# Patient Record
Sex: FEMALE | Race: WHITE | NOT HISPANIC OR LATINO | Employment: FULL TIME | URBAN - NONMETROPOLITAN AREA
[De-identification: names, ages, dates, MRNs, and addresses within clinical notes are randomized per-mention and may not be internally consistent; named-entity substitution may affect disease eponyms.]

---

## 2017-07-24 ENCOUNTER — OFFICE VISIT (OUTPATIENT)
Dept: SURGERY | Facility: CLINIC | Age: 42
End: 2017-07-24

## 2017-07-24 VITALS
BODY MASS INDEX: 31.66 KG/M2 | HEART RATE: 80 BPM | TEMPERATURE: 98.1 F | OXYGEN SATURATION: 98 % | WEIGHT: 197 LBS | HEIGHT: 66 IN | SYSTOLIC BLOOD PRESSURE: 120 MMHG | DIASTOLIC BLOOD PRESSURE: 80 MMHG

## 2017-07-24 DIAGNOSIS — R10.11 RIGHT UPPER QUADRANT ABDOMINAL PAIN: ICD-10-CM

## 2017-07-24 DIAGNOSIS — D50.0 IRON DEFICIENCY ANEMIA DUE TO CHRONIC BLOOD LOSS: ICD-10-CM

## 2017-07-24 DIAGNOSIS — K21.00 GASTROESOPHAGEAL REFLUX DISEASE WITH ESOPHAGITIS: Primary | ICD-10-CM

## 2017-07-24 PROCEDURE — 99204 OFFICE O/P NEW MOD 45 MIN: CPT | Performed by: SURGERY

## 2017-07-24 RX ORDER — DIAZEPAM 5 MG/1
5 TABLET ORAL DAILY PRN
COMMUNITY
End: 2018-05-09

## 2017-07-24 RX ORDER — ARIPIPRAZOLE 10 MG/1
10 TABLET ORAL DAILY
COMMUNITY
End: 2018-05-09

## 2017-07-24 NOTE — PROGRESS NOTES
Patient: Mary MENEZES    YOB: 1975    Date: 07/24/2017    Primary Care Provider: Sukumar Medley MD    Reason for Consultation: Colonoscopy    Chief complaint:   Chief Complaint   Patient presents with   • Anemia       Subjective .     History of present illness:  I saw the patient in the office today as a consultation for evaluation and treatment of colonoscopy and EGD. Patient c/o dizziness, weakness, and fatigue per 1 week. She was admitted to [facility] on 7/21/17 per anemia. Previous colonoscopy done 5 years ago per chronic constipation. Positive family hx of colon cancer. Patient's grandfather had colon cancer, patient had previous problems with bleeding hemorrhoid disease are currently sees no visible blood.  Hemoglobin was down to 5.3, she was given 2 units of blood last week.  Patient feels tired, has nausea abdominal bloating and recent weight gain with fatty food intolerance.  No previous gallbladder workup.  No previous upper endoscopy.  Her last colonoscopy indicated a few polyps.  Patient does not have regular menstrual periods, had a hysterectomy in the past.               Review of Systems   Constitutional: Positive for fatigue. Negative for chills, fever and unexpected weight change.   HENT: Negative for hearing loss, trouble swallowing and voice change.    Eyes: Negative for visual disturbance.   Respiratory: Positive for shortness of breath. Negative for apnea, cough, chest tightness and wheezing.    Cardiovascular: Negative for chest pain, palpitations and leg swelling.   Gastrointestinal: Positive for abdominal pain. Negative for abdominal distention, anal bleeding, blood in stool, constipation, diarrhea, nausea, rectal pain and vomiting.   Endocrine: Negative for cold intolerance and heat intolerance.   Genitourinary: Negative for difficulty urinating, dysuria and flank pain.   Musculoskeletal: Negative for back pain and gait problem.   Skin: Negative for color change, rash and  wound.   Neurological: Positive for dizziness, weakness and light-headedness. Negative for syncope, speech difficulty, numbness and headaches.   Hematological: Negative for adenopathy. Does not bruise/bleed easily.   Psychiatric/Behavioral: Negative for confusion. The patient is not nervous/anxious.            History:  Past Medical History:   Diagnosis Date   • Depression with anxiety        Past Surgical History:   Procedure Laterality Date   • TUBAL ABDOMINAL LIGATION         Family History   Problem Relation Age of Onset   • Diabetes Mother    • Diabetes Father    • Diabetes Maternal Grandmother    • Heart disease Maternal Grandmother    • Heart disease Paternal Grandmother    • Colon cancer Maternal Grandfather        Social History   Substance Use Topics   • Smoking status: Current Every Day Smoker   • Smokeless tobacco: None   • Alcohol use Yes       Allergies:  Allergies no known allergies    Medications:    Current Outpatient Prescriptions:   •  ARIPiprazole (ABILIFY) 10 MG tablet, Take 10 mg by mouth Daily., Disp: , Rfl:   •  diazePAM (VALIUM) 5 MG tablet, Take 5 mg by mouth As Needed for Anxiety., Disp: , Rfl:     Objective     Vital Signs:   Temp:  [98.1 °F (36.7 °C)] 98.1 °F (36.7 °C)  Heart Rate:  [80] 80  BP: (120)/(80) 120/80    Physical Exam:   General Appearance:    Alert, cooperative, in no acute distress   Head:    Normocephalic, without obvious abnormality, atraumatic   Eyes:            Lids and lashes normal, conjunctivae and sclerae normal, no   icterus, no pallor, corneas clear, PERRL   Ears:    Ears appear intact with no abnormalities noted   Throat:   No oral lesions, no thrush, oral mucosa moist   Neck:   No adenopathy, supple, trachea midline, no thyromegaly,  no JVD   Lungs:     Clear to auscultation,respirations regular, even and                  unlabored    Heart:    Regular rhythm and normal rate, normal S1 and S2, no            murmur   Abdomen:     no masses, no organomegaly, soft  non-tender, non-distended, no guarding, there is  evidence of Right upper quadrant tenderness   Extremities:   Moves all extremities well, no edema, no cyanosis, no             redness   Pulses:   Pulses palpable and equal bilaterally   Skin:   No bleeding, bruising or rash   Lymph nodes:   No palpable adenopathy   Neurologic:   Cranial nerves 2 - 12 grossly intact, sensation intact      Results Review:   I reviewed the patient's new clinical results.    Assessment/Plan :    1. Gastroesophageal reflux disease with esophagitis    2. Right upper quadrant abdominal pain    3. Iron deficiency anemia due to chronic blood loss        I recommend a colonoscopy And EGD for further evaluation. The procedure was explained as well as the risks which include but are not limited to bleeding, infection, perforation, abdominal pain etc. The patient understands these risks and the procedure and wishes to proceed.  We will also plan on gallbladder ultrasound today and schedule evaluation by hematology for chronic anemia.  Ultrasound today indicates a thickened gallbladder wall 1 cm and sludge in the gallbladder indicating chronic cholecystitis.  If endoscopy negative, patient will need laparoscopic cholecystectomy especially since she has a very strong family history of gallbladder cancer as well    Electronically signed by Albaro Lopez MD  07/24/17 2:20 PM

## 2017-07-26 ENCOUNTER — PREP FOR SURGERY (OUTPATIENT)
Dept: OTHER | Facility: HOSPITAL | Age: 42
End: 2017-07-26

## 2017-07-26 DIAGNOSIS — D50.0 IRON DEFICIENCY ANEMIA DUE TO CHRONIC BLOOD LOSS: Primary | ICD-10-CM

## 2017-07-28 ENCOUNTER — ANESTHESIA EVENT (OUTPATIENT)
Dept: GASTROENTEROLOGY | Facility: HOSPITAL | Age: 42
End: 2017-07-28

## 2017-07-28 ENCOUNTER — HOSPITAL ENCOUNTER (OUTPATIENT)
Facility: HOSPITAL | Age: 42
Setting detail: HOSPITAL OUTPATIENT SURGERY
Discharge: HOME | End: 2017-07-28
Attending: SURGERY | Admitting: SURGERY

## 2017-07-28 ENCOUNTER — ANESTHESIA (OUTPATIENT)
Dept: GASTROENTEROLOGY | Facility: HOSPITAL | Age: 42
End: 2017-07-28

## 2017-07-28 VITALS
DIASTOLIC BLOOD PRESSURE: 81 MMHG | HEART RATE: 65 BPM | OXYGEN SATURATION: 99 % | WEIGHT: 197 LBS | HEIGHT: 66 IN | SYSTOLIC BLOOD PRESSURE: 138 MMHG | RESPIRATION RATE: 16 BRPM | TEMPERATURE: 97.6 F | BODY MASS INDEX: 31.66 KG/M2

## 2017-07-28 DIAGNOSIS — D50.0 IRON DEFICIENCY ANEMIA DUE TO CHRONIC BLOOD LOSS: ICD-10-CM

## 2017-07-28 LAB — B-HCG UR QL: NEGATIVE

## 2017-07-28 PROCEDURE — 25010000002 PROPOFOL 10 MG/ML EMULSION: Performed by: NURSE ANESTHETIST, CERTIFIED REGISTERED

## 2017-07-28 PROCEDURE — 25010000002 FENTANYL CITRATE (PF) 100 MCG/2ML SOLUTION: Performed by: NURSE ANESTHETIST, CERTIFIED REGISTERED

## 2017-07-28 PROCEDURE — 25010000002 MIDAZOLAM PER 1 MG: Performed by: NURSE ANESTHETIST, CERTIFIED REGISTERED

## 2017-07-28 PROCEDURE — 81025 URINE PREGNANCY TEST: CPT | Performed by: SURGERY

## 2017-07-28 RX ORDER — MIDAZOLAM HYDROCHLORIDE 1 MG/ML
INJECTION INTRAMUSCULAR; INTRAVENOUS AS NEEDED
Status: DISCONTINUED | OUTPATIENT
Start: 2017-07-28 | End: 2017-07-28 | Stop reason: SURG

## 2017-07-28 RX ORDER — PROPOFOL 10 MG/ML
VIAL (ML) INTRAVENOUS AS NEEDED
Status: DISCONTINUED | OUTPATIENT
Start: 2017-07-28 | End: 2017-07-28 | Stop reason: SURG

## 2017-07-28 RX ORDER — MIDAZOLAM HYDROCHLORIDE 1 MG/ML
2 INJECTION INTRAMUSCULAR; INTRAVENOUS ONCE
Status: DISCONTINUED | OUTPATIENT
Start: 2017-07-28 | End: 2017-07-28

## 2017-07-28 RX ORDER — LIDOCAINE HYDROCHLORIDE 20 MG/ML
INJECTION, SOLUTION INFILTRATION; PERINEURAL AS NEEDED
Status: DISCONTINUED | OUTPATIENT
Start: 2017-07-28 | End: 2017-07-28 | Stop reason: SURG

## 2017-07-28 RX ORDER — SODIUM CHLORIDE 0.9 % (FLUSH) 0.9 %
3 SYRINGE (ML) INJECTION AS NEEDED
Status: DISCONTINUED | OUTPATIENT
Start: 2017-07-28 | End: 2017-07-28 | Stop reason: HOSPADM

## 2017-07-28 RX ORDER — ONDANSETRON 2 MG/ML
4 INJECTION INTRAMUSCULAR; INTRAVENOUS ONCE AS NEEDED
Status: DISCONTINUED | OUTPATIENT
Start: 2017-07-28 | End: 2017-07-28 | Stop reason: HOSPADM

## 2017-07-28 RX ORDER — FENTANYL CITRATE 50 UG/ML
INJECTION, SOLUTION INTRAMUSCULAR; INTRAVENOUS AS NEEDED
Status: DISCONTINUED | OUTPATIENT
Start: 2017-07-28 | End: 2017-07-28 | Stop reason: SURG

## 2017-07-28 RX ORDER — SODIUM CHLORIDE, SODIUM LACTATE, POTASSIUM CHLORIDE, CALCIUM CHLORIDE 600; 310; 30; 20 MG/100ML; MG/100ML; MG/100ML; MG/100ML
1000 INJECTION, SOLUTION INTRAVENOUS CONTINUOUS PRN
Status: DISCONTINUED | OUTPATIENT
Start: 2017-07-28 | End: 2017-07-28 | Stop reason: HOSPADM

## 2017-07-28 RX ADMIN — PROPOFOL 50 MG: 10 INJECTION, EMULSION INTRAVENOUS at 11:44

## 2017-07-28 RX ADMIN — PROPOFOL 50 MG: 10 INJECTION, EMULSION INTRAVENOUS at 11:35

## 2017-07-28 RX ADMIN — PROPOFOL 50 MG: 10 INJECTION, EMULSION INTRAVENOUS at 11:34

## 2017-07-28 RX ADMIN — MIDAZOLAM HYDROCHLORIDE 1 MG: 1 INJECTION, SOLUTION INTRAMUSCULAR; INTRAVENOUS at 11:31

## 2017-07-28 RX ADMIN — PROPOFOL 30 MG: 10 INJECTION, EMULSION INTRAVENOUS at 11:50

## 2017-07-28 RX ADMIN — SODIUM CHLORIDE, POTASSIUM CHLORIDE, SODIUM LACTATE AND CALCIUM CHLORIDE 1000 ML: 600; 310; 30; 20 INJECTION, SOLUTION INTRAVENOUS at 11:00

## 2017-07-28 RX ADMIN — PROPOFOL 40 MG: 10 INJECTION, EMULSION INTRAVENOUS at 11:46

## 2017-07-28 RX ADMIN — FENTANYL CITRATE 50 MCG: 50 INJECTION, SOLUTION INTRAMUSCULAR; INTRAVENOUS at 11:31

## 2017-07-28 RX ADMIN — LIDOCAINE HYDROCHLORIDE 80 MG: 20 INJECTION, SOLUTION INFILTRATION; PERINEURAL at 11:34

## 2017-07-28 RX ADMIN — PROPOFOL 50 MG: 10 INJECTION, EMULSION INTRAVENOUS at 11:40

## 2017-07-28 RX ADMIN — PROPOFOL 50 MG: 10 INJECTION, EMULSION INTRAVENOUS at 11:37

## 2017-07-28 NOTE — ANESTHESIA POSTPROCEDURE EVALUATION
Patient: Mary Celis    Procedure Summary     Date Anesthesia Start Anesthesia Stop Room / Location    07/28/17 1131  [facility] ENDOSCOPY 3 / [facility] ENDOSCOPY       Procedure Diagnosis Surgeon Provider    ESOPHAGOGASTRODUODENOSCOPY WITH BIOPSIES (N/A Esophagus); COLONOSCOPY (N/A ) Iron deficiency anemia due to chronic blood loss  (Iron deficiency anemia due to chronic blood loss [D50.0]) MD Fawad Tafoya CRNA          Anesthesia Type: MAC  Last vitals  BP        Temp        Pulse       Resp        SpO2          Post Anesthesia Care and Evaluation    Patient location during evaluation: PHASE II  Patient participation: complete - patient participated  Level of consciousness: awake and alert  Pain score: 0  Pain management: satisfactory to patient  Airway patency: patent  Anesthetic complications: No anesthetic complications  PONV Status: none  Cardiovascular status: acceptable and stable  Respiratory status: acceptable and nasal cannula  Hydration status: acceptable

## 2017-07-28 NOTE — ANESTHESIA PREPROCEDURE EVALUATION
Anesthesia Evaluation     Patient summary reviewed and Nursing notes reviewed   history of anesthetic complications: difficult airway  NPO Solid Status: > 8 hours  NPO Liquid Status: > 8 hours     Airway   Mallampati: I  TM distance: >3 FB  Neck ROM: full  no difficulty expected  Dental - normal exam     Pulmonary - normal exam   (+) a smoker (1/2ppd x 25 years. Abstained today) Current,   Cardiovascular - negative cardio ROS and normal exam    Rhythm: regular  Rate: normal        Neuro/Psych  (+) psychiatric history Anxiety and Depression,    GI/Hepatic/Renal/Endo    (+) obesity (BMI 31),      Musculoskeletal (-) negative ROS    Abdominal  - normal exam    Abdomen: soft.  Bowel sounds: normal.   Substance History - negative use     OB/GYN          Other - negative ROS                                       Anesthesia Plan    ASA 2     MAC   (Risks and benefits discussed including risk of aspiration, recall and dental damage. All patient questions answered. Will continue with POC.)  intravenous induction   Anesthetic plan and risks discussed with patient.

## 2017-08-02 LAB
LAB AP CASE REPORT: NORMAL
Lab: NORMAL
PATH REPORT.FINAL DX SPEC: NORMAL

## 2017-08-04 ENCOUNTER — OFFICE VISIT (OUTPATIENT)
Dept: SURGERY | Facility: CLINIC | Age: 42
End: 2017-08-04

## 2017-08-04 VITALS
HEIGHT: 66 IN | TEMPERATURE: 97.6 F | OXYGEN SATURATION: 97 % | WEIGHT: 197 LBS | BODY MASS INDEX: 31.66 KG/M2 | HEART RATE: 97 BPM | SYSTOLIC BLOOD PRESSURE: 146 MMHG | DIASTOLIC BLOOD PRESSURE: 80 MMHG

## 2017-08-04 DIAGNOSIS — D50.0 IRON DEFICIENCY ANEMIA DUE TO CHRONIC BLOOD LOSS: Primary | ICD-10-CM

## 2017-08-04 DIAGNOSIS — R10.11 RIGHT UPPER QUADRANT ABDOMINAL PAIN: ICD-10-CM

## 2017-08-04 PROCEDURE — 99213 OFFICE O/P EST LOW 20 MIN: CPT | Performed by: SURGERY

## 2017-08-04 RX ORDER — SODIUM CHLORIDE 9 MG/ML
100 INJECTION, SOLUTION INTRAVENOUS CONTINUOUS
Status: CANCELLED | OUTPATIENT
Start: 2017-08-04

## 2017-08-04 RX ORDER — PANTOPRAZOLE SODIUM 40 MG/10ML
40 INJECTION, POWDER, LYOPHILIZED, FOR SOLUTION INTRAVENOUS ONCE
Status: CANCELLED | OUTPATIENT
Start: 2017-08-04 | End: 2017-08-04

## 2017-08-04 NOTE — PROGRESS NOTES
Patient: Mary Celis    YOB: 1975    Date: 08/04/2017    Primary Care Provider: Provider Not In System    Reason for Consultation: Follow-up EGD and colonoscopy    Chief Complaint:   Chief Complaint   Patient presents with   • Follow-up     EGD and Colonoscopy       History of present illness:  I saw the patient in the office today as a followup from their recent EGD with biopsy, the pathology report did show mild chronic gastritis with mild reactive gastropathy.  They state that they have done well. Patient c/o constant aching in legs and feet.Patient does not have any visible rectal bleeding but continues to have anemia.  She is scheduled to see hematology in 2 weeks.  Patient has abnormal ultrasound with thickened gallbladder wall with sludge.  She has a strong family history of gallbladder cancer.    Review of Systems   Constitutional: Negative for chills, fatigue, fever and unexpected weight change.   HENT: Negative for hearing loss, trouble swallowing and voice change.    Eyes: Negative for visual disturbance.   Respiratory: Negative for apnea, cough, chest tightness, shortness of breath and wheezing.    Cardiovascular: Negative for chest pain, palpitations and leg swelling.   Gastrointestinal: Negative for abdominal distention, abdominal pain, anal bleeding, blood in stool, constipation, diarrhea, nausea, rectal pain and vomiting.        Acid reflux     Endocrine: Negative for cold intolerance and heat intolerance.   Genitourinary: Negative for difficulty urinating, dysuria and flank pain.   Musculoskeletal: Negative for back pain and gait problem.   Skin: Negative for color change, rash and wound.   Neurological: Negative for dizziness, syncope, speech difficulty, weakness, light-headedness, numbness and headaches.   Hematological: Negative for adenopathy. Does not bruise/bleed easily.   Psychiatric/Behavioral: Negative for confusion. The patient is not nervous/anxious.        Vital Signs:    Temp:  [97.6 °F (36.4 °C)] 97.6 °F (36.4 °C)  Heart Rate:  [97] 97  BP: (146)/(80) 146/80    Allergies:  No Known Allergies    Medications:    Current Outpatient Prescriptions:   •  ARIPiprazole (ABILIFY) 10 MG tablet, Take 10 mg by mouth Daily., Disp: , Rfl:   •  B Complex Vitamins (B-COMPLEX/B-12 PO), Take  by mouth., Disp: , Rfl:   •  Cholecalciferol ([employer] VIT D 5000 HIGH-POTENCY PO), Take  by mouth., Disp: , Rfl:   •  diazePAM (VALIUM) 5 MG tablet, Take 5 mg by mouth As Needed for Anxiety., Disp: , Rfl:     Physical Exam:   General Appearance:    Alert, cooperative, in no acute distress   Abdomen:     no masses, no organomegaly, soft non-tender, non-distended, no guarding, wounds are well healed, no evidence of recurrent hernia   Chest:      Clear toausculation            Cor:  Regular rate and rhythm      Assessment / Plan:    1. Iron deficiency anemia due to chronic blood loss    2. Right upper quadrant abdominal pain        I did discuss the situation with the patient today in the office and they have done well from their recent EGD with biopsy. I have told the patient She will need a laparoscopic cholecystectomy.  Risks of bleeding, infection and possible bile leak discussed and patient agreeable..    Electronically signed by Albaro Lopez MD  08/04/17        Scribed for Albaro Lopez MD by Doris Jameson. 8/4/2017  3:00 PM

## 2017-08-23 ENCOUNTER — HOSPITAL ENCOUNTER (OUTPATIENT)
Dept: GENERAL RADIOLOGY | Facility: HOSPITAL | Age: 42
Discharge: HOME | End: 2017-08-23
Admitting: SURGERY

## 2017-08-23 ENCOUNTER — TELEPHONE (OUTPATIENT)
Dept: ONCOLOGY | Facility: CLINIC | Age: 42
End: 2017-08-23

## 2017-08-23 ENCOUNTER — CONSULT (OUTPATIENT)
Dept: ONCOLOGY | Facility: CLINIC | Age: 42
End: 2017-08-23

## 2017-08-23 ENCOUNTER — APPOINTMENT (OUTPATIENT)
Dept: PREADMISSION TESTING | Facility: HOSPITAL | Age: 42
End: 2017-08-23

## 2017-08-23 VITALS
BODY MASS INDEX: 31.5 KG/M2 | WEIGHT: 196 LBS | TEMPERATURE: 97.5 F | HEART RATE: 85 BPM | DIASTOLIC BLOOD PRESSURE: 73 MMHG | SYSTOLIC BLOOD PRESSURE: 118 MMHG | HEIGHT: 66 IN | RESPIRATION RATE: 16 BRPM

## 2017-08-23 VITALS — WEIGHT: 196 LBS | BODY MASS INDEX: 31.5 KG/M2 | HEIGHT: 66 IN

## 2017-08-23 DIAGNOSIS — D50.0 IRON DEFICIENCY ANEMIA DUE TO CHRONIC BLOOD LOSS: ICD-10-CM

## 2017-08-23 DIAGNOSIS — D50.0 IRON DEFICIENCY ANEMIA DUE TO CHRONIC BLOOD LOSS: Primary | ICD-10-CM

## 2017-08-23 DIAGNOSIS — K90.9 IRON MALABSORPTION: ICD-10-CM

## 2017-08-23 LAB
ALBUMIN SERPL-MCNC: 4.7 G/DL (ref 3.5–5)
ALBUMIN/GLOB SERPL: 1.5 G/DL (ref 1–2)
ALP SERPL-CCNC: 67 U/L (ref 38–126)
ALT SERPL W P-5'-P-CCNC: 33 U/L (ref 13–69)
ANION GAP SERPL CALCULATED.3IONS-SCNC: 17.4 MMOL/L
ANISOCYTOSIS BLD QL: ABNORMAL
AST SERPL-CCNC: 21 U/L (ref 15–46)
B-HCG UR QL: NEGATIVE
BASOPHILS # BLD MANUAL: 0.1 10*3/MM3 (ref 0–0.2)
BASOPHILS NFR BLD AUTO: 1 % (ref 0–2.5)
BILIRUB SERPL-MCNC: 0.5 MG/DL (ref 0.2–1.3)
BILIRUB UR QL STRIP: NEGATIVE
BUN BLD-MCNC: 9 MG/DL (ref 7–20)
BUN/CREAT SERPL: 15 (ref 7.1–23.5)
CALCIUM SPEC-SCNC: 9.6 MG/DL (ref 8.4–10.2)
CHLORIDE SERPL-SCNC: 102 MMOL/L (ref 98–107)
CLARITY UR: ABNORMAL
CO2 SERPL-SCNC: 27 MMOL/L (ref 26–30)
COLOR UR: YELLOW
CREAT BLD-MCNC: 0.6 MG/DL (ref 0.6–1.3)
DEPRECATED RDW RBC AUTO: 81 FL (ref 37–54)
ELLIPTOCYTES BLD QL SMEAR: ABNORMAL
EOSINOPHIL # BLD MANUAL: 0.2 10*3/MM3 (ref 0–0.7)
EOSINOPHIL NFR BLD MANUAL: 2 % (ref 0–7)
ERYTHROCYTE [DISTWIDTH] IN BLOOD BY AUTOMATED COUNT: 27.7 % (ref 11.5–14.5)
FERRITIN SERPL-MCNC: 15.4 NG/ML (ref 6.24–137)
FOLATE SERPL-MCNC: >20 NG/ML
GFR SERPL CREATININE-BSD FRML MDRD: 110 ML/MIN/1.73
GLOBULIN UR ELPH-MCNC: 3.2 GM/DL
GLUCOSE BLD-MCNC: 94 MG/DL (ref 74–98)
GLUCOSE UR STRIP-MCNC: NEGATIVE MG/DL
HCT VFR BLD AUTO: 35.3 % (ref 37–47)
HGB BLD-MCNC: 10.1 G/DL (ref 12–16)
HGB UR QL STRIP.AUTO: NEGATIVE
HYPOCHROMIA BLD QL: ABNORMAL
IRON 24H UR-MRATE: 41 MCG/DL (ref 37–181)
IRON SATN MFR SERPL: 9 % (ref 11–46)
KETONES UR QL STRIP: NEGATIVE
LEUKOCYTE ESTERASE UR QL STRIP.AUTO: NEGATIVE
LYMPHOCYTES # BLD MANUAL: 2.08 10*3/MM3 (ref 0.6–3.4)
LYMPHOCYTES NFR BLD MANUAL: 21 % (ref 10–50)
LYMPHOCYTES NFR BLD MANUAL: 5 % (ref 0–12)
MCH RBC QN AUTO: 23.1 PG (ref 27–31)
MCHC RBC AUTO-ENTMCNC: 28.6 G/DL (ref 30–37)
MCV RBC AUTO: 80.6 FL (ref 81–99)
METAMYELOCYTES NFR BLD MANUAL: 1 % (ref 0–0)
MONOCYTES # BLD AUTO: 0.5 10*3/MM3 (ref 0–0.9)
NEUTROPHILS # BLD AUTO: 6.93 10*3/MM3 (ref 2–6.9)
NEUTROPHILS NFR BLD MANUAL: 69 % (ref 37–80)
NEUTS BAND NFR BLD MANUAL: 1 % (ref 0–6)
NITRITE UR QL STRIP: NEGATIVE
PH UR STRIP.AUTO: <=5 [PH] (ref 5–8)
PLATELET # BLD AUTO: 476 10*3/MM3 (ref 130–400)
PMV BLD AUTO: 9.8 FL (ref 6–12)
POIKILOCYTOSIS BLD QL SMEAR: ABNORMAL
POLYCHROMASIA BLD QL SMEAR: ABNORMAL
POTASSIUM BLD-SCNC: 4.4 MMOL/L (ref 3.5–5.1)
PROT SERPL-MCNC: 7.9 G/DL (ref 6.3–8.2)
PROT UR QL STRIP: NEGATIVE
RBC # BLD AUTO: 4.38 10*6/MM3 (ref 4.2–5.4)
SCAN SLIDE: NORMAL
SMALL PLATELETS BLD QL SMEAR: ABNORMAL
SODIUM BLD-SCNC: 142 MMOL/L (ref 137–145)
SP GR UR STRIP: >=1.03 (ref 1–1.03)
TIBC SERPL-MCNC: 479 MCG/DL (ref 261–497)
UROBILINOGEN UR QL STRIP: ABNORMAL
VIT B12 BLD-MCNC: 671 PG/ML (ref 239–931)
WBC MORPH BLD: NORMAL
WBC NRBC COR # BLD: 9.9 10*3/MM3 (ref 4.8–10.8)

## 2017-08-23 PROCEDURE — 81025 URINE PREGNANCY TEST: CPT | Performed by: SURGERY

## 2017-08-23 PROCEDURE — 82746 ASSAY OF FOLIC ACID SERUM: CPT | Performed by: NURSE PRACTITIONER

## 2017-08-23 PROCEDURE — 83540 ASSAY OF IRON: CPT | Performed by: NURSE PRACTITIONER

## 2017-08-23 PROCEDURE — 85007 BL SMEAR W/DIFF WBC COUNT: CPT | Performed by: NURSE PRACTITIONER

## 2017-08-23 PROCEDURE — 80053 COMPREHEN METABOLIC PANEL: CPT | Performed by: NURSE PRACTITIONER

## 2017-08-23 PROCEDURE — 36415 COLL VENOUS BLD VENIPUNCTURE: CPT | Performed by: INTERNAL MEDICINE

## 2017-08-23 PROCEDURE — 83550 IRON BINDING TEST: CPT | Performed by: NURSE PRACTITIONER

## 2017-08-23 PROCEDURE — 99204 OFFICE O/P NEW MOD 45 MIN: CPT | Performed by: INTERNAL MEDICINE

## 2017-08-23 PROCEDURE — 82607 VITAMIN B-12: CPT | Performed by: NURSE PRACTITIONER

## 2017-08-23 PROCEDURE — 71010 HC CHEST PA OR AP: CPT

## 2017-08-23 PROCEDURE — 81003 URINALYSIS AUTO W/O SCOPE: CPT | Performed by: SURGERY

## 2017-08-23 PROCEDURE — 82728 ASSAY OF FERRITIN: CPT | Performed by: NURSE PRACTITIONER

## 2017-08-23 PROCEDURE — 85025 COMPLETE CBC W/AUTO DIFF WBC: CPT | Performed by: NURSE PRACTITIONER

## 2017-08-23 RX ORDER — DIPHENHYDRAMINE HYDROCHLORIDE 50 MG/ML
25 INJECTION INTRAMUSCULAR; INTRAVENOUS ONCE
Status: CANCELLED | OUTPATIENT
Start: 2017-08-25

## 2017-08-23 RX ORDER — SODIUM CHLORIDE 9 MG/ML
250 INJECTION, SOLUTION INTRAVENOUS ONCE
Status: CANCELLED | OUTPATIENT
Start: 2017-09-01

## 2017-08-23 RX ORDER — SODIUM CHLORIDE 9 MG/ML
250 INJECTION, SOLUTION INTRAVENOUS ONCE
Status: CANCELLED | OUTPATIENT
Start: 2017-08-25

## 2017-08-23 RX ORDER — FAMOTIDINE 10 MG/ML
20 INJECTION, SOLUTION INTRAVENOUS ONCE
Status: CANCELLED | OUTPATIENT
Start: 2017-08-25

## 2017-08-23 NOTE — PROGRESS NOTES
DATE OF CONSULTATION: 8/23/2017    REFERRING PHYSICIAN: Dr. Lopez  Dear Dr. Albaro Lopez  Thank you for asking for my medical advice on this patient. I saw her in the  [place] office on 8/23/2017    REASON FOR CONSULTATION: Anemia     HISTORY OF PRESENT ILLNESS: The patient is a very pleasant 42 y.o.  female  who was in her usual state of health until approximately a year ago . She was admitted to [facility] for a blood transfusion. The patient presented again to the ED on 07/21/2017. She received 2 units of blood for a HGB of 5.6 and HCT 21.3.  She presented with 2 days of dizziness with movement. She also had complaints of SOA with activity, fatigue, and tiredness.  She had guaiac postive stool.     SUBJECTIVE: When I saw the patient today she had complaints of dizziness, fatigue, and SOA.  She is spending most of the day in the bed because she is unable to do her normal activities.  She noted bright red blood and a hemorrhoid a few days ago.  She is currently on oral iron that is causing more constipation, nausea, and stomach pain. She reports a recent weight gain of about 20lbs due to her energy level.     Review of Systems   Constitutional: Negative for activity change, appetite change, chills, fatigue, fever and unexpected weight change.   HENT: Negative for hearing loss, mouth sores, nosebleeds, sore throat and trouble swallowing.    Eyes: Negative for visual disturbance.   Respiratory: Negative for cough, chest tightness, shortness of breath and wheezing.    Cardiovascular: Negative for chest pain, palpitations and leg swelling.   Gastrointestinal: Negative for abdominal distention, abdominal pain, blood in stool, constipation, diarrhea, nausea, rectal pain and vomiting.   Endocrine: Negative for cold intolerance and heat intolerance.   Genitourinary: Negative for difficulty urinating, dysuria, frequency and urgency.   Musculoskeletal: Negative for arthralgias, back pain, gait problem, joint swelling and  myalgias.   Skin: Negative for rash.   Neurological: Negative for dizziness, tremors, syncope, weakness, light-headedness, numbness and headaches.   Hematological: Negative for adenopathy. Does not bruise/bleed easily.   Psychiatric/Behavioral: Negative for confusion, sleep disturbance and suicidal ideas. The patient is not nervous/anxious.        Past Medical History:   Diagnosis Date   • Depression with anxiety    • Spinal headache        Social History     Social History   • Marital status:      Spouse name: N/A   • Number of children: N/A   • Years of education: N/A     Occupational History   • Not on file.     Social History Main Topics   • Smoking status: Current Every Day Smoker     Packs/day: 0.50     Years: 25.00   • Smokeless tobacco: Never Used      Comment: WOULD LIKE TO WHEN I GET HEALTHY   • Alcohol use Yes      Comment: VERY SELDOM   • Drug use: No   • Sexual activity: Defer     Other Topics Concern   • Not on file     Social History Narrative       Family History   Problem Relation Age of Onset   • Diabetes Mother    • Diabetes Father    • Diabetes Maternal Grandmother    • Heart disease Maternal Grandmother    • Heart disease Paternal Grandmother    • Colon cancer Maternal Grandfather      Smoker  Lives with  and children      Past Surgical History:   Procedure Laterality Date   • COLONOSCOPY N/A 7/28/2017    Procedure: COLONOSCOPY;  Surgeon: Albaro Lopez MD;  Location: [facility] ENDOSCOPY;  Service:    • D&C HYSTEROSCOPY      VITAL SIGNS UNSTABLE WITH THIS PROCEDURE   • ENDOSCOPY N/A 7/28/2017    Procedure: ESOPHAGOGASTRODUODENOSCOPY WITH BIOPSIES;  Surgeon: Albaro Lopez MD;  Location: [facility] ENDOSCOPY;  Service:    • TUBAL ABDOMINAL LIGATION         No Known Allergies       Current Outpatient Prescriptions:   •  ARIPiprazole (ABILIFY) 10 MG tablet, Take 10 mg by mouth Daily., Disp: , Rfl:   •  B Complex Vitamins (B-COMPLEX/B-12 PO), Take  by mouth., Disp: , Rfl:   •   Cholecalciferol ([employer] VIT D 5000 HIGH-POTENCY PO), Take  by mouth., Disp: , Rfl:   •  diazePAM (VALIUM) 5 MG tablet, Take 5 mg by mouth As Needed for Anxiety., Disp: , Rfl:     PHYSICAL EXAMINATION:   LMP 07/24/2017 (Approximate)   ECOG Performance Status: 1 - Symptomatic but completely ambulatory  General Appearance:  alert, cooperative, no apparent distress and appears stated age   Neurologic/Psychiatric: A&O x 3, gait steady, appropriate affect, strength 5/5 in all muscle groups   HEENT:  Normocephalic, without obvious abnormality, mucous membranes moist   Neck: Supple, symmetrical, trachea midline, no adenopathy;  No thyromegaly, masses, or tenderness   Lungs:   Clear to auscultation bilaterally; respirations regular, even, and unlabored bilaterally   Heart:  Regular rate and rhythm, no murmurs appreciated   Abdomen:   Soft, non-tender, non-distended and no organomegaly   Lymph nodes: No cervical, supraclavicular, inguinal or axillary adenopathy noted   Extremities: Normal, atraumatic; no clubbing, cyanosis, or edema    Skin: No rashes, ulcers, or suspicious lesions noted       No visits with results within 2 Week(s) from this visit.  Latest known visit with results is:    Admission on 07/28/2017, Discharged on 07/28/2017   Component Date Value Ref Range Status   • HCG, Urine QL 07/28/2017 Negative  Negative Final   • Case Report 07/28/2017    Final                    Value:Surgical Pathology Report                         Case: [id number]                                  Authorizing Provider:  Albaro Lopez MD         Collected:           07/28/2017 11:37 AM          Ordering Location:     [facility]    Received:            07/28/2017 12:45 PM                                 [facility]                                                                    Pathologist:           Paolo ANTON MD                                                           Specimen:    Gastric, Antrum, BIOPSY                                                                    • Final Diagnosis 07/28/2017    Final                    Value:This result contains rich text formatting which cannot be displayed here.        Us Gallbladder    Result Date: 7/24/2017  Narrative: RIGHT UPPER QUADRANT ULTRASOUND The patient did have a right upper quadrant ultrasound performed today in the normal manner.  There was good visualization obtained of the liver, there was noted to be no evidence of abnormalities.  The gallbladder was well visualized, there was evidence of gallbladder wall thickening, the wall measurement was 10 mm in size.  There was no evidence of cholelithiasis noted.  The common bile duct was noted to be 2 mm in size.  There was no evidence of pericholecystic fluid.     Impression: Significant gallbladder wall thickening and contracted gallbladder with sludge in the lumen.  Consistent with chronic cholecystitis        DIAGNOSTIC DATA:   1. Dr. Lopez's note from 08/04/2017 and [facility] admission has been reviewed by me and documented in the chart.   2. Laboratory data: WBC: 7.5, HGB: 8.3, HCT: 29.5, PLT: 334  Iron: 20  3. Pathology: EGD with a biopsy done July 28, 2017 revealed gastritis  4.  Radiology: RUQ Ultrasound July 24, 2017 showed gallstones.    ASSESSMENT: The patient is a very pleasant 42 y.o.  female  with iron deficiency anemia    PROBLEM LIST:   1. Iron deficiency anemia.   2.  Iron malabsorption   3.  EGD and colonoscopy done by Dr. Lopez on July 28, 2017 Chronic constipation  4.  Chronic Cholecystitis  5.  Bi-polar disorder    PLAN:   1. I had a long discussion today with the patient and her aunt about her new diagnosis of Iron deficiency anemia.   I reviewed the patient's documents including refereing provider's notes, imaging, and lab results.  I explained to the patient that her anemia is driven by iron deficiency.  The most likely underlying cause is chronic GI bleed.  Patient had thorough GI evaluation with  EGD and colonoscopy that revealed gastritis as well as internal hemorrhoids.  2. We will check labs today including a complete iron work up, ferritin, CBC, retic count, and Iron panel.    3. If her HGB level is below 7 today we will transfuse.  4. We will stop oral Iron due to intolerance and start feraheme day 1 and 8 to start next week. We discussed potential side effects including nausea, constipation, peripheral edema, diarrhea, and dizziness and potential infusion reaction including anaphylactic shock.   5. She will follow up with Dr. Lopez for scheduled choleycystectomy on 08/28/2017.  She will also follow up for management of constipation.   6. She will follow up in 3 months with CBC, ferritin, and iron panel.       SELVIN Ny   8/23/2017    Scribed for Leonor Springer MD by Marlen MONTALVO. 8/23/2017  10:24 AM  ILeonor MD, personally performed the services described in this documentation as scribed by the above named individual in my presence, and it is both accurate and complete.  8/23/2017  11:10 AM

## 2017-08-24 ENCOUNTER — HOSPITAL ENCOUNTER (OUTPATIENT)
Dept: INFUSION THERAPY | Facility: HOSPITAL | Age: 42
Setting detail: INFUSION SERIES
Discharge: HOME | End: 2017-08-24

## 2017-08-24 VITALS
DIASTOLIC BLOOD PRESSURE: 73 MMHG | SYSTOLIC BLOOD PRESSURE: 106 MMHG | TEMPERATURE: 97.5 F | RESPIRATION RATE: 20 BRPM | HEART RATE: 70 BPM | OXYGEN SATURATION: 100 %

## 2017-08-24 DIAGNOSIS — K90.9 IRON MALABSORPTION: ICD-10-CM

## 2017-08-24 DIAGNOSIS — D50.0 IRON DEFICIENCY ANEMIA DUE TO CHRONIC BLOOD LOSS: ICD-10-CM

## 2017-08-24 PROCEDURE — 25010000002 DIPHENHYDRAMINE PER 50 MG: Performed by: INTERNAL MEDICINE

## 2017-08-24 PROCEDURE — 25010000002 FERUMOXYTOL 510 MG/17ML SOLUTION 510 MG VIAL: Performed by: NURSE PRACTITIONER

## 2017-08-24 PROCEDURE — 96374 THER/PROPH/DIAG INJ IV PUSH: CPT

## 2017-08-24 PROCEDURE — 96375 TX/PRO/DX INJ NEW DRUG ADDON: CPT

## 2017-08-24 RX ORDER — FAMOTIDINE 10 MG/ML
20 INJECTION, SOLUTION INTRAVENOUS ONCE
Status: COMPLETED | OUTPATIENT
Start: 2017-08-24 | End: 2017-08-24

## 2017-08-24 RX ORDER — DIPHENHYDRAMINE HYDROCHLORIDE 50 MG/ML
25 INJECTION INTRAMUSCULAR; INTRAVENOUS ONCE
Status: COMPLETED | OUTPATIENT
Start: 2017-08-24 | End: 2017-08-24

## 2017-08-24 RX ORDER — SODIUM CHLORIDE 9 MG/ML
250 INJECTION, SOLUTION INTRAVENOUS ONCE
Status: COMPLETED | OUTPATIENT
Start: 2017-08-24 | End: 2017-08-24

## 2017-08-24 RX ADMIN — SODIUM CHLORIDE 250 ML: 900 INJECTION, SOLUTION INTRAVENOUS at 14:44

## 2017-08-24 RX ADMIN — DIPHENHYDRAMINE HYDROCHLORIDE 25 MG: 50 INJECTION, SOLUTION INTRAMUSCULAR; INTRAVENOUS at 14:23

## 2017-08-24 RX ADMIN — FERUMOXYTOL 510 MG: 510 INJECTION INTRAVENOUS at 14:44

## 2017-08-24 RX ADMIN — FAMOTIDINE 20 MG: 10 INJECTION, SOLUTION INTRAVENOUS at 14:22

## 2017-08-28 ENCOUNTER — ANESTHESIA (OUTPATIENT)
Dept: PERIOP | Facility: HOSPITAL | Age: 42
End: 2017-08-28

## 2017-08-28 ENCOUNTER — HOSPITAL ENCOUNTER (OUTPATIENT)
Facility: HOSPITAL | Age: 42
Setting detail: HOSPITAL OUTPATIENT SURGERY
Discharge: HOME | End: 2017-08-28
Attending: SURGERY | Admitting: SURGERY

## 2017-08-28 ENCOUNTER — APPOINTMENT (OUTPATIENT)
Dept: GENERAL RADIOLOGY | Facility: HOSPITAL | Age: 42
End: 2017-08-28

## 2017-08-28 ENCOUNTER — ANESTHESIA EVENT (OUTPATIENT)
Dept: PERIOP | Facility: HOSPITAL | Age: 42
End: 2017-08-28

## 2017-08-28 VITALS
TEMPERATURE: 97 F | OXYGEN SATURATION: 96 % | HEART RATE: 57 BPM | DIASTOLIC BLOOD PRESSURE: 57 MMHG | SYSTOLIC BLOOD PRESSURE: 103 MMHG | RESPIRATION RATE: 18 BRPM

## 2017-08-28 DIAGNOSIS — D50.0 IRON DEFICIENCY ANEMIA DUE TO CHRONIC BLOOD LOSS: ICD-10-CM

## 2017-08-28 DIAGNOSIS — R10.11 RIGHT UPPER QUADRANT ABDOMINAL PAIN: ICD-10-CM

## 2017-08-28 PROCEDURE — 0 IOVERSOL 64 % SOLUTION: Performed by: SURGERY

## 2017-08-28 PROCEDURE — 25010000002 HYDROMORPHONE PER 4 MG

## 2017-08-28 PROCEDURE — 25010000003 AMPICILLIN-SULBACTAM PER 1.5 G: Performed by: SURGERY

## 2017-08-28 PROCEDURE — 25010000002 FENTANYL CITRATE (PF) 100 MCG/2ML SOLUTION: Performed by: NURSE ANESTHETIST, CERTIFIED REGISTERED

## 2017-08-28 PROCEDURE — 25010000002 ONDANSETRON PER 1 MG: Performed by: NURSE ANESTHETIST, CERTIFIED REGISTERED

## 2017-08-28 PROCEDURE — 94799 UNLISTED PULMONARY SVC/PX: CPT

## 2017-08-28 PROCEDURE — 25010000002 MIDAZOLAM PER 1 MG

## 2017-08-28 PROCEDURE — 74300 X-RAY BILE DUCTS/PANCREAS: CPT

## 2017-08-28 PROCEDURE — 25010000002 DEXAMETHASONE PER 1 MG: Performed by: NURSE ANESTHETIST, CERTIFIED REGISTERED

## 2017-08-28 PROCEDURE — 25010000002 HYDROMORPHONE PER 4 MG: Performed by: NURSE ANESTHETIST, CERTIFIED REGISTERED

## 2017-08-28 PROCEDURE — 25010000002 PROPOFOL 200 MG/20ML EMULSION: Performed by: NURSE ANESTHETIST, CERTIFIED REGISTERED

## 2017-08-28 PROCEDURE — 47563 LAPARO CHOLECYSTECTOMY/GRAPH: CPT | Performed by: SURGERY

## 2017-08-28 RX ORDER — FENTANYL CITRATE 50 UG/ML
INJECTION, SOLUTION INTRAMUSCULAR; INTRAVENOUS AS NEEDED
Status: DISCONTINUED | OUTPATIENT
Start: 2017-08-28 | End: 2017-08-28 | Stop reason: SURG

## 2017-08-28 RX ORDER — GLYCOPYRROLATE 0.2 MG/ML
INJECTION INTRAMUSCULAR; INTRAVENOUS AS NEEDED
Status: DISCONTINUED | OUTPATIENT
Start: 2017-08-28 | End: 2017-08-28 | Stop reason: SURG

## 2017-08-28 RX ORDER — SODIUM CHLORIDE 9 MG/ML
100 INJECTION, SOLUTION INTRAVENOUS CONTINUOUS
Status: DISCONTINUED | OUTPATIENT
Start: 2017-08-28 | End: 2017-08-28 | Stop reason: HOSPADM

## 2017-08-28 RX ORDER — ONDANSETRON 2 MG/ML
INJECTION INTRAMUSCULAR; INTRAVENOUS AS NEEDED
Status: DISCONTINUED | OUTPATIENT
Start: 2017-08-28 | End: 2017-08-28 | Stop reason: SURG

## 2017-08-28 RX ORDER — MEPERIDINE HYDROCHLORIDE 50 MG/ML
50 INJECTION INTRAMUSCULAR; INTRAVENOUS; SUBCUTANEOUS ONCE
Status: DISCONTINUED | OUTPATIENT
Start: 2017-08-28 | End: 2017-08-28 | Stop reason: HOSPADM

## 2017-08-28 RX ORDER — HYDROCODONE BITARTRATE AND ACETAMINOPHEN 5; 325 MG/1; MG/1
1-2 TABLET ORAL EVERY 4 HOURS PRN
Qty: 24 TABLET | Refills: 0 | Status: SHIPPED | OUTPATIENT
Start: 2017-08-28 | End: 2017-09-11

## 2017-08-28 RX ORDER — MIDAZOLAM HYDROCHLORIDE 1 MG/ML
2 INJECTION INTRAMUSCULAR; INTRAVENOUS ONCE
Status: COMPLETED | OUTPATIENT
Start: 2017-08-28 | End: 2017-08-28

## 2017-08-28 RX ORDER — BUPIVACAINE HYDROCHLORIDE 5 MG/ML
INJECTION, SOLUTION EPIDURAL; INTRACAUDAL AS NEEDED
Status: DISCONTINUED | OUTPATIENT
Start: 2017-08-28 | End: 2017-08-28 | Stop reason: HOSPADM

## 2017-08-28 RX ORDER — ROCURONIUM BROMIDE 10 MG/ML
INJECTION, SOLUTION INTRAVENOUS AS NEEDED
Status: DISCONTINUED | OUTPATIENT
Start: 2017-08-28 | End: 2017-08-28 | Stop reason: SURG

## 2017-08-28 RX ORDER — PANTOPRAZOLE SODIUM 40 MG/10ML
INJECTION, POWDER, LYOPHILIZED, FOR SOLUTION INTRAVENOUS
Status: COMPLETED
Start: 2017-08-28 | End: 2017-08-28

## 2017-08-28 RX ORDER — MAGNESIUM HYDROXIDE 1200 MG/15ML
LIQUID ORAL AS NEEDED
Status: DISCONTINUED | OUTPATIENT
Start: 2017-08-28 | End: 2017-08-28 | Stop reason: HOSPADM

## 2017-08-28 RX ORDER — NEOSTIGMINE METHYLSULFATE 5 MG/5 ML
SYRINGE (ML) INTRAVENOUS AS NEEDED
Status: DISCONTINUED | OUTPATIENT
Start: 2017-08-28 | End: 2017-08-28 | Stop reason: SURG

## 2017-08-28 RX ORDER — MORPHINE SULFATE 2 MG/ML
2 INJECTION, SOLUTION INTRAMUSCULAR; INTRAVENOUS
Status: DISCONTINUED | OUTPATIENT
Start: 2017-08-28 | End: 2017-08-28 | Stop reason: HOSPADM

## 2017-08-28 RX ORDER — ONDANSETRON 2 MG/ML
4 INJECTION INTRAMUSCULAR; INTRAVENOUS ONCE
Status: DISCONTINUED | OUTPATIENT
Start: 2017-08-28 | End: 2017-08-28 | Stop reason: HOSPADM

## 2017-08-28 RX ORDER — PROMETHAZINE HYDROCHLORIDE 25 MG/ML
12.5 INJECTION, SOLUTION INTRAMUSCULAR; INTRAVENOUS ONCE AS NEEDED
Status: DISCONTINUED | OUTPATIENT
Start: 2017-08-28 | End: 2017-08-28 | Stop reason: HOSPADM

## 2017-08-28 RX ORDER — PROPOFOL 10 MG/ML
INJECTION, EMULSION INTRAVENOUS AS NEEDED
Status: DISCONTINUED | OUTPATIENT
Start: 2017-08-28 | End: 2017-08-28 | Stop reason: SURG

## 2017-08-28 RX ORDER — DEXAMETHASONE SODIUM PHOSPHATE 4 MG/ML
INJECTION, SOLUTION INTRA-ARTICULAR; INTRALESIONAL; INTRAMUSCULAR; INTRAVENOUS; SOFT TISSUE AS NEEDED
Status: DISCONTINUED | OUTPATIENT
Start: 2017-08-28 | End: 2017-08-28 | Stop reason: SURG

## 2017-08-28 RX ORDER — ONDANSETRON 2 MG/ML
4 INJECTION INTRAMUSCULAR; INTRAVENOUS ONCE AS NEEDED
Status: DISCONTINUED | OUTPATIENT
Start: 2017-08-28 | End: 2017-08-28 | Stop reason: HOSPADM

## 2017-08-28 RX ORDER — MIDAZOLAM HYDROCHLORIDE 1 MG/ML
INJECTION INTRAMUSCULAR; INTRAVENOUS
Status: COMPLETED
Start: 2017-08-28 | End: 2017-08-28

## 2017-08-28 RX ORDER — PANTOPRAZOLE SODIUM 40 MG/10ML
40 INJECTION, POWDER, LYOPHILIZED, FOR SOLUTION INTRAVENOUS ONCE
Status: COMPLETED | OUTPATIENT
Start: 2017-08-28 | End: 2017-08-28

## 2017-08-28 RX ADMIN — SODIUM CHLORIDE: 9 INJECTION, SOLUTION INTRAVENOUS at 10:30

## 2017-08-28 RX ADMIN — FENTANYL CITRATE 100 MCG: 50 INJECTION, SOLUTION INTRAMUSCULAR; INTRAVENOUS at 09:57

## 2017-08-28 RX ADMIN — SODIUM CHLORIDE 100 ML/HR: 9 INJECTION, SOLUTION INTRAVENOUS at 08:07

## 2017-08-28 RX ADMIN — MIDAZOLAM HYDROCHLORIDE 2 MG: 1 INJECTION, SOLUTION INTRAMUSCULAR; INTRAVENOUS at 08:43

## 2017-08-28 RX ADMIN — HYDROMORPHONE HYDROCHLORIDE 0.5 MG: 1 INJECTION, SOLUTION INTRAMUSCULAR; INTRAVENOUS; SUBCUTANEOUS at 10:50

## 2017-08-28 RX ADMIN — ONDANSETRON 4 MG: 2 INJECTION INTRAMUSCULAR; INTRAVENOUS at 10:00

## 2017-08-28 RX ADMIN — PROPOFOL 200 MG: 10 INJECTION, EMULSION INTRAVENOUS at 10:00

## 2017-08-28 RX ADMIN — DEXAMETHASONE SODIUM PHOSPHATE 4 MG: 4 INJECTION, SOLUTION INTRAMUSCULAR; INTRAVENOUS at 10:00

## 2017-08-28 RX ADMIN — MIDAZOLAM HYDROCHLORIDE 2 MG: 1 INJECTION INTRAMUSCULAR; INTRAVENOUS at 08:43

## 2017-08-28 RX ADMIN — PANTOPRAZOLE SODIUM 40 MG: 40 INJECTION, POWDER, LYOPHILIZED, FOR SOLUTION INTRAVENOUS at 08:09

## 2017-08-28 RX ADMIN — SODIUM CHLORIDE 3 G: 9 INJECTION, SOLUTION INTRAVENOUS at 09:56

## 2017-08-28 RX ADMIN — ROCURONIUM BROMIDE 30 MG: 10 INJECTION INTRAVENOUS at 10:00

## 2017-08-28 RX ADMIN — LIDOCAINE HYDROCHLORIDE 60 MG: 20 INJECTION, SOLUTION INTRAVENOUS at 10:00

## 2017-08-28 RX ADMIN — HYDROMORPHONE HYDROCHLORIDE 0.5 MG: 1 INJECTION, SOLUTION INTRAMUSCULAR; INTRAVENOUS; SUBCUTANEOUS at 11:03

## 2017-08-28 RX ADMIN — GLYCOPYRROLATE 0.6 MG: 0.2 INJECTION, SOLUTION INTRAMUSCULAR; INTRAVENOUS at 10:29

## 2017-08-28 RX ADMIN — PANTOPRAZOLE SODIUM 40 MG: 40 INJECTION, POWDER, FOR SOLUTION INTRAVENOUS at 08:09

## 2017-08-28 RX ADMIN — Medication 5 MG: at 10:29

## 2017-08-28 NOTE — ANESTHESIA POSTPROCEDURE EVALUATION
Patient: Mary Celis    Procedure Summary     Date Anesthesia Start Anesthesia Stop Room / Location    08/28/17 0956 1044 [facility] OR 4 / [facility] OR       Procedure Diagnosis Surgeon Provider    CHOLECYSTECTOMY LAPAROSCOPIC INTRAOPERATIVE CHOLANGIOGRAM (N/A Abdomen) Iron deficiency anemia due to chronic blood loss; Right upper quadrant abdominal pain  (Iron deficiency anemia due to chronic blood loss [D50.0]; Right upper quadrant abdominal pain [R10.11]) MD Fredy Tafoya CRNA          Anesthesia Type: general  Last vitals  BP     103/53   Temp 97       Pulse    57   Resp     12   SpO2     96     Post Anesthesia Care and Evaluation    Patient location during evaluation: PACU  Patient participation: complete - patient participated  Level of consciousness: awake  Pain score: 3  Pain management: adequate  Airway patency: patent  Anesthetic complications: No anesthetic complications  PONV Status: controlled  Cardiovascular status: acceptable and stable  Respiratory status: acceptable and face mask  Hydration status: acceptable

## 2017-08-28 NOTE — ANESTHESIA PROCEDURE NOTES
Airway  Urgency: elective    Airway not difficult    General Information and Staff    Patient location during procedure: OR  CRNA: BRITANY SIERRA    Indications and Patient Condition  Indications for airway management: airway protection    Preoxygenated: yes  Mask difficulty assessment: 1 - vent by mask    Final Airway Details  Final airway type: endotracheal airway      Successful airway: ETT  Cuffed: yes   Successful intubation technique: direct laryngoscopy  Endotracheal tube insertion site: oral  Blade: Herman  Blade size: #3  ETT size: 7.0 mm  Cormack-Lehane Classification: grade I - full view of glottis  Placement verified by: chest auscultation and capnometry   Measured from: lips  ETT to lips (cm): 21  Number of attempts at approach: 1

## 2017-08-28 NOTE — ANESTHESIA PREPROCEDURE EVALUATION
Anesthesia Evaluation     Patient summary reviewed and Nursing notes reviewed   history of anesthetic complications ( hx of slow to awaken 10 yrs ago no problems since): prolonged sedation  NPO Solid Status: > 8 hours  NPO Liquid Status: > 8 hours     Airway   Mallampati: I  TM distance: >3 FB  Neck ROM: full  no difficulty expected  Dental - normal exam     Pulmonary - normal exam   (+) a smoker (1-2x 25 years. Abstained today ) Current,   Cardiovascular - negative cardio ROS and normal exam    Rhythm: regular  Rate: normal        Neuro/Psych  (+) psychiatric history Anxiety and Depression,    GI/Hepatic/Renal/Endo    (+) obesity (BMI 31),      Musculoskeletal     (+) arthralgias, back pain,   Abdominal  - normal exam    Abdomen: soft.  Bowel sounds: normal.   Substance History - negative use     OB/GYN          Other   (+) arthritis   history of cancer    ROS/Med Hx Other: Lab reviewed h/h 10/35 k 4.5  ekg deferred per/ [facility] protocol  cxr nad                                    Anesthesia Plan    ASA 2     MAC   (Risks and benefits discussed including risk of aspiration, recall and dental damage. All patient questions answered. Will continue with POC.)  intravenous induction   Anesthetic plan and risks discussed with patient.

## 2017-09-01 ENCOUNTER — TELEPHONE (OUTPATIENT)
Dept: SURGERY | Facility: CLINIC | Age: 42
End: 2017-09-01

## 2017-09-01 LAB
LAB AP CASE REPORT: NORMAL
Lab: NORMAL
PATH REPORT.FINAL DX SPEC: NORMAL

## 2017-09-01 NOTE — TELEPHONE ENCOUNTER
Patient no showed for her post opt appointment.Unable to reach by phone no show letter mailed to patient.

## 2017-09-08 ENCOUNTER — INFUSION (OUTPATIENT)
Dept: ONCOLOGY | Facility: HOSPITAL | Age: 42
End: 2017-09-08

## 2017-09-08 VITALS
DIASTOLIC BLOOD PRESSURE: 64 MMHG | TEMPERATURE: 98.1 F | SYSTOLIC BLOOD PRESSURE: 111 MMHG | RESPIRATION RATE: 16 BRPM | HEART RATE: 71 BPM

## 2017-09-08 DIAGNOSIS — K90.9 IRON MALABSORPTION: Primary | ICD-10-CM

## 2017-09-08 DIAGNOSIS — D50.0 IRON DEFICIENCY ANEMIA DUE TO CHRONIC BLOOD LOSS: ICD-10-CM

## 2017-09-08 PROCEDURE — 96374 THER/PROPH/DIAG INJ IV PUSH: CPT

## 2017-09-08 PROCEDURE — 25010000002 FERUMOXYTOL 510 MG/17ML SOLUTION 510 MG VIAL: Performed by: NURSE PRACTITIONER

## 2017-09-08 PROCEDURE — 96375 TX/PRO/DX INJ NEW DRUG ADDON: CPT

## 2017-09-08 PROCEDURE — 25010000002 DIPHENHYDRAMINE PER 50 MG: Performed by: INTERNAL MEDICINE

## 2017-09-08 RX ORDER — DIPHENHYDRAMINE HYDROCHLORIDE 50 MG/ML
25 INJECTION INTRAMUSCULAR; INTRAVENOUS ONCE
Status: COMPLETED | OUTPATIENT
Start: 2017-09-08 | End: 2017-09-08

## 2017-09-08 RX ORDER — FAMOTIDINE 10 MG/ML
20 INJECTION, SOLUTION INTRAVENOUS ONCE
Status: COMPLETED | OUTPATIENT
Start: 2017-09-08 | End: 2017-09-08

## 2017-09-08 RX ORDER — SODIUM CHLORIDE 9 MG/ML
250 INJECTION, SOLUTION INTRAVENOUS ONCE
Status: DISCONTINUED | OUTPATIENT
Start: 2017-09-08 | End: 2017-09-08 | Stop reason: HOSPADM

## 2017-09-08 RX ADMIN — FERUMOXYTOL 510 MG: 510 INJECTION INTRAVENOUS at 10:30

## 2017-09-08 RX ADMIN — FAMOTIDINE 20 MG: 10 INJECTION, SOLUTION INTRAVENOUS at 10:12

## 2017-09-08 RX ADMIN — DIPHENHYDRAMINE HYDROCHLORIDE 25 MG: 50 INJECTION, SOLUTION INTRAMUSCULAR; INTRAVENOUS at 10:11

## 2017-09-11 ENCOUNTER — OFFICE VISIT (OUTPATIENT)
Dept: SURGERY | Facility: CLINIC | Age: 42
End: 2017-09-11

## 2017-09-11 VITALS
TEMPERATURE: 98.4 F | DIASTOLIC BLOOD PRESSURE: 64 MMHG | OXYGEN SATURATION: 98 % | WEIGHT: 196 LBS | RESPIRATION RATE: 16 BRPM | BODY MASS INDEX: 31.5 KG/M2 | SYSTOLIC BLOOD PRESSURE: 98 MMHG | HEIGHT: 66 IN | HEART RATE: 84 BPM

## 2017-09-11 DIAGNOSIS — Z48.89 POSTOPERATIVE VISIT: Primary | ICD-10-CM

## 2017-09-11 PROCEDURE — 99024 POSTOP FOLLOW-UP VISIT: CPT | Performed by: SURGERY

## 2017-09-11 NOTE — PROGRESS NOTES
Patient: Mary Celis    YOB: 1975    Date: 09/11/2017    Primary Care Provider: Provider Not In System    Reason for Consultation: Follow-up lap casandra    Chief Complaint:   Chief Complaint   Patient presents with   • Post-op     laparoscopic cholecystectomy.       History of present illness:  I saw the patient in the office today as a followup from their recent laparoscopic cholecystectomy which was done on 08/28/2017, pathology report showed chronic cholecystitis with cholesterolosis.  They state that they have done well and are having no complaints.    Vital Signs:   Temp:  [98.4 °F (36.9 °C)] 98.4 °F (36.9 °C)  Heart Rate:  [84] 84  Resp:  [16] 16  BP: (98)/(64) 98/64    Physical Exam:   General Appearance:    Alert, cooperative, in no acute distress   Abdomen:     no masses, no organomegaly, soft non-tender, non-distended, no guarding, wounds are well healed     Assessment / Plan :    1. Postoperative visit        I did discuss the situation with the patient today in the office and they have done well from their recent laparoscopic cholecystectomy.  I have released the patient back to normal activity, they understand that they need to be careful about heavy lifting.  I need to see the patient back in the office only if they are having further problems, they know to call me if they are.    Electronically signed by Albaro Lopez MD  09/11/17        Scribed for Albaro Lopez MD by Etta Sow. 9/11/2017  2:27 PM

## 2017-11-22 DIAGNOSIS — D50.0 IRON DEFICIENCY ANEMIA DUE TO CHRONIC BLOOD LOSS: Primary | ICD-10-CM

## 2017-11-22 DIAGNOSIS — K90.9 IRON MALABSORPTION: ICD-10-CM

## 2018-05-09 ENCOUNTER — OFFICE VISIT (OUTPATIENT)
Dept: OBSTETRICS AND GYNECOLOGY | Facility: CLINIC | Age: 43
End: 2018-05-09

## 2018-05-09 VITALS
BODY MASS INDEX: 31.66 KG/M2 | SYSTOLIC BLOOD PRESSURE: 144 MMHG | HEIGHT: 66 IN | WEIGHT: 197 LBS | DIASTOLIC BLOOD PRESSURE: 90 MMHG

## 2018-05-09 DIAGNOSIS — K59.00 CONSTIPATION, UNSPECIFIED CONSTIPATION TYPE: ICD-10-CM

## 2018-05-09 DIAGNOSIS — R10.2 PELVIC PAIN: Primary | ICD-10-CM

## 2018-05-09 PROCEDURE — 99204 OFFICE O/P NEW MOD 45 MIN: CPT | Performed by: OBSTETRICS & GYNECOLOGY

## 2018-05-09 RX ORDER — DESVENLAFAXINE 100 MG/1
TABLET, EXTENDED RELEASE ORAL
Refills: 1 | COMMUNITY
Start: 2018-05-08 | End: 2022-08-11 | Stop reason: HOSPADM

## 2018-05-09 RX ORDER — CLONAZEPAM 1 MG/1
TABLET ORAL
Refills: 1 | Status: ON HOLD | COMMUNITY
Start: 2018-05-08 | End: 2022-07-25

## 2018-05-09 NOTE — PROGRESS NOTES
Subjective   Chief Complaint   Patient presents with   • Endometriosis      Pt stated she had Ablation around 8 years ago, was told she had Endometriosis and states its getting worse.     Mary Celis is a 42 y.o. year old .  Patient's last menstrual period was 2018.  She presents to be seen because of Chronic pelvic pain particularly in the left lower quadrant, chronic anemia requiring blood transfusions.  Patient states she had a endometrial ablation as well as a diagnostic laparoscopy 8 years ago.  Her ablation is still working and she has minimal bleeding.  In terms of her laparoscopy patient relates she was told she had significant endometriosis at the time of the laparoscopy.  Patient has had an EGD as well as colonoscopy in the workup for her significant anemia that has required IV iron infusions as well as blood transfusions.  The workup thus far has been negative.  She does have hemorrhoids for which she is going for consultation for banding/removal.  Patient states that she has chronic constipation and will go many days without having a bowel movement.  Her pain is long-standing, persistent, achy with punctuations of sharp.     OTHER COMPLAINTS:  Nothing else    The following portions of the patient's history were reviewed and updated as appropriate:  She  has a past medical history of Abnormal Pap smear of cervix; Anemia; Arthritis; Arthritis; Body piercing; Cancer; Depression with anxiety; Gout; History of blood transfusion; History of transfusion; IBS (irritable bowel syndrome); Panic attack; Tattoo; and Wears glasses.  She  does not have any pertinent problems on file.  She  has a past surgical history that includes Tubal ligation; d&c hysteroscopy; Esophagogastroduodenoscopy (N/A, 2017); Colonoscopy (N/A, 2017); [place] tooth extraction; cholecystectomy with intraoperative cholangiogram (N/A, 2017); and Cholecystectomy.  Her family history includes Colon cancer in her  maternal grandfather; Diabetes in her father, maternal grandmother, and mother; Heart disease in her maternal grandmother and paternal grandmother.  She  reports that she has been smoking Cigarettes.  She has a 25.00 pack-year smoking history. She has never used smokeless tobacco. She reports that she drinks alcohol. She reports that she uses drugs, including Marijuana.  Current Outpatient Prescriptions   Medication Sig Dispense Refill   • clonazePAM (KlonoPIN) 1 MG tablet   1   • desvenlafaxine (PRISTIQ) 100 MG 24 hr tablet   1     No current facility-administered medications for this visit.      Current Outpatient Prescriptions on File Prior to Visit   Medication Sig   • [DISCONTINUED] ARIPiprazole (ABILIFY) 10 MG tablet Take 10 mg by mouth Daily.   • [DISCONTINUED] B Complex Vitamins (B-COMPLEX/B-12 PO) Take 1 tablet by mouth Daily.   • [DISCONTINUED] Cholecalciferol ([employer] VIT D 5000 HIGH-POTENCY PO) Take 1 tablet by mouth Daily.   • [DISCONTINUED] diazePAM (VALIUM) 5 MG tablet Take 5 mg by mouth Daily As Needed for Anxiety.     No current facility-administered medications on file prior to visit.      She has No Known Allergies.    Smoking status: Current Every Day Smoker                                                   Packs/day: 1.00      Years: 25.00        Types: Cigarettes  Smokeless tobacco: Never Used                        Review of Systems  Consitutional POS: fatigue    NEG: anorexia, night sweats, weight gain or weight loss   Gastointestinal POS: constipation (chronic)    NEG: bloating, change in bowel habits, melena or reflux symptoms   Genitourinary POS: nothing reported    NEG: dysuria or hematuria   Integument POS: nothing reported    NEG: moles that are changing in size, shape, color or rashes   Breast POS: nothing reported    NEG: persistent breast lump, skin dimpling or nipple discharge         Eyes: negative  Ears, nose, mouth, throat, and face: negative  Respiratory: negative  Cardiovascular:  "negative  GYN:  positive for  as above  Hematologic/lymphatic: positive for anemia  Musculoskeletal:negative  Neurological: positive for weakness  Behavioral/Psych: positive for anxiety and depression  Endocrine: negative  Allergic/Immunologic: negative          Objective   /90   Ht 167.6 cm (66\")   Wt 89.4 kg (197 lb)   LMP 05/08/2018   BMI 31.80 kg/m²     General:  well developed; well nourished  no acute distress  appears older than stated age   Skin:  No suspicious lesions seen   Thyroid: normal to inspection and palpation   Lungs:  breathing is unlabored  clear to auscultation bilaterally   Heart:  Not performed.   Breasts:  Not performed.   Abdomen: soft, non-tender; no masses  no umbilical or inginual hernias are present  no hepato-splenomegaly   Pelvis: Not performed.     Psychiatric: Alert and oriented ×3, mood and affect appropriate  HEENT: Atraumatic, normocephalic, normal scleral icterus  Extremities: 2+ pulses bilaterally, no edema      Lab Review   CBC from last summer Hct 35    Imaging   No data reviewed        Assessment   1. Personal history of endometriosis per patient via laparoscopy  2. Chronic pelvic pain  3. Chronic anemia of unknown etiology  4. Chronic constipation significant     Plan   1. Operative report requested from [facility] from 8 years ago.  Patient's return to clinic in 2 weeks for transvaginal sonogram.  Long discussion with patient and her mother regarding anemia as well.  As she is having minimal vaginal bleeding this is not a gynecologic source.  I have may be a gastrointestinal absorption issue for her.  This latter fact coupled with her chronic constipation warts of probable referral GI some time in the future.  2.     No orders of the defined types were placed in this encounter.         This note was electronically signed.      May 9, 2018      "

## 2018-06-01 ENCOUNTER — OFFICE VISIT (OUTPATIENT)
Dept: OBSTETRICS AND GYNECOLOGY | Facility: CLINIC | Age: 43
End: 2018-06-01

## 2018-06-01 VITALS
BODY MASS INDEX: 31.34 KG/M2 | WEIGHT: 195 LBS | SYSTOLIC BLOOD PRESSURE: 120 MMHG | HEIGHT: 66 IN | DIASTOLIC BLOOD PRESSURE: 80 MMHG

## 2018-06-01 DIAGNOSIS — N80.9 ENDOMETRIOSIS: Primary | ICD-10-CM

## 2018-06-01 PROCEDURE — 99213 OFFICE O/P EST LOW 20 MIN: CPT | Performed by: OBSTETRICS & GYNECOLOGY

## 2018-06-01 NOTE — PROGRESS NOTES
"Subjective   Chief Complaint   Patient presents with   • Follow-up     TVS- Pelvic Pain/Endometriosis     Mary Celis is a 42 y.o. year old .  Patient's last menstrual period was 2018.  She presents to be seen because of Follow-up evaluation pelvic pain with history of endometriosis.  Patient is here for ultrasound today.  Unfortunately would not receive her operative report  from [facility]..     OTHER COMPLAINTS:  Nothing else    The following portions of the patient's history were reviewed and updated as appropriate:current medications and allergies    Smoking status: Current Every Day Smoker                                                   Packs/day: 1.00      Years: 25.00        Types: Cigarettes  Smokeless tobacco: Never Used                        Review of Systems  Consitutional POS: fatigue    NEG: anorexia, fevers or night sweats   Gastointestinal POS: constipation (chronic)    NEG: bloating, change in bowel habits, melena or reflux symptoms   Genitourinary POS: nothing reported    NEG: dysuria or hematuria   Integument POS: nothing reported    NEG: moles that are changing in size, shape, color or rashes   Breast POS: nothing reported    NEG: persistent breast lump, skin dimpling or nipple discharge         Pertinent items are noted in HPI.          Objective   /80   Ht 167.6 cm (66\")   Wt 88.5 kg (195 lb)   LMP 2018   BMI 31.47 kg/m²     General:  well developed; well nourished  no acute distress   Skin:  No suspicious lesions seen   Thyroid: not examined   Lungs:  breathing is unlabored   Heart:  Not performed.   Breasts:  Not performed.   Abdomen: soft, non-tender; no masses  no umbilical or inginual hernias are present  no hepato-splenomegaly   Pelvis: Not performed.     Psychiatric: Alert and oriented ×3, mood and affect appropriate  HEENT: Atraumatic, normocephalic, normal scleral icterus  Extremities: 2+ pulses bilaterally, no edema      Lab Review   No data " reviewed    Imaging   Pelvic ultrasound images independantly reviewed - Uterus is 7.5 x 7.5 x 6 cm.  Volume 176 cm³.  The endometrium is consistent with prior ablation at 5 mm.  There is a right hemorrhagic versus endometrioma 3.5 cm cyst.  The left ovary is normal.  There are multiple intramural fibroids largest measures measuring 2.3 cm.        Assessment   1. Chronic pain with a history of endometriosis.  Still not receive the operative report.  Ultrasound shows an enlarged fibroid uterus with probable endometrioma.     Plan   1. Patient is going to the operative report herself and bring it back for me to review.  Further intervention is dependent upon the level of endometriosis noted.  As I was discussing patient's chronic pelvic pain she inquired what  pelvic pain?  Aiding she was here for evaluation of her anemia and while she does have some lower abdominal discomfort she does not have chronic pelvic pain.  Long discussion with patient regarding the ultrasound findings and that these may not be in indicative of anything of significance but would only have to be followed.  Patient also stated she was being worked up for some abnormal findings in her blood though she was unsure the name of the physician.  Overall the patient's history recall suboptimal and I had a discussion with her stating that hysterectomy is not going to simply cure her of all her complaints.  Is especially true in light of her stating she does not have chronic pelvic pain which she did state at the time of the last appointment.    No orders of the defined types were placed in this encounter.         This note was electronically signed.      June 1, 2018

## 2022-07-25 ENCOUNTER — APPOINTMENT (OUTPATIENT)
Dept: OTHER | Facility: HOSPITAL | Age: 47
End: 2022-07-25

## 2022-07-25 ENCOUNTER — HOSPITAL ENCOUNTER (INPATIENT)
Facility: HOSPITAL | Age: 47
LOS: 17 days | Discharge: HOME | End: 2022-08-11
Attending: INTERNAL MEDICINE | Admitting: INTERNAL MEDICINE

## 2022-07-25 DIAGNOSIS — I60.9 SAH (SUBARACHNOID HEMORRHAGE): Primary | ICD-10-CM

## 2022-07-25 DIAGNOSIS — D50.0 IRON DEFICIENCY ANEMIA DUE TO CHRONIC BLOOD LOSS: ICD-10-CM

## 2022-07-25 DIAGNOSIS — R10.11 RIGHT UPPER QUADRANT ABDOMINAL PAIN: ICD-10-CM

## 2022-07-25 DIAGNOSIS — K90.9 IRON MALABSORPTION: ICD-10-CM

## 2022-07-25 DIAGNOSIS — E87.1 HYPONATREMIA: ICD-10-CM

## 2022-07-25 DIAGNOSIS — R41.841 COGNITIVE COMMUNICATION DEFICIT: ICD-10-CM

## 2022-07-25 DIAGNOSIS — R51.9 ACUTE NONINTRACTABLE HEADACHE, UNSPECIFIED HEADACHE TYPE: ICD-10-CM

## 2022-07-25 DIAGNOSIS — I10 ESSENTIAL HYPERTENSION: ICD-10-CM

## 2022-07-25 LAB
ALBUMIN SERPL-MCNC: 4.4 G/DL (ref 3.5–5.2)
ALBUMIN/GLOB SERPL: 1.2 G/DL
ALP SERPL-CCNC: 130 U/L (ref 39–117)
ALT SERPL W P-5'-P-CCNC: 17 U/L (ref 1–33)
ANION GAP SERPL CALCULATED.3IONS-SCNC: 12 MMOL/L (ref 5–15)
APTT PPP: 30.3 SECONDS (ref 22–39)
AST SERPL-CCNC: 19 U/L (ref 1–32)
B PARAPERT DNA SPEC QL NAA+PROBE: NOT DETECTED
B PERT DNA SPEC QL NAA+PROBE: NOT DETECTED
BILIRUB SERPL-MCNC: 0.5 MG/DL (ref 0–1.2)
BUN SERPL-MCNC: 13 MG/DL (ref 6–20)
BUN/CREAT SERPL: 20 (ref 7–25)
C PNEUM DNA NPH QL NAA+NON-PROBE: NOT DETECTED
CALCIUM SPEC-SCNC: 9.9 MG/DL (ref 8.6–10.5)
CHLORIDE SERPL-SCNC: 99 MMOL/L (ref 98–107)
CHOLEST SERPL-MCNC: 252 MG/DL (ref 0–200)
CO2 SERPL-SCNC: 26 MMOL/L (ref 22–29)
CREAT SERPL-MCNC: 0.65 MG/DL (ref 0.57–1)
DEPRECATED RDW RBC AUTO: 46.2 FL (ref 37–54)
EGFRCR SERPLBLD CKD-EPI 2021: 110.1 ML/MIN/1.73
ERYTHROCYTE [DISTWIDTH] IN BLOOD BY AUTOMATED COUNT: 15.8 % (ref 12.3–15.4)
FLUAV SUBTYP SPEC NAA+PROBE: NOT DETECTED
FLUBV RNA ISLT QL NAA+PROBE: NOT DETECTED
GLOBULIN UR ELPH-MCNC: 3.8 GM/DL
GLUCOSE BLDC GLUCOMTR-MCNC: 148 MG/DL (ref 70–130)
GLUCOSE BLDC GLUCOMTR-MCNC: 196 MG/DL (ref 70–130)
GLUCOSE SERPL-MCNC: 188 MG/DL (ref 65–99)
HADV DNA SPEC NAA+PROBE: NOT DETECTED
HBA1C MFR BLD: 5.3 % (ref 4.8–5.6)
HCOV 229E RNA SPEC QL NAA+PROBE: NOT DETECTED
HCOV HKU1 RNA SPEC QL NAA+PROBE: NOT DETECTED
HCOV NL63 RNA SPEC QL NAA+PROBE: NOT DETECTED
HCOV OC43 RNA SPEC QL NAA+PROBE: NOT DETECTED
HCT VFR BLD AUTO: 44.2 % (ref 34–46.6)
HDLC SERPL-MCNC: 71 MG/DL (ref 40–60)
HGB BLD-MCNC: 13.8 G/DL (ref 12–15.9)
HMPV RNA NPH QL NAA+NON-PROBE: NOT DETECTED
HPIV1 RNA ISLT QL NAA+PROBE: NOT DETECTED
HPIV2 RNA SPEC QL NAA+PROBE: NOT DETECTED
HPIV3 RNA NPH QL NAA+PROBE: NOT DETECTED
HPIV4 P GENE NPH QL NAA+PROBE: NOT DETECTED
INR PPP: 0.96 (ref 0.84–1.13)
LDLC SERPL CALC-MCNC: 162 MG/DL (ref 0–100)
LDLC/HDLC SERPL: 2.25 {RATIO}
M PNEUMO IGG SER IA-ACNC: NOT DETECTED
MCH RBC QN AUTO: 25.2 PG (ref 26.6–33)
MCHC RBC AUTO-ENTMCNC: 31.2 G/DL (ref 31.5–35.7)
MCV RBC AUTO: 80.8 FL (ref 79–97)
PLATELET # BLD AUTO: 473 10*3/MM3 (ref 140–450)
PMV BLD AUTO: 10.5 FL (ref 6–12)
POTASSIUM SERPL-SCNC: 4.5 MMOL/L (ref 3.5–5.2)
PROT SERPL-MCNC: 8.2 G/DL (ref 6–8.5)
PROTHROMBIN TIME: 12.7 SECONDS (ref 11.4–14.4)
RBC # BLD AUTO: 5.47 10*6/MM3 (ref 3.77–5.28)
RHINOVIRUS RNA SPEC NAA+PROBE: NOT DETECTED
RSV RNA NPH QL NAA+NON-PROBE: NOT DETECTED
SARS-COV-2 RNA NPH QL NAA+NON-PROBE: NOT DETECTED
SODIUM SERPL-SCNC: 137 MMOL/L (ref 136–145)
TRIGL SERPL-MCNC: 107 MG/DL (ref 0–150)
TSH SERPL DL<=0.05 MIU/L-ACNC: 0.57 UIU/ML (ref 0.27–4.2)
VLDLC SERPL-MCNC: 19 MG/DL (ref 5–40)
WBC NRBC COR # BLD: 19.54 10*3/MM3 (ref 3.4–10.8)

## 2022-07-25 PROCEDURE — 99291 CRITICAL CARE FIRST HOUR: CPT | Performed by: INTERNAL MEDICINE

## 2022-07-25 PROCEDURE — 85610 PROTHROMBIN TIME: CPT | Performed by: INTERNAL MEDICINE

## 2022-07-25 PROCEDURE — 85730 THROMBOPLASTIN TIME PARTIAL: CPT | Performed by: INTERNAL MEDICINE

## 2022-07-25 PROCEDURE — 0202U NFCT DS 22 TRGT SARS-COV-2: CPT | Performed by: NEUROLOGICAL SURGERY

## 2022-07-25 PROCEDURE — 80061 LIPID PANEL: CPT | Performed by: INTERNAL MEDICINE

## 2022-07-25 PROCEDURE — 25010000002 FENTANYL CITRATE (PF) 50 MCG/ML SOLUTION: Performed by: INTERNAL MEDICINE

## 2022-07-25 PROCEDURE — 80050 GENERAL HEALTH PANEL: CPT | Performed by: INTERNAL MEDICINE

## 2022-07-25 PROCEDURE — 82962 GLUCOSE BLOOD TEST: CPT

## 2022-07-25 PROCEDURE — 99221 1ST HOSP IP/OBS SF/LOW 40: CPT | Performed by: PHYSICIAN ASSISTANT

## 2022-07-25 PROCEDURE — 83036 HEMOGLOBIN GLYCOSYLATED A1C: CPT | Performed by: INTERNAL MEDICINE

## 2022-07-25 RX ORDER — NIMODIPINE 30 MG/1
60 CAPSULE, LIQUID FILLED ORAL
Status: DISCONTINUED | OUTPATIENT
Start: 2022-07-25 | End: 2022-07-25 | Stop reason: SDUPTHER

## 2022-07-25 RX ORDER — NIMODIPINE 30 MG/1
60 CAPSULE, LIQUID FILLED ORAL
Status: DISCONTINUED | OUTPATIENT
Start: 2022-07-25 | End: 2022-07-25

## 2022-07-25 RX ORDER — NICOTINE 21 MG/24HR
1 PATCH, TRANSDERMAL 24 HOURS TRANSDERMAL
Status: DISCONTINUED | OUTPATIENT
Start: 2022-07-25 | End: 2022-07-31

## 2022-07-25 RX ORDER — ACETAMINOPHEN 325 MG/1
650 TABLET ORAL EVERY 4 HOURS PRN
Status: DISCONTINUED | OUTPATIENT
Start: 2022-07-25 | End: 2022-07-25 | Stop reason: SDUPTHER

## 2022-07-25 RX ORDER — FENTANYL CITRATE 50 UG/ML
25 INJECTION, SOLUTION INTRAMUSCULAR; INTRAVENOUS
Status: DISCONTINUED | OUTPATIENT
Start: 2022-07-25 | End: 2022-07-28

## 2022-07-25 RX ORDER — ACETAMINOPHEN 650 MG/1
650 SUPPOSITORY RECTAL EVERY 4 HOURS PRN
Status: DISCONTINUED | OUTPATIENT
Start: 2022-07-25 | End: 2022-07-25 | Stop reason: SDUPTHER

## 2022-07-25 RX ORDER — SODIUM CHLORIDE 0.9 % (FLUSH) 0.9 %
10 SYRINGE (ML) INJECTION EVERY 12 HOURS SCHEDULED
Status: DISCONTINUED | OUTPATIENT
Start: 2022-07-25 | End: 2022-07-31

## 2022-07-25 RX ORDER — LABETALOL HYDROCHLORIDE 5 MG/ML
10 INJECTION, SOLUTION INTRAVENOUS
Status: DISCONTINUED | OUTPATIENT
Start: 2022-07-25 | End: 2022-08-11 | Stop reason: HOSPADM

## 2022-07-25 RX ORDER — ONDANSETRON 2 MG/ML
4 INJECTION INTRAMUSCULAR; INTRAVENOUS EVERY 6 HOURS PRN
Status: DISCONTINUED | OUTPATIENT
Start: 2022-07-25 | End: 2022-07-31

## 2022-07-25 RX ORDER — SODIUM CHLORIDE 9 MG/ML
50 INJECTION, SOLUTION INTRAVENOUS CONTINUOUS
Status: DISCONTINUED | OUTPATIENT
Start: 2022-07-25 | End: 2022-07-30

## 2022-07-25 RX ORDER — ENALAPRILAT 2.5 MG/2ML
1.25 INJECTION INTRAVENOUS EVERY 6 HOURS PRN
Status: DISCONTINUED | OUTPATIENT
Start: 2022-07-25 | End: 2022-08-11 | Stop reason: HOSPADM

## 2022-07-25 RX ORDER — ACETAMINOPHEN 650 MG/1
650 SUPPOSITORY RECTAL EVERY 4 HOURS PRN
Status: DISCONTINUED | OUTPATIENT
Start: 2022-07-25 | End: 2022-07-31

## 2022-07-25 RX ORDER — SODIUM CHLORIDE 0.9 % (FLUSH) 0.9 %
10 SYRINGE (ML) INJECTION AS NEEDED
Status: DISCONTINUED | OUTPATIENT
Start: 2022-07-25 | End: 2022-07-31

## 2022-07-25 RX ORDER — NIMODIPINE 30 MG/1
60 CAPSULE, LIQUID FILLED ORAL
Status: DISCONTINUED | OUTPATIENT
Start: 2022-07-25 | End: 2022-07-29 | Stop reason: SDUPTHER

## 2022-07-25 RX ORDER — ACETAMINOPHEN 325 MG/1
650 TABLET ORAL EVERY 4 HOURS PRN
Status: DISCONTINUED | OUTPATIENT
Start: 2022-07-25 | End: 2022-07-31

## 2022-07-25 RX ORDER — SODIUM CHLORIDE 9 MG/ML
75 INJECTION, SOLUTION INTRAVENOUS CONTINUOUS
Status: DISCONTINUED | OUTPATIENT
Start: 2022-07-25 | End: 2022-07-25

## 2022-07-25 RX ORDER — NIMODIPINE 30 MG/1
30 CAPSULE, LIQUID FILLED ORAL
Status: DISCONTINUED | OUTPATIENT
Start: 2022-07-25 | End: 2022-07-25

## 2022-07-25 RX ADMIN — Medication 1 PATCH: at 21:00

## 2022-07-25 RX ADMIN — NIMODIPINE 60 MG: 30 CAPSULE, LIQUID FILLED ORAL at 17:46

## 2022-07-25 RX ADMIN — SODIUM CHLORIDE 100 ML/HR: 9 INJECTION, SOLUTION INTRAVENOUS at 17:46

## 2022-07-25 RX ADMIN — NIMODIPINE 60 MG: 30 CAPSULE, LIQUID FILLED ORAL at 21:00

## 2022-07-25 RX ADMIN — ACETAMINOPHEN 325MG 650 MG: 325 TABLET ORAL at 17:28

## 2022-07-25 RX ADMIN — Medication 10 ML: at 21:03

## 2022-07-25 RX ADMIN — NICARDIPINE HYDROCHLORIDE 20 MG: 0.1 INJECTION, SOLUTION INTRAVENOUS at 16:16

## 2022-07-25 RX ADMIN — FENTANYL CITRATE 25 MCG: 50 INJECTION, SOLUTION INTRAMUSCULAR; INTRAVENOUS at 22:08

## 2022-07-25 RX ADMIN — FENTANYL CITRATE 25 MCG: 50 INJECTION, SOLUTION INTRAMUSCULAR; INTRAVENOUS at 19:46

## 2022-07-25 RX ADMIN — NICARDIPINE HYDROCHLORIDE 5 MG/HR: 25 INJECTION, SOLUTION INTRAVENOUS at 18:44

## 2022-07-26 LAB
AMPHET+METHAMPHET UR QL: POSITIVE
AMPHETAMINES UR QL: POSITIVE
BARBITURATES UR QL SCN: NEGATIVE
BENZODIAZ UR QL SCN: NEGATIVE
BUPRENORPHINE SERPL-MCNC: NEGATIVE NG/ML
CANNABINOIDS SERPL QL: NEGATIVE
COCAINE UR QL: NEGATIVE
GLUCOSE BLDC GLUCOMTR-MCNC: 120 MG/DL (ref 70–130)
GLUCOSE BLDC GLUCOMTR-MCNC: 131 MG/DL (ref 70–130)
GLUCOSE BLDC GLUCOMTR-MCNC: 187 MG/DL (ref 70–130)
METHADONE UR QL SCN: NEGATIVE
OPIATES UR QL: POSITIVE
OXYCODONE UR QL SCN: POSITIVE
PCP UR QL SCN: NEGATIVE
PROPOXYPH UR QL: NEGATIVE
TRICYCLICS UR QL SCN: NEGATIVE

## 2022-07-26 PROCEDURE — C1769 GUIDE WIRE: HCPCS | Performed by: RADIOLOGY

## 2022-07-26 PROCEDURE — 36224 PLACE CATH CAROTD ART: CPT | Performed by: RADIOLOGY

## 2022-07-26 PROCEDURE — 25010000002 MAGNESIUM SULFATE 2 GM/50ML SOLUTION: Performed by: NURSE PRACTITIONER

## 2022-07-26 PROCEDURE — B318YZZ FLUOROSCOPY OF BILATERAL INTERNAL CAROTID ARTERIES USING OTHER CONTRAST: ICD-10-PCS | Performed by: RADIOLOGY

## 2022-07-26 PROCEDURE — 0 IODIXANOL PER 1 ML: Performed by: RADIOLOGY

## 2022-07-26 PROCEDURE — 99152 MOD SED SAME PHYS/QHP 5/>YRS: CPT | Performed by: RADIOLOGY

## 2022-07-26 PROCEDURE — 25010000002 FENTANYL CITRATE (PF) 50 MCG/ML SOLUTION: Performed by: INTERNAL MEDICINE

## 2022-07-26 PROCEDURE — 36226 PLACE CATH VERTEBRAL ART: CPT | Performed by: RADIOLOGY

## 2022-07-26 PROCEDURE — 99291 CRITICAL CARE FIRST HOUR: CPT | Performed by: INTERNAL MEDICINE

## 2022-07-26 PROCEDURE — 92523 SPEECH SOUND LANG COMPREHEN: CPT

## 2022-07-26 PROCEDURE — C1894 INTRO/SHEATH, NON-LASER: HCPCS | Performed by: RADIOLOGY

## 2022-07-26 PROCEDURE — 36227 PLACE CATH XTRNL CAROTID: CPT | Performed by: RADIOLOGY

## 2022-07-26 PROCEDURE — B31GYZZ FLUOROSCOPY OF BILATERAL VERTEBRAL ARTERIES USING OTHER CONTRAST: ICD-10-PCS | Performed by: RADIOLOGY

## 2022-07-26 PROCEDURE — 25010000002 MIDAZOLAM PER 1 MG: Performed by: RADIOLOGY

## 2022-07-26 PROCEDURE — B31BYZZ FLUOROSCOPY OF LEFT EXTERNAL CAROTID ARTERY USING OTHER CONTRAST: ICD-10-PCS | Performed by: RADIOLOGY

## 2022-07-26 PROCEDURE — 80306 DRUG TEST PRSMV INSTRMNT: CPT | Performed by: NEUROLOGICAL SURGERY

## 2022-07-26 PROCEDURE — 25010000002 FENTANYL CITRATE (PF) 50 MCG/ML SOLUTION: Performed by: RADIOLOGY

## 2022-07-26 PROCEDURE — 82962 GLUCOSE BLOOD TEST: CPT

## 2022-07-26 PROCEDURE — C1760 CLOSURE DEV, VASC: HCPCS | Performed by: RADIOLOGY

## 2022-07-26 RX ORDER — HYDROCODONE BITARTRATE AND ACETAMINOPHEN 5; 325 MG/1; MG/1
1 TABLET ORAL EVERY 6 HOURS PRN
Status: DISCONTINUED | OUTPATIENT
Start: 2022-07-26 | End: 2022-07-27

## 2022-07-26 RX ORDER — MIDAZOLAM HYDROCHLORIDE 1 MG/ML
INJECTION INTRAMUSCULAR; INTRAVENOUS AS NEEDED
Status: DISCONTINUED | OUTPATIENT
Start: 2022-07-26 | End: 2022-07-26 | Stop reason: HOSPADM

## 2022-07-26 RX ORDER — FENTANYL CITRATE 50 UG/ML
INJECTION, SOLUTION INTRAMUSCULAR; INTRAVENOUS AS NEEDED
Status: DISCONTINUED | OUTPATIENT
Start: 2022-07-26 | End: 2022-07-26 | Stop reason: HOSPADM

## 2022-07-26 RX ORDER — SODIUM CHLORIDE 9 MG/ML
75 INJECTION, SOLUTION INTRAVENOUS CONTINUOUS
Status: ACTIVE | OUTPATIENT
Start: 2022-07-26 | End: 2022-07-26

## 2022-07-26 RX ORDER — LIDOCAINE HYDROCHLORIDE 10 MG/ML
INJECTION, SOLUTION EPIDURAL; INFILTRATION; INTRACAUDAL; PERINEURAL AS NEEDED
Status: DISCONTINUED | OUTPATIENT
Start: 2022-07-26 | End: 2022-07-26 | Stop reason: HOSPADM

## 2022-07-26 RX ORDER — SODIUM CHLORIDE 0.9 % (FLUSH) 0.9 %
10 SYRINGE (ML) INJECTION EVERY 12 HOURS SCHEDULED
Status: DISCONTINUED | OUTPATIENT
Start: 2022-07-26 | End: 2022-07-31

## 2022-07-26 RX ORDER — MAGNESIUM SULFATE HEPTAHYDRATE 40 MG/ML
2 INJECTION, SOLUTION INTRAVENOUS ONCE
Status: COMPLETED | OUTPATIENT
Start: 2022-07-26 | End: 2022-07-26

## 2022-07-26 RX ORDER — LISINOPRIL 5 MG/1
5 TABLET ORAL
Status: DISCONTINUED | OUTPATIENT
Start: 2022-07-26 | End: 2022-08-05

## 2022-07-26 RX ORDER — SODIUM CHLORIDE 0.9 % (FLUSH) 0.9 %
10 SYRINGE (ML) INJECTION AS NEEDED
Status: DISCONTINUED | OUTPATIENT
Start: 2022-07-26 | End: 2022-07-31

## 2022-07-26 RX ORDER — IODIXANOL 320 MG/ML
INJECTION, SOLUTION INTRAVASCULAR AS NEEDED
Status: DISCONTINUED | OUTPATIENT
Start: 2022-07-26 | End: 2022-07-26 | Stop reason: HOSPADM

## 2022-07-26 RX ORDER — AMLODIPINE BESYLATE 5 MG/1
5 TABLET ORAL
Status: DISCONTINUED | OUTPATIENT
Start: 2022-07-26 | End: 2022-07-26

## 2022-07-26 RX ADMIN — NIMODIPINE 60 MG: 30 CAPSULE, LIQUID FILLED ORAL at 14:58

## 2022-07-26 RX ADMIN — NIMODIPINE 60 MG: 30 CAPSULE, LIQUID FILLED ORAL at 02:02

## 2022-07-26 RX ADMIN — Medication 1 PATCH: at 11:26

## 2022-07-26 RX ADMIN — Medication 10 ML: at 22:02

## 2022-07-26 RX ADMIN — SODIUM CHLORIDE 75 ML/HR: 9 INJECTION, SOLUTION INTRAVENOUS at 11:27

## 2022-07-26 RX ADMIN — HYDROCODONE BITARTRATE AND ACETAMINOPHEN 1 TABLET: 5; 325 TABLET ORAL at 13:39

## 2022-07-26 RX ADMIN — LISINOPRIL 5 MG: 5 TABLET ORAL at 16:21

## 2022-07-26 RX ADMIN — Medication 10 ML: at 11:27

## 2022-07-26 RX ADMIN — NIMODIPINE 60 MG: 30 CAPSULE, LIQUID FILLED ORAL at 07:20

## 2022-07-26 RX ADMIN — FENTANYL CITRATE 25 MCG: 50 INJECTION, SOLUTION INTRAMUSCULAR; INTRAVENOUS at 00:15

## 2022-07-26 RX ADMIN — HYDROCODONE BITARTRATE AND ACETAMINOPHEN 1 TABLET: 5; 325 TABLET ORAL at 20:51

## 2022-07-26 RX ADMIN — NICARDIPINE HYDROCHLORIDE 2.5 MG/HR: 25 INJECTION, SOLUTION INTRAVENOUS at 14:03

## 2022-07-26 RX ADMIN — NICARDIPINE HYDROCHLORIDE 5 MG/HR: 25 INJECTION, SOLUTION INTRAVENOUS at 02:31

## 2022-07-26 RX ADMIN — FENTANYL CITRATE 25 MCG: 50 INJECTION, SOLUTION INTRAMUSCULAR; INTRAVENOUS at 20:01

## 2022-07-26 RX ADMIN — NIMODIPINE 60 MG: 30 CAPSULE, LIQUID FILLED ORAL at 17:52

## 2022-07-26 RX ADMIN — FENTANYL CITRATE 25 MCG: 50 INJECTION, SOLUTION INTRAMUSCULAR; INTRAVENOUS at 22:01

## 2022-07-26 RX ADMIN — SODIUM CHLORIDE 100 ML/HR: 9 INJECTION, SOLUTION INTRAVENOUS at 03:53

## 2022-07-26 RX ADMIN — NIMODIPINE 60 MG: 30 CAPSULE, LIQUID FILLED ORAL at 22:01

## 2022-07-26 RX ADMIN — SODIUM CHLORIDE 75 ML/HR: 9 INJECTION, SOLUTION INTRAVENOUS at 16:21

## 2022-07-26 RX ADMIN — MAGNESIUM SULFATE HEPTAHYDRATE 2 G: 2 INJECTION, SOLUTION INTRAVENOUS at 03:07

## 2022-07-26 RX ADMIN — FENTANYL CITRATE 25 MCG: 50 INJECTION, SOLUTION INTRAMUSCULAR; INTRAVENOUS at 17:52

## 2022-07-26 RX ADMIN — FENTANYL CITRATE 25 MCG: 50 INJECTION, SOLUTION INTRAMUSCULAR; INTRAVENOUS at 11:24

## 2022-07-26 RX ADMIN — FENTANYL CITRATE 25 MCG: 50 INJECTION, SOLUTION INTRAMUSCULAR; INTRAVENOUS at 02:41

## 2022-07-26 RX ADMIN — Medication 10 ML: at 11:25

## 2022-07-26 RX ADMIN — NIMODIPINE 60 MG: 30 CAPSULE, LIQUID FILLED ORAL at 11:25

## 2022-07-26 RX ADMIN — HYDROCODONE BITARTRATE AND ACETAMINOPHEN 1 TABLET: 5; 325 TABLET ORAL at 06:11

## 2022-07-27 ENCOUNTER — APPOINTMENT (OUTPATIENT)
Dept: CARDIOLOGY | Facility: HOSPITAL | Age: 47
End: 2022-07-27

## 2022-07-27 LAB
BH CV VAS TCD LEFT ACA: 83 CM/SEC
BH CV VAS TCD LEFT DISTAL M1: 72 CM/SEC
BH CV VAS TCD LEFT MID M1: 81 CM/SEC
BH CV VAS TCD LEFT P1: 44 CM/SEC
BH CV VAS TCD LEFT P2: 41 CM/SEC
BH CV VAS TCD LEFT PROXIMAL M1: 74 CM/SEC
BH CV VAS TCD LEFT TERMINAL ICA: 64 CM/SEC
BH CV VAS TCD RIGHT ACA: 69 CM/SEC
BH CV VAS TCD RIGHT DISTAL M1: 54 CM/SEC
BH CV VAS TCD RIGHT MID M1: 64 CM/SEC
BH CV VAS TCD RIGHT P1: 53 CM/SEC
BH CV VAS TCD RIGHT P2: 22 CM/SEC
BH CV VAS TCD RIGHT PROXIMAL M1: 78 CM/SEC
BH CV VAS TCD RIGHT TERMINAL ICA: 55 CM/SEC
GLUCOSE BLDC GLUCOMTR-MCNC: 100 MG/DL (ref 70–130)
GLUCOSE BLDC GLUCOMTR-MCNC: 115 MG/DL (ref 70–130)
GLUCOSE BLDC GLUCOMTR-MCNC: 128 MG/DL (ref 70–130)
GLUCOSE BLDC GLUCOMTR-MCNC: 138 MG/DL (ref 70–130)
GLUCOSE BLDC GLUCOMTR-MCNC: 184 MG/DL (ref 70–130)
MAXIMAL PREDICTED HEART RATE: 174 BPM
STRESS TARGET HR: 148 BPM

## 2022-07-27 PROCEDURE — 99231 SBSQ HOSP IP/OBS SF/LOW 25: CPT | Performed by: PHYSICIAN ASSISTANT

## 2022-07-27 PROCEDURE — 97161 PT EVAL LOW COMPLEX 20 MIN: CPT

## 2022-07-27 PROCEDURE — 99233 SBSQ HOSP IP/OBS HIGH 50: CPT | Performed by: INTERNAL MEDICINE

## 2022-07-27 PROCEDURE — 93888 INTRACRANIAL LIMITED STUDY: CPT

## 2022-07-27 PROCEDURE — 97165 OT EVAL LOW COMPLEX 30 MIN: CPT

## 2022-07-27 PROCEDURE — 25010000002 DEXAMETHASONE PER 1 MG: Performed by: NEUROLOGICAL SURGERY

## 2022-07-27 PROCEDURE — 82962 GLUCOSE BLOOD TEST: CPT

## 2022-07-27 PROCEDURE — 25010000002 FENTANYL CITRATE (PF) 50 MCG/ML SOLUTION: Performed by: RADIOLOGY

## 2022-07-27 RX ORDER — PANTOPRAZOLE SODIUM 40 MG/1
40 TABLET, DELAYED RELEASE ORAL
Status: DISCONTINUED | OUTPATIENT
Start: 2022-07-28 | End: 2022-07-31

## 2022-07-27 RX ORDER — DEXAMETHASONE SODIUM PHOSPHATE 4 MG/ML
2 INJECTION, SOLUTION INTRA-ARTICULAR; INTRALESIONAL; INTRAMUSCULAR; INTRAVENOUS; SOFT TISSUE EVERY 8 HOURS
Status: DISCONTINUED | OUTPATIENT
Start: 2022-07-27 | End: 2022-07-28

## 2022-07-27 RX ORDER — HYDROCODONE BITARTRATE AND ACETAMINOPHEN 10; 325 MG/1; MG/1
1 TABLET ORAL EVERY 6 HOURS PRN
Status: DISCONTINUED | OUTPATIENT
Start: 2022-07-27 | End: 2022-07-28

## 2022-07-27 RX ADMIN — SODIUM CHLORIDE 100 ML/HR: 9 INJECTION, SOLUTION INTRAVENOUS at 21:19

## 2022-07-27 RX ADMIN — DEXAMETHASONE SODIUM PHOSPHATE 2 MG: 4 INJECTION INTRA-ARTICULAR; INTRALESIONAL; INTRAMUSCULAR; INTRAVENOUS; SOFT TISSUE at 21:15

## 2022-07-27 RX ADMIN — HYDROCODONE BITARTRATE AND ACETAMINOPHEN 1 TABLET: 10; 325 TABLET ORAL at 10:56

## 2022-07-27 RX ADMIN — Medication 10 ML: at 21:31

## 2022-07-27 RX ADMIN — LISINOPRIL 5 MG: 5 TABLET ORAL at 08:22

## 2022-07-27 RX ADMIN — NIMODIPINE 60 MG: 30 CAPSULE, LIQUID FILLED ORAL at 09:30

## 2022-07-27 RX ADMIN — NIMODIPINE 60 MG: 30 CAPSULE, LIQUID FILLED ORAL at 06:28

## 2022-07-27 RX ADMIN — FENTANYL CITRATE 25 MCG: 50 INJECTION, SOLUTION INTRAMUSCULAR; INTRAVENOUS at 21:14

## 2022-07-27 RX ADMIN — NIMODIPINE 60 MG: 30 CAPSULE, LIQUID FILLED ORAL at 02:09

## 2022-07-27 RX ADMIN — HYDROCODONE BITARTRATE AND ACETAMINOPHEN 1 TABLET: 10; 325 TABLET ORAL at 17:44

## 2022-07-27 RX ADMIN — NIMODIPINE 60 MG: 30 CAPSULE, LIQUID FILLED ORAL at 13:20

## 2022-07-27 RX ADMIN — NIMODIPINE 60 MG: 30 CAPSULE, LIQUID FILLED ORAL at 21:31

## 2022-07-27 RX ADMIN — SODIUM CHLORIDE 100 ML/HR: 9 INJECTION, SOLUTION INTRAVENOUS at 17:51

## 2022-07-27 RX ADMIN — FENTANYL CITRATE 25 MCG: 50 INJECTION, SOLUTION INTRAMUSCULAR; INTRAVENOUS at 00:33

## 2022-07-27 RX ADMIN — Medication 1 PATCH: at 08:24

## 2022-07-27 RX ADMIN — FENTANYL CITRATE 25 MCG: 50 INJECTION, SOLUTION INTRAMUSCULAR; INTRAVENOUS at 06:28

## 2022-07-27 RX ADMIN — FENTANYL CITRATE 25 MCG: 50 INJECTION, SOLUTION INTRAMUSCULAR; INTRAVENOUS at 14:55

## 2022-07-27 RX ADMIN — FENTANYL CITRATE 25 MCG: 50 INJECTION, SOLUTION INTRAMUSCULAR; INTRAVENOUS at 10:56

## 2022-07-27 RX ADMIN — Medication 10 ML: at 21:32

## 2022-07-27 RX ADMIN — FENTANYL CITRATE 25 MCG: 50 INJECTION, SOLUTION INTRAMUSCULAR; INTRAVENOUS at 08:54

## 2022-07-27 RX ADMIN — HYDROCODONE BITARTRATE AND ACETAMINOPHEN 1 TABLET: 5; 325 TABLET ORAL at 05:20

## 2022-07-27 RX ADMIN — Medication 10 ML: at 08:23

## 2022-07-27 RX ADMIN — DEXAMETHASONE SODIUM PHOSPHATE 2 MG: 4 INJECTION INTRA-ARTICULAR; INTRALESIONAL; INTRAMUSCULAR; INTRAVENOUS; SOFT TISSUE at 13:21

## 2022-07-27 RX ADMIN — HYDROCODONE BITARTRATE AND ACETAMINOPHEN 1 TABLET: 10; 325 TABLET ORAL at 23:44

## 2022-07-27 RX ADMIN — SODIUM CHLORIDE 100 ML/HR: 9 INJECTION, SOLUTION INTRAVENOUS at 05:22

## 2022-07-27 RX ADMIN — NIMODIPINE 60 MG: 30 CAPSULE, LIQUID FILLED ORAL at 17:44

## 2022-07-28 ENCOUNTER — APPOINTMENT (OUTPATIENT)
Dept: CT IMAGING | Facility: HOSPITAL | Age: 47
End: 2022-07-28

## 2022-07-28 ENCOUNTER — APPOINTMENT (OUTPATIENT)
Dept: CARDIOLOGY | Facility: HOSPITAL | Age: 47
End: 2022-07-28

## 2022-07-28 LAB
ANION GAP SERPL CALCULATED.3IONS-SCNC: 10 MMOL/L (ref 5–15)
BH CV VAS TCD LEFT DISTAL M1: 100 CM/SEC
BH CV VAS TCD LEFT MID M1: 102 CM/SEC
BH CV VAS TCD LEFT PROXIMAL M1: 98 CM/SEC
BH CV VAS TCD LEFT TERMINAL ICA: 71 CM/SEC
BH CV VAS TCD RIGHT DISTAL M1: 48 CM/SEC
BH CV VAS TCD RIGHT MID M1: 52 CM/SEC
BH CV VAS TCD RIGHT PROXIMAL M1: 108 CM/SEC
BH CV VAS TCD RIGHT TERMINAL ICA: 64 CM/SEC
BUN SERPL-MCNC: 13 MG/DL (ref 6–20)
BUN/CREAT SERPL: 21 (ref 7–25)
CALCIUM SPEC-SCNC: 9.2 MG/DL (ref 8.6–10.5)
CHLORIDE SERPL-SCNC: 100 MMOL/L (ref 98–107)
CO2 SERPL-SCNC: 25 MMOL/L (ref 22–29)
CREAT SERPL-MCNC: 0.62 MG/DL (ref 0.57–1)
EGFRCR SERPLBLD CKD-EPI 2021: 111.4 ML/MIN/1.73
GLUCOSE BLDC GLUCOMTR-MCNC: 117 MG/DL (ref 70–130)
GLUCOSE BLDC GLUCOMTR-MCNC: 140 MG/DL (ref 70–130)
GLUCOSE BLDC GLUCOMTR-MCNC: 142 MG/DL (ref 70–130)
GLUCOSE BLDC GLUCOMTR-MCNC: 316 MG/DL (ref 70–130)
GLUCOSE SERPL-MCNC: 152 MG/DL (ref 65–99)
MAXIMAL PREDICTED HEART RATE: 174 BPM
POTASSIUM SERPL-SCNC: 4.1 MMOL/L (ref 3.5–5.2)
SODIUM SERPL-SCNC: 135 MMOL/L (ref 136–145)
STRESS TARGET HR: 148 BPM

## 2022-07-28 PROCEDURE — 93888 INTRACRANIAL LIMITED STUDY: CPT

## 2022-07-28 PROCEDURE — 25010000002 FENTANYL CITRATE (PF) 50 MCG/ML SOLUTION: Performed by: RADIOLOGY

## 2022-07-28 PROCEDURE — 25010000002 DEXAMETHASONE PER 1 MG: Performed by: NEUROLOGICAL SURGERY

## 2022-07-28 PROCEDURE — 80048 BASIC METABOLIC PNL TOTAL CA: CPT | Performed by: INTERNAL MEDICINE

## 2022-07-28 PROCEDURE — 70450 CT HEAD/BRAIN W/O DYE: CPT

## 2022-07-28 PROCEDURE — 82962 GLUCOSE BLOOD TEST: CPT

## 2022-07-28 PROCEDURE — 25010000002 HYDROMORPHONE PER 4 MG: Performed by: INTERNAL MEDICINE

## 2022-07-28 PROCEDURE — 25010000002 HYDROMORPHONE 1 MG/ML SOLUTION: Performed by: RADIOLOGY

## 2022-07-28 PROCEDURE — 99232 SBSQ HOSP IP/OBS MODERATE 35: CPT | Performed by: INTERNAL MEDICINE

## 2022-07-28 PROCEDURE — 25010000002 HYDROMORPHONE 1 MG/ML SOLUTION: Performed by: PHYSICIAN ASSISTANT

## 2022-07-28 PROCEDURE — 99232 SBSQ HOSP IP/OBS MODERATE 35: CPT | Performed by: PHYSICIAN ASSISTANT

## 2022-07-28 RX ORDER — HYDROMORPHONE HYDROCHLORIDE 1 MG/ML
0.5 INJECTION, SOLUTION INTRAMUSCULAR; INTRAVENOUS; SUBCUTANEOUS EVERY 4 HOURS PRN
Status: DISCONTINUED | OUTPATIENT
Start: 2022-07-28 | End: 2022-07-28

## 2022-07-28 RX ORDER — DEXAMETHASONE SODIUM PHOSPHATE 4 MG/ML
4 INJECTION, SOLUTION INTRA-ARTICULAR; INTRALESIONAL; INTRAMUSCULAR; INTRAVENOUS; SOFT TISSUE EVERY 6 HOURS SCHEDULED
Status: DISCONTINUED | OUTPATIENT
Start: 2022-07-28 | End: 2022-07-31

## 2022-07-28 RX ORDER — FENTANYL CITRATE 50 UG/ML
50 INJECTION, SOLUTION INTRAMUSCULAR; INTRAVENOUS
Status: DISCONTINUED | OUTPATIENT
Start: 2022-07-28 | End: 2022-07-28

## 2022-07-28 RX ORDER — OXYCODONE HYDROCHLORIDE AND ACETAMINOPHEN 5; 325 MG/1; MG/1
1 TABLET ORAL EVERY 4 HOURS PRN
Status: DISCONTINUED | OUTPATIENT
Start: 2022-07-28 | End: 2022-07-30

## 2022-07-28 RX ADMIN — Medication 10 ML: at 08:50

## 2022-07-28 RX ADMIN — DEXAMETHASONE SODIUM PHOSPHATE 4 MG: 4 INJECTION INTRA-ARTICULAR; INTRALESIONAL; INTRAMUSCULAR; INTRAVENOUS; SOFT TISSUE at 23:17

## 2022-07-28 RX ADMIN — OXYCODONE HYDROCHLORIDE AND ACETAMINOPHEN 1 TABLET: 5; 325 TABLET ORAL at 11:20

## 2022-07-28 RX ADMIN — NIMODIPINE 60 MG: 30 CAPSULE, LIQUID FILLED ORAL at 05:01

## 2022-07-28 RX ADMIN — DEXAMETHASONE SODIUM PHOSPHATE 4 MG: 4 INJECTION INTRA-ARTICULAR; INTRALESIONAL; INTRAMUSCULAR; INTRAVENOUS; SOFT TISSUE at 18:58

## 2022-07-28 RX ADMIN — LISINOPRIL 5 MG: 5 TABLET ORAL at 08:49

## 2022-07-28 RX ADMIN — OXYCODONE HYDROCHLORIDE AND ACETAMINOPHEN 1 TABLET: 5; 325 TABLET ORAL at 20:46

## 2022-07-28 RX ADMIN — DEXAMETHASONE SODIUM PHOSPHATE 2 MG: 4 INJECTION INTRA-ARTICULAR; INTRALESIONAL; INTRAMUSCULAR; INTRAVENOUS; SOFT TISSUE at 05:01

## 2022-07-28 RX ADMIN — DEXAMETHASONE SODIUM PHOSPHATE 4 MG: 4 INJECTION INTRA-ARTICULAR; INTRALESIONAL; INTRAMUSCULAR; INTRAVENOUS; SOFT TISSUE at 11:21

## 2022-07-28 RX ADMIN — NIMODIPINE 60 MG: 30 CAPSULE, LIQUID FILLED ORAL at 09:00

## 2022-07-28 RX ADMIN — OXYCODONE HYDROCHLORIDE AND ACETAMINOPHEN 1 TABLET: 5; 325 TABLET ORAL at 15:33

## 2022-07-28 RX ADMIN — NIMODIPINE 60 MG: 30 CAPSULE, LIQUID FILLED ORAL at 01:47

## 2022-07-28 RX ADMIN — NIMODIPINE 60 MG: 30 CAPSULE, LIQUID FILLED ORAL at 18:59

## 2022-07-28 RX ADMIN — HYDROMORPHONE HYDROCHLORIDE 1 MG: 1 INJECTION, SOLUTION INTRAMUSCULAR; INTRAVENOUS; SUBCUTANEOUS at 18:59

## 2022-07-28 RX ADMIN — HYDROMORPHONE HYDROCHLORIDE 1 MG: 1 INJECTION, SOLUTION INTRAMUSCULAR; INTRAVENOUS; SUBCUTANEOUS at 23:16

## 2022-07-28 RX ADMIN — PANTOPRAZOLE SODIUM 40 MG: 40 TABLET, DELAYED RELEASE ORAL at 05:01

## 2022-07-28 RX ADMIN — SODIUM CHLORIDE 100 ML/HR: 9 INJECTION, SOLUTION INTRAVENOUS at 06:30

## 2022-07-28 RX ADMIN — Medication 1 PATCH: at 08:57

## 2022-07-28 RX ADMIN — FENTANYL CITRATE 25 MCG: 50 INJECTION, SOLUTION INTRAMUSCULAR; INTRAVENOUS at 03:58

## 2022-07-28 RX ADMIN — HYDROMORPHONE HYDROCHLORIDE 0.5 MG: 1 INJECTION, SOLUTION INTRAMUSCULAR; INTRAVENOUS; SUBCUTANEOUS at 10:31

## 2022-07-28 RX ADMIN — HYDROCODONE BITARTRATE AND ACETAMINOPHEN 1 TABLET: 10; 325 TABLET ORAL at 05:00

## 2022-07-28 RX ADMIN — HYDROMORPHONE HYDROCHLORIDE 1 MG: 1 INJECTION, SOLUTION INTRAMUSCULAR; INTRAVENOUS; SUBCUTANEOUS at 06:30

## 2022-07-28 RX ADMIN — NIMODIPINE 60 MG: 30 CAPSULE, LIQUID FILLED ORAL at 21:04

## 2022-07-28 RX ADMIN — Medication 10 ML: at 08:49

## 2022-07-28 RX ADMIN — HYDROMORPHONE HYDROCHLORIDE 0.5 MG: 1 INJECTION, SOLUTION INTRAMUSCULAR; INTRAVENOUS; SUBCUTANEOUS at 14:30

## 2022-07-28 RX ADMIN — NIMODIPINE 60 MG: 30 CAPSULE, LIQUID FILLED ORAL at 14:07

## 2022-07-28 RX ADMIN — FENTANYL CITRATE 25 MCG: 50 INJECTION, SOLUTION INTRAMUSCULAR; INTRAVENOUS at 01:47

## 2022-07-29 ENCOUNTER — APPOINTMENT (OUTPATIENT)
Dept: CARDIOLOGY | Facility: HOSPITAL | Age: 47
End: 2022-07-29

## 2022-07-29 LAB
ANION GAP SERPL CALCULATED.3IONS-SCNC: 10 MMOL/L (ref 5–15)
ANION GAP SERPL CALCULATED.3IONS-SCNC: 12 MMOL/L (ref 5–15)
BH CV VAS TCD LEFT DISTAL M1: 117 CM/SEC
BH CV VAS TCD LEFT MID M1: 147 CM/SEC
BH CV VAS TCD LEFT PROXIMAL M1: 103 CM/SEC
BH CV VAS TCD LEFT TERMINAL ICA: 60 CM/SEC
BH CV VAS TCD RIGHT DISTAL M1: 43 CM/SEC
BH CV VAS TCD RIGHT MID M1: 54 CM/SEC
BH CV VAS TCD RIGHT PROXIMAL M1: 126 CM/SEC
BH CV VAS TCD RIGHT TERMINAL ICA: 55 CM/SEC
BUN SERPL-MCNC: 14 MG/DL (ref 6–20)
BUN SERPL-MCNC: 16 MG/DL (ref 6–20)
BUN/CREAT SERPL: 24.2 (ref 7–25)
BUN/CREAT SERPL: 25.5 (ref 7–25)
CALCIUM SPEC-SCNC: 8.9 MG/DL (ref 8.6–10.5)
CALCIUM SPEC-SCNC: 9.3 MG/DL (ref 8.6–10.5)
CHLORIDE SERPL-SCNC: 100 MMOL/L (ref 98–107)
CHLORIDE SERPL-SCNC: 99 MMOL/L (ref 98–107)
CO2 SERPL-SCNC: 23 MMOL/L (ref 22–29)
CO2 SERPL-SCNC: 24 MMOL/L (ref 22–29)
CREAT SERPL-MCNC: 0.55 MG/DL (ref 0.57–1)
CREAT SERPL-MCNC: 0.66 MG/DL (ref 0.57–1)
EGFRCR SERPLBLD CKD-EPI 2021: 109.7 ML/MIN/1.73
EGFRCR SERPLBLD CKD-EPI 2021: 114.6 ML/MIN/1.73
GLUCOSE BLDC GLUCOMTR-MCNC: 112 MG/DL (ref 70–130)
GLUCOSE BLDC GLUCOMTR-MCNC: 146 MG/DL (ref 70–130)
GLUCOSE BLDC GLUCOMTR-MCNC: 235 MG/DL (ref 70–130)
GLUCOSE BLDC GLUCOMTR-MCNC: 259 MG/DL (ref 70–130)
GLUCOSE SERPL-MCNC: 117 MG/DL (ref 65–99)
GLUCOSE SERPL-MCNC: 154 MG/DL (ref 65–99)
MAXIMAL PREDICTED HEART RATE: 174 BPM
POTASSIUM SERPL-SCNC: 4.4 MMOL/L (ref 3.5–5.2)
POTASSIUM SERPL-SCNC: 4.9 MMOL/L (ref 3.5–5.2)
SODIUM SERPL-SCNC: 132 MMOL/L (ref 136–145)
SODIUM SERPL-SCNC: 133 MMOL/L (ref 136–145)
SODIUM SERPL-SCNC: 135 MMOL/L (ref 136–145)
STRESS TARGET HR: 148 BPM

## 2022-07-29 PROCEDURE — 99231 SBSQ HOSP IP/OBS SF/LOW 25: CPT | Performed by: NEUROLOGICAL SURGERY

## 2022-07-29 PROCEDURE — 25010000002 DEXAMETHASONE PER 1 MG: Performed by: NEUROLOGICAL SURGERY

## 2022-07-29 PROCEDURE — 84295 ASSAY OF SERUM SODIUM: CPT | Performed by: NEUROLOGICAL SURGERY

## 2022-07-29 PROCEDURE — 82962 GLUCOSE BLOOD TEST: CPT

## 2022-07-29 PROCEDURE — 93888 INTRACRANIAL LIMITED STUDY: CPT

## 2022-07-29 PROCEDURE — 99232 SBSQ HOSP IP/OBS MODERATE 35: CPT | Performed by: INTERNAL MEDICINE

## 2022-07-29 PROCEDURE — 97116 GAIT TRAINING THERAPY: CPT

## 2022-07-29 PROCEDURE — 25010000002 HYDROMORPHONE 1 MG/ML SOLUTION: Performed by: PHYSICIAN ASSISTANT

## 2022-07-29 PROCEDURE — 80048 BASIC METABOLIC PNL TOTAL CA: CPT | Performed by: INTERNAL MEDICINE

## 2022-07-29 PROCEDURE — 63710000001 INSULIN REGULAR HUMAN PER 5 UNITS: Performed by: NURSE PRACTITIONER

## 2022-07-29 RX ORDER — BISACODYL 10 MG
10 SUPPOSITORY, RECTAL RECTAL DAILY PRN
Status: DISCONTINUED | OUTPATIENT
Start: 2022-07-29 | End: 2022-07-31

## 2022-07-29 RX ORDER — SODIUM CHLORIDE 1000 MG
2 TABLET, SOLUBLE MISCELLANEOUS
Status: DISCONTINUED | OUTPATIENT
Start: 2022-07-29 | End: 2022-07-31 | Stop reason: SDUPTHER

## 2022-07-29 RX ORDER — NICOTINE POLACRILEX 4 MG
15 LOZENGE BUCCAL
Status: DISCONTINUED | OUTPATIENT
Start: 2022-07-29 | End: 2022-07-31

## 2022-07-29 RX ORDER — AMOXICILLIN 250 MG
2 CAPSULE ORAL 2 TIMES DAILY
Status: DISCONTINUED | OUTPATIENT
Start: 2022-07-29 | End: 2022-07-31

## 2022-07-29 RX ORDER — HYDRALAZINE HYDROCHLORIDE 20 MG/ML
10 INJECTION INTRAMUSCULAR; INTRAVENOUS EVERY 4 HOURS PRN
Status: DISCONTINUED | OUTPATIENT
Start: 2022-07-29 | End: 2022-08-11 | Stop reason: HOSPADM

## 2022-07-29 RX ORDER — NIMODIPINE 30 MG/1
60 CAPSULE, LIQUID FILLED ORAL
Status: DISCONTINUED | OUTPATIENT
Start: 2022-07-29 | End: 2022-08-11 | Stop reason: HOSPADM

## 2022-07-29 RX ORDER — BISACODYL 5 MG/1
5 TABLET, DELAYED RELEASE ORAL DAILY PRN
Status: DISCONTINUED | OUTPATIENT
Start: 2022-07-29 | End: 2022-07-31

## 2022-07-29 RX ORDER — INSULIN LISPRO 100 [IU]/ML
0-7 INJECTION, SOLUTION INTRAVENOUS; SUBCUTANEOUS
Status: DISCONTINUED | OUTPATIENT
Start: 2022-07-30 | End: 2022-07-31

## 2022-07-29 RX ORDER — POLYETHYLENE GLYCOL 3350 17 G/17G
17 POWDER, FOR SOLUTION ORAL DAILY PRN
Status: DISCONTINUED | OUTPATIENT
Start: 2022-07-29 | End: 2022-07-31

## 2022-07-29 RX ORDER — DEXTROSE MONOHYDRATE 25 G/50ML
25 INJECTION, SOLUTION INTRAVENOUS
Status: DISCONTINUED | OUTPATIENT
Start: 2022-07-29 | End: 2022-07-31

## 2022-07-29 RX ADMIN — HYDROMORPHONE HYDROCHLORIDE 1 MG: 1 INJECTION, SOLUTION INTRAMUSCULAR; INTRAVENOUS; SUBCUTANEOUS at 12:05

## 2022-07-29 RX ADMIN — NIMODIPINE 60 MG: 30 CAPSULE, LIQUID FILLED ORAL at 01:39

## 2022-07-29 RX ADMIN — NIMODIPINE 60 MG: 30 CAPSULE, LIQUID FILLED ORAL at 18:26

## 2022-07-29 RX ADMIN — INSULIN HUMAN 4 UNITS: 100 INJECTION, SOLUTION PARENTERAL at 12:38

## 2022-07-29 RX ADMIN — SENNOSIDES AND DOCUSATE SODIUM 2 TABLET: 50; 8.6 TABLET ORAL at 20:19

## 2022-07-29 RX ADMIN — OXYCODONE HYDROCHLORIDE AND ACETAMINOPHEN 1 TABLET: 5; 325 TABLET ORAL at 05:55

## 2022-07-29 RX ADMIN — OXYCODONE HYDROCHLORIDE AND ACETAMINOPHEN 1 TABLET: 5; 325 TABLET ORAL at 15:01

## 2022-07-29 RX ADMIN — HYDROMORPHONE HYDROCHLORIDE 1 MG: 1 INJECTION, SOLUTION INTRAMUSCULAR; INTRAVENOUS; SUBCUTANEOUS at 20:24

## 2022-07-29 RX ADMIN — SODIUM CHLORIDE 2 G: 1 TABLET ORAL at 18:26

## 2022-07-29 RX ADMIN — Medication 1 PATCH: at 08:02

## 2022-07-29 RX ADMIN — NIMODIPINE 60 MG: 30 CAPSULE, LIQUID FILLED ORAL at 09:51

## 2022-07-29 RX ADMIN — HYDROMORPHONE HYDROCHLORIDE 1 MG: 1 INJECTION, SOLUTION INTRAMUSCULAR; INTRAVENOUS; SUBCUTANEOUS at 16:03

## 2022-07-29 RX ADMIN — Medication 10 ML: at 20:18

## 2022-07-29 RX ADMIN — Medication 10 ML: at 08:01

## 2022-07-29 RX ADMIN — DEXAMETHASONE SODIUM PHOSPHATE 4 MG: 4 INJECTION INTRA-ARTICULAR; INTRALESIONAL; INTRAMUSCULAR; INTRAVENOUS; SOFT TISSUE at 05:53

## 2022-07-29 RX ADMIN — NIMODIPINE 60 MG: 30 CAPSULE, LIQUID FILLED ORAL at 13:24

## 2022-07-29 RX ADMIN — VASOPRESSIN 40 ML/HR: 20 INJECTION, SOLUTION INTRAVENOUS at 15:52

## 2022-07-29 RX ADMIN — NIMODIPINE 60 MG: 30 CAPSULE, LIQUID FILLED ORAL at 22:01

## 2022-07-29 RX ADMIN — SODIUM CHLORIDE 100 ML/HR: 9 INJECTION, SOLUTION INTRAVENOUS at 03:32

## 2022-07-29 RX ADMIN — SODIUM CHLORIDE 2 G: 1 TABLET ORAL at 09:51

## 2022-07-29 RX ADMIN — INSULIN HUMAN 3 UNITS: 100 INJECTION, SOLUTION PARENTERAL at 18:26

## 2022-07-29 RX ADMIN — HYDROMORPHONE HYDROCHLORIDE 1 MG: 1 INJECTION, SOLUTION INTRAMUSCULAR; INTRAVENOUS; SUBCUTANEOUS at 08:01

## 2022-07-29 RX ADMIN — DEXAMETHASONE SODIUM PHOSPHATE 4 MG: 4 INJECTION INTRA-ARTICULAR; INTRALESIONAL; INTRAMUSCULAR; INTRAVENOUS; SOFT TISSUE at 12:05

## 2022-07-29 RX ADMIN — LISINOPRIL 5 MG: 5 TABLET ORAL at 08:01

## 2022-07-29 RX ADMIN — OXYCODONE HYDROCHLORIDE AND ACETAMINOPHEN 1 TABLET: 5; 325 TABLET ORAL at 22:01

## 2022-07-29 RX ADMIN — SODIUM CHLORIDE 2 G: 1 TABLET ORAL at 12:06

## 2022-07-29 RX ADMIN — OXYCODONE HYDROCHLORIDE AND ACETAMINOPHEN 1 TABLET: 5; 325 TABLET ORAL at 09:51

## 2022-07-29 RX ADMIN — DEXAMETHASONE SODIUM PHOSPHATE 4 MG: 4 INJECTION INTRA-ARTICULAR; INTRALESIONAL; INTRAMUSCULAR; INTRAVENOUS; SOFT TISSUE at 18:26

## 2022-07-29 RX ADMIN — HYDROMORPHONE HYDROCHLORIDE 1 MG: 1 INJECTION, SOLUTION INTRAMUSCULAR; INTRAVENOUS; SUBCUTANEOUS at 03:32

## 2022-07-29 RX ADMIN — SENNOSIDES AND DOCUSATE SODIUM 2 TABLET: 50; 8.6 TABLET ORAL at 09:51

## 2022-07-29 RX ADMIN — ENALAPRILAT 1.25 MG: 1.25 INJECTION INTRAVENOUS at 21:12

## 2022-07-29 RX ADMIN — PANTOPRAZOLE SODIUM 40 MG: 40 TABLET, DELAYED RELEASE ORAL at 05:54

## 2022-07-29 RX ADMIN — OXYCODONE HYDROCHLORIDE AND ACETAMINOPHEN 1 TABLET: 5; 325 TABLET ORAL at 01:39

## 2022-07-30 ENCOUNTER — APPOINTMENT (OUTPATIENT)
Dept: GENERAL RADIOLOGY | Facility: HOSPITAL | Age: 47
End: 2022-07-30

## 2022-07-30 ENCOUNTER — APPOINTMENT (OUTPATIENT)
Dept: CT IMAGING | Facility: HOSPITAL | Age: 47
End: 2022-07-30

## 2022-07-30 ENCOUNTER — APPOINTMENT (OUTPATIENT)
Dept: CARDIOLOGY | Facility: HOSPITAL | Age: 47
End: 2022-07-30

## 2022-07-30 LAB
ANION GAP SERPL CALCULATED.3IONS-SCNC: 11 MMOL/L (ref 5–15)
ANION GAP SERPL CALCULATED.3IONS-SCNC: 8 MMOL/L (ref 5–15)
BH CV VAS TCD LEFT DISTAL M1: 148 CM/SEC
BH CV VAS TCD LEFT MID M1: 162 CM/SEC
BH CV VAS TCD LEFT PROXIMAL M1: 156 CM/SEC
BH CV VAS TCD LEFT TERMINAL ICA: 78 CM/SEC
BH CV VAS TCD RIGHT DISTAL M1: 48 CM/SEC
BH CV VAS TCD RIGHT MID M1: 79 CM/SEC
BH CV VAS TCD RIGHT PROXIMAL M1: 123 CM/SEC
BH CV VAS TCD RIGHT TERMINAL ICA: 68 CM/SEC
BUN SERPL-MCNC: 16 MG/DL (ref 6–20)
BUN SERPL-MCNC: 20 MG/DL (ref 6–20)
BUN/CREAT SERPL: 22.5 (ref 7–25)
BUN/CREAT SERPL: 39.2 (ref 7–25)
CALCIUM SPEC-SCNC: 8.5 MG/DL (ref 8.6–10.5)
CALCIUM SPEC-SCNC: 8.6 MG/DL (ref 8.6–10.5)
CHLORIDE SERPL-SCNC: 100 MMOL/L (ref 98–107)
CHLORIDE SERPL-SCNC: 102 MMOL/L (ref 98–107)
CO2 SERPL-SCNC: 24 MMOL/L (ref 22–29)
CO2 SERPL-SCNC: 25 MMOL/L (ref 22–29)
CREAT SERPL-MCNC: 0.51 MG/DL (ref 0.57–1)
CREAT SERPL-MCNC: 0.71 MG/DL (ref 0.57–1)
EGFRCR SERPLBLD CKD-EPI 2021: 106.3 ML/MIN/1.73
EGFRCR SERPLBLD CKD-EPI 2021: 116.8 ML/MIN/1.73
GLUCOSE BLDC GLUCOMTR-MCNC: 128 MG/DL (ref 70–130)
GLUCOSE BLDC GLUCOMTR-MCNC: 142 MG/DL (ref 70–130)
GLUCOSE BLDC GLUCOMTR-MCNC: 198 MG/DL (ref 70–130)
GLUCOSE BLDC GLUCOMTR-MCNC: 270 MG/DL (ref 70–130)
GLUCOSE SERPL-MCNC: 188 MG/DL (ref 65–99)
GLUCOSE SERPL-MCNC: 188 MG/DL (ref 65–99)
MAGNESIUM SERPL-MCNC: 1.7 MG/DL (ref 1.6–2.6)
MAXIMAL PREDICTED HEART RATE: 174 BPM
POTASSIUM SERPL-SCNC: 4.2 MMOL/L (ref 3.5–5.2)
POTASSIUM SERPL-SCNC: 4.3 MMOL/L (ref 3.5–5.2)
SODIUM SERPL-SCNC: 132 MMOL/L (ref 136–145)
SODIUM SERPL-SCNC: 133 MMOL/L (ref 136–145)
SODIUM SERPL-SCNC: 133 MMOL/L (ref 136–145)
SODIUM SERPL-SCNC: 136 MMOL/L (ref 136–145)
SODIUM SERPL-SCNC: 137 MMOL/L (ref 136–145)
STRESS TARGET HR: 148 BPM

## 2022-07-30 PROCEDURE — 0 IOPAMIDOL PER 1 ML: Performed by: INTERNAL MEDICINE

## 2022-07-30 PROCEDURE — 25010000002 HYDROMORPHONE 1 MG/ML SOLUTION: Performed by: PHYSICIAN ASSISTANT

## 2022-07-30 PROCEDURE — 80048 BASIC METABOLIC PNL TOTAL CA: CPT | Performed by: INTERNAL MEDICINE

## 2022-07-30 PROCEDURE — 83735 ASSAY OF MAGNESIUM: CPT | Performed by: INTERNAL MEDICINE

## 2022-07-30 PROCEDURE — 25010000002 DEXAMETHASONE PER 1 MG: Performed by: NEUROLOGICAL SURGERY

## 2022-07-30 PROCEDURE — 84295 ASSAY OF SERUM SODIUM: CPT | Performed by: NEUROLOGICAL SURGERY

## 2022-07-30 PROCEDURE — 25010000002 HYDRALAZINE PER 20 MG: Performed by: NURSE PRACTITIONER

## 2022-07-30 PROCEDURE — 99232 SBSQ HOSP IP/OBS MODERATE 35: CPT | Performed by: NEUROLOGICAL SURGERY

## 2022-07-30 PROCEDURE — 70496 CT ANGIOGRAPHY HEAD: CPT

## 2022-07-30 PROCEDURE — 93888 INTRACRANIAL LIMITED STUDY: CPT

## 2022-07-30 PROCEDURE — 71045 X-RAY EXAM CHEST 1 VIEW: CPT

## 2022-07-30 PROCEDURE — 03VG3HZ RESTRICTION OF INTRACRANIAL ARTERY WITH INTRALUMINAL DEVICE, FLOW DIVERTER, PERCUTANEOUS APPROACH: ICD-10-PCS | Performed by: NEUROLOGICAL SURGERY

## 2022-07-30 PROCEDURE — C1894 INTRO/SHEATH, NON-LASER: HCPCS

## 2022-07-30 PROCEDURE — C1751 CATH, INF, PER/CENT/MIDLINE: HCPCS

## 2022-07-30 PROCEDURE — 05HY33Z INSERTION OF INFUSION DEVICE INTO UPPER VEIN, PERCUTANEOUS APPROACH: ICD-10-PCS | Performed by: NEUROLOGICAL SURGERY

## 2022-07-30 PROCEDURE — 99232 SBSQ HOSP IP/OBS MODERATE 35: CPT | Performed by: INTERNAL MEDICINE

## 2022-07-30 PROCEDURE — B31DYZZ FLUOROSCOPY OF RIGHT VERTEBRAL ARTERY USING OTHER CONTRAST: ICD-10-PCS | Performed by: NEUROLOGICAL SURGERY

## 2022-07-30 PROCEDURE — 70450 CT HEAD/BRAIN W/O DYE: CPT

## 2022-07-30 PROCEDURE — B318YZZ FLUOROSCOPY OF BILATERAL INTERNAL CAROTID ARTERIES USING OTHER CONTRAST: ICD-10-PCS | Performed by: NEUROLOGICAL SURGERY

## 2022-07-30 PROCEDURE — 82962 GLUCOSE BLOOD TEST: CPT

## 2022-07-30 PROCEDURE — 63710000001 INSULIN LISPRO (HUMAN) PER 5 UNITS: Performed by: NURSE PRACTITIONER

## 2022-07-30 RX ORDER — SODIUM CHLORIDE 0.9 % (FLUSH) 0.9 %
10 SYRINGE (ML) INJECTION AS NEEDED
Status: DISCONTINUED | OUTPATIENT
Start: 2022-07-30 | End: 2022-07-31

## 2022-07-30 RX ORDER — MAGNESIUM SULFATE HEPTAHYDRATE 40 MG/ML
4 INJECTION, SOLUTION INTRAVENOUS AS NEEDED
Status: DISCONTINUED | OUTPATIENT
Start: 2022-07-30 | End: 2022-07-31

## 2022-07-30 RX ORDER — SODIUM CHLORIDE 9 MG/ML
25 INJECTION, SOLUTION INTRAVENOUS CONTINUOUS
Status: DISCONTINUED | OUTPATIENT
Start: 2022-07-31 | End: 2022-08-04

## 2022-07-30 RX ORDER — LEVETIRACETAM 500 MG/1
500 TABLET ORAL 2 TIMES DAILY
Status: DISCONTINUED | OUTPATIENT
Start: 2022-07-30 | End: 2022-08-11 | Stop reason: HOSPADM

## 2022-07-30 RX ORDER — 3% SODIUM CHLORIDE 3 G/100ML
50 INJECTION, SOLUTION INTRAVENOUS CONTINUOUS
Status: DISPENSED | OUTPATIENT
Start: 2022-07-30 | End: 2022-07-31

## 2022-07-30 RX ORDER — MAGNESIUM SULFATE HEPTAHYDRATE 40 MG/ML
2 INJECTION, SOLUTION INTRAVENOUS AS NEEDED
Status: DISCONTINUED | OUTPATIENT
Start: 2022-07-30 | End: 2022-07-31

## 2022-07-30 RX ORDER — OXYCODONE AND ACETAMINOPHEN 7.5; 325 MG/1; MG/1
1 TABLET ORAL EVERY 4 HOURS PRN
Status: DISCONTINUED | OUTPATIENT
Start: 2022-07-30 | End: 2022-07-31

## 2022-07-30 RX ORDER — SODIUM CHLORIDE 0.9 % (FLUSH) 0.9 %
10 SYRINGE (ML) INJECTION EVERY 12 HOURS SCHEDULED
Status: DISCONTINUED | OUTPATIENT
Start: 2022-07-30 | End: 2022-07-31

## 2022-07-30 RX ADMIN — OXYCODONE HYDROCHLORIDE AND ACETAMINOPHEN 1 TABLET: 7.5; 325 TABLET ORAL at 11:38

## 2022-07-30 RX ADMIN — SODIUM CHLORIDE 2 G: 1 TABLET ORAL at 08:42

## 2022-07-30 RX ADMIN — Medication 10 ML: at 20:06

## 2022-07-30 RX ADMIN — DEXAMETHASONE SODIUM PHOSPHATE 4 MG: 4 INJECTION INTRA-ARTICULAR; INTRALESIONAL; INTRAMUSCULAR; INTRAVENOUS; SOFT TISSUE at 17:21

## 2022-07-30 RX ADMIN — LEVETIRACETAM 500 MG: 500 TABLET, FILM COATED ORAL at 20:05

## 2022-07-30 RX ADMIN — SODIUM CHLORIDE 2 G: 1 TABLET ORAL at 11:38

## 2022-07-30 RX ADMIN — Medication 1 PATCH: at 08:44

## 2022-07-30 RX ADMIN — INSULIN LISPRO 4 UNITS: 100 INJECTION, SOLUTION INTRAVENOUS; SUBCUTANEOUS at 17:21

## 2022-07-30 RX ADMIN — SODIUM CHLORIDE 50 ML/HR: 9 INJECTION, SOLUTION INTRAVENOUS at 07:04

## 2022-07-30 RX ADMIN — SODIUM CHLORIDE 2 G: 1 TABLET ORAL at 17:21

## 2022-07-30 RX ADMIN — DEXAMETHASONE SODIUM PHOSPHATE 4 MG: 4 INJECTION INTRA-ARTICULAR; INTRALESIONAL; INTRAMUSCULAR; INTRAVENOUS; SOFT TISSUE at 00:11

## 2022-07-30 RX ADMIN — Medication 10 ML: at 08:42

## 2022-07-30 RX ADMIN — NIMODIPINE 60 MG: 30 CAPSULE, LIQUID FILLED ORAL at 10:17

## 2022-07-30 RX ADMIN — LISINOPRIL 5 MG: 5 TABLET ORAL at 08:42

## 2022-07-30 RX ADMIN — HYDROMORPHONE HYDROCHLORIDE 1 MG: 1 INJECTION, SOLUTION INTRAMUSCULAR; INTRAVENOUS; SUBCUTANEOUS at 00:22

## 2022-07-30 RX ADMIN — Medication 10 ML: at 08:43

## 2022-07-30 RX ADMIN — NIMODIPINE 60 MG: 30 CAPSULE, LIQUID FILLED ORAL at 05:47

## 2022-07-30 RX ADMIN — NIMODIPINE 60 MG: 30 CAPSULE, LIQUID FILLED ORAL at 17:21

## 2022-07-30 RX ADMIN — IOPAMIDOL 75 ML: 755 INJECTION, SOLUTION INTRAVENOUS at 18:03

## 2022-07-30 RX ADMIN — OXYCODONE HYDROCHLORIDE AND ACETAMINOPHEN 1 TABLET: 5; 325 TABLET ORAL at 02:23

## 2022-07-30 RX ADMIN — SODIUM CHLORIDE 40 ML/HR: 9 INJECTION, SOLUTION INTRAVENOUS at 23:41

## 2022-07-30 RX ADMIN — PANTOPRAZOLE SODIUM 40 MG: 40 TABLET, DELAYED RELEASE ORAL at 05:48

## 2022-07-30 RX ADMIN — HYDROMORPHONE HYDROCHLORIDE 1 MG: 1 INJECTION, SOLUTION INTRAMUSCULAR; INTRAVENOUS; SUBCUTANEOUS at 14:10

## 2022-07-30 RX ADMIN — SENNOSIDES AND DOCUSATE SODIUM 2 TABLET: 50; 8.6 TABLET ORAL at 08:42

## 2022-07-30 RX ADMIN — OXYCODONE HYDROCHLORIDE AND ACETAMINOPHEN 1 TABLET: 7.5; 325 TABLET ORAL at 17:21

## 2022-07-30 RX ADMIN — DEXAMETHASONE SODIUM PHOSPHATE 4 MG: 4 INJECTION INTRA-ARTICULAR; INTRALESIONAL; INTRAMUSCULAR; INTRAVENOUS; SOFT TISSUE at 05:48

## 2022-07-30 RX ADMIN — DEXAMETHASONE SODIUM PHOSPHATE 4 MG: 4 INJECTION INTRA-ARTICULAR; INTRALESIONAL; INTRAMUSCULAR; INTRAVENOUS; SOFT TISSUE at 11:38

## 2022-07-30 RX ADMIN — NIMODIPINE 60 MG: 30 CAPSULE, LIQUID FILLED ORAL at 22:16

## 2022-07-30 RX ADMIN — NIMODIPINE 60 MG: 30 CAPSULE, LIQUID FILLED ORAL at 02:22

## 2022-07-30 RX ADMIN — HYDRALAZINE HYDROCHLORIDE 10 MG: 20 INJECTION INTRAMUSCULAR; INTRAVENOUS at 00:40

## 2022-07-30 RX ADMIN — Medication 10 ML: at 20:05

## 2022-07-30 RX ADMIN — VASOPRESSIN 50 ML/HR: 20 INJECTION, SOLUTION INTRAVENOUS at 03:04

## 2022-07-30 RX ADMIN — OXYCODONE HYDROCHLORIDE AND ACETAMINOPHEN 1 TABLET: 5; 325 TABLET ORAL at 07:08

## 2022-07-30 RX ADMIN — SODIUM CHLORIDE 60 ML/HR: 3 INJECTION, SOLUTION INTRAVENOUS at 21:40

## 2022-07-30 RX ADMIN — NIMODIPINE 60 MG: 30 CAPSULE, LIQUID FILLED ORAL at 14:10

## 2022-07-30 RX ADMIN — HYDROMORPHONE HYDROCHLORIDE 1 MG: 1 INJECTION, SOLUTION INTRAMUSCULAR; INTRAVENOUS; SUBCUTANEOUS at 04:45

## 2022-07-30 RX ADMIN — HYDROMORPHONE HYDROCHLORIDE 1 MG: 1 INJECTION, SOLUTION INTRAMUSCULAR; INTRAVENOUS; SUBCUTANEOUS at 08:42

## 2022-07-30 RX ADMIN — SODIUM CHLORIDE 50 ML/HR: 3 INJECTION, SOLUTION INTRAVENOUS at 12:11

## 2022-07-30 RX ADMIN — SENNOSIDES AND DOCUSATE SODIUM 2 TABLET: 50; 8.6 TABLET ORAL at 20:05

## 2022-07-30 RX ADMIN — OXYCODONE HYDROCHLORIDE AND ACETAMINOPHEN 1 TABLET: 7.5; 325 TABLET ORAL at 22:16

## 2022-07-31 ENCOUNTER — ANESTHESIA EVENT (OUTPATIENT)
Dept: CARDIOLOGY | Facility: HOSPITAL | Age: 47
End: 2022-07-31

## 2022-07-31 ENCOUNTER — ANESTHESIA EVENT CONVERTED (OUTPATIENT)
Dept: ANESTHESIOLOGY | Facility: HOSPITAL | Age: 47
End: 2022-07-31

## 2022-07-31 ENCOUNTER — APPOINTMENT (OUTPATIENT)
Dept: CARDIOLOGY | Facility: HOSPITAL | Age: 47
End: 2022-07-31

## 2022-07-31 ENCOUNTER — ANESTHESIA (OUTPATIENT)
Dept: CARDIOLOGY | Facility: HOSPITAL | Age: 47
End: 2022-07-31

## 2022-07-31 PROBLEM — I10 ESSENTIAL HYPERTENSION: Chronic | Status: ACTIVE | Noted: 2022-07-25

## 2022-07-31 PROBLEM — E66.9 CLASS 1 OBESITY IN ADULT: Chronic | Status: ACTIVE | Noted: 2022-07-31

## 2022-07-31 PROBLEM — F17.200 SMOKER: Status: ACTIVE | Noted: 2022-07-31

## 2022-07-31 PROBLEM — F17.200 SMOKER: Chronic | Status: ACTIVE | Noted: 2022-07-31

## 2022-07-31 PROBLEM — F19.90 ILLICIT DRUG USE: Status: ACTIVE | Noted: 2022-07-31

## 2022-07-31 PROBLEM — E66.9 CLASS 1 OBESITY IN ADULT: Status: ACTIVE | Noted: 2022-07-31

## 2022-07-31 PROBLEM — D50.0 IRON DEFICIENCY ANEMIA DUE TO CHRONIC BLOOD LOSS: Chronic | Status: ACTIVE | Noted: 2017-07-26

## 2022-07-31 LAB
ANION GAP SERPL CALCULATED.3IONS-SCNC: 7 MMOL/L (ref 5–15)
BUN SERPL-MCNC: 16 MG/DL (ref 6–20)
BUN/CREAT SERPL: 33.3 (ref 7–25)
CALCIUM SPEC-SCNC: 8.1 MG/DL (ref 8.6–10.5)
CHLORIDE SERPL-SCNC: 103 MMOL/L (ref 98–107)
CO2 SERPL-SCNC: 28 MMOL/L (ref 22–29)
CREAT SERPL-MCNC: 0.48 MG/DL (ref 0.57–1)
EGFRCR SERPLBLD CKD-EPI 2021: 118.5 ML/MIN/1.73
GLUCOSE BLDC GLUCOMTR-MCNC: 118 MG/DL (ref 70–130)
GLUCOSE BLDC GLUCOMTR-MCNC: 119 MG/DL (ref 70–130)
GLUCOSE BLDC GLUCOMTR-MCNC: 124 MG/DL (ref 70–130)
GLUCOSE BLDC GLUCOMTR-MCNC: 138 MG/DL (ref 70–130)
GLUCOSE BLDC GLUCOMTR-MCNC: 244 MG/DL (ref 70–130)
GLUCOSE SERPL-MCNC: 133 MG/DL (ref 65–99)
MAGNESIUM SERPL-MCNC: 1.9 MG/DL (ref 1.6–2.6)
MAGNESIUM SERPL-MCNC: 2.7 MG/DL (ref 1.6–2.6)
POTASSIUM SERPL-SCNC: 4.3 MMOL/L (ref 3.5–5.2)
SODIUM SERPL-SCNC: 136 MMOL/L (ref 136–145)
SODIUM SERPL-SCNC: 137 MMOL/L (ref 136–145)
SODIUM SERPL-SCNC: 138 MMOL/L (ref 136–145)

## 2022-07-31 PROCEDURE — 36224 PLACE CATH CAROTD ART: CPT | Performed by: NEUROLOGICAL SURGERY

## 2022-07-31 PROCEDURE — 61624 TCAT PERM OCCLS/EMBOLJ CNS: CPT | Performed by: NEUROLOGICAL SURGERY

## 2022-07-31 PROCEDURE — C1894 INTRO/SHEATH, NON-LASER: HCPCS | Performed by: NEUROLOGICAL SURGERY

## 2022-07-31 PROCEDURE — 80048 BASIC METABOLIC PNL TOTAL CA: CPT | Performed by: INTERNAL MEDICINE

## 2022-07-31 PROCEDURE — C1887 CATHETER, GUIDING: HCPCS | Performed by: NEUROLOGICAL SURGERY

## 2022-07-31 PROCEDURE — 0 MAGNESIUM SULFATE 4 GM/100ML SOLUTION: Performed by: NURSE PRACTITIONER

## 2022-07-31 PROCEDURE — 83735 ASSAY OF MAGNESIUM: CPT | Performed by: NURSE PRACTITIONER

## 2022-07-31 PROCEDURE — C1876 STENT, NON-COA/NON-COV W/DEL: HCPCS | Performed by: NEUROLOGICAL SURGERY

## 2022-07-31 PROCEDURE — 93888 INTRACRANIAL LIMITED STUDY: CPT

## 2022-07-31 PROCEDURE — 99233 SBSQ HOSP IP/OBS HIGH 50: CPT | Performed by: NURSE PRACTITIONER

## 2022-07-31 PROCEDURE — 25010000002 ONDANSETRON PER 1 MG

## 2022-07-31 PROCEDURE — 25010000002 DEXAMETHASONE PER 1 MG: Performed by: NEUROLOGICAL SURGERY

## 2022-07-31 PROCEDURE — 25010000002 PROPOFOL 10 MG/ML EMULSION

## 2022-07-31 PROCEDURE — 25010000002 HEPARIN (PORCINE) PER 1000 UNITS

## 2022-07-31 PROCEDURE — 25010000002 DEXAMETHASONE PER 1 MG: Performed by: PHYSICIAN ASSISTANT

## 2022-07-31 PROCEDURE — 36226 PLACE CATH VERTEBRAL ART: CPT | Performed by: NEUROLOGICAL SURGERY

## 2022-07-31 PROCEDURE — C1760 CLOSURE DEV, VASC: HCPCS | Performed by: NEUROLOGICAL SURGERY

## 2022-07-31 PROCEDURE — C1766 INTRO/SHEATH,STRBLE,NON-PEEL: HCPCS | Performed by: NEUROLOGICAL SURGERY

## 2022-07-31 PROCEDURE — 75894 X-RAYS TRANSCATH THERAPY: CPT | Performed by: NEUROLOGICAL SURGERY

## 2022-07-31 PROCEDURE — 0 IODIXANOL PER 1 ML: Performed by: NEUROLOGICAL SURGERY

## 2022-07-31 PROCEDURE — C1769 GUIDE WIRE: HCPCS | Performed by: NEUROLOGICAL SURGERY

## 2022-07-31 PROCEDURE — 82962 GLUCOSE BLOOD TEST: CPT

## 2022-07-31 PROCEDURE — 75898 FOLLOW-UP ANGIOGRAPHY: CPT | Performed by: NEUROLOGICAL SURGERY

## 2022-07-31 PROCEDURE — 25010000002 HYDROMORPHONE 1 MG/ML SOLUTION: Performed by: NEUROLOGICAL SURGERY

## 2022-07-31 PROCEDURE — 83735 ASSAY OF MAGNESIUM: CPT | Performed by: NEUROLOGICAL SURGERY

## 2022-07-31 PROCEDURE — S0260 H&P FOR SURGERY: HCPCS | Performed by: NEUROLOGICAL SURGERY

## 2022-07-31 PROCEDURE — 25010000002 DEXAMETHASONE PER 1 MG

## 2022-07-31 PROCEDURE — 25010000002 EPTIFIBATIDE 20 MG/10ML SOLUTION: Performed by: NEUROLOGICAL SURGERY

## 2022-07-31 PROCEDURE — 84295 ASSAY OF SERUM SODIUM: CPT | Performed by: NEUROLOGICAL SURGERY

## 2022-07-31 PROCEDURE — 76377 3D RENDER W/INTRP POSTPROCES: CPT | Performed by: NEUROLOGICAL SURGERY

## 2022-07-31 PROCEDURE — 36217 PLACE CATHETER IN ARTERY: CPT | Performed by: NEUROLOGICAL SURGERY

## 2022-07-31 PROCEDURE — 25010000002 HYDROMORPHONE 1 MG/ML SOLUTION: Performed by: PHYSICIAN ASSISTANT

## 2022-07-31 DEVICE — IMPLANTABLE DEVICE: Type: IMPLANTABLE DEVICE | Status: FUNCTIONAL

## 2022-07-31 RX ORDER — INSULIN LISPRO 100 [IU]/ML
0-7 INJECTION, SOLUTION INTRAVENOUS; SUBCUTANEOUS
Status: DISCONTINUED | OUTPATIENT
Start: 2022-07-31 | End: 2022-08-10

## 2022-07-31 RX ORDER — DEXAMETHASONE SODIUM PHOSPHATE 4 MG/ML
4 INJECTION, SOLUTION INTRA-ARTICULAR; INTRALESIONAL; INTRAMUSCULAR; INTRAVENOUS; SOFT TISSUE EVERY 6 HOURS
Status: DISCONTINUED | OUTPATIENT
Start: 2022-07-31 | End: 2022-08-05

## 2022-07-31 RX ORDER — SODIUM CHLORIDE, SODIUM LACTATE, POTASSIUM CHLORIDE, CALCIUM CHLORIDE 600; 310; 30; 20 MG/100ML; MG/100ML; MG/100ML; MG/100ML
INJECTION, SOLUTION INTRAVENOUS CONTINUOUS PRN
Status: DISCONTINUED | OUTPATIENT
Start: 2022-07-31 | End: 2022-07-31 | Stop reason: SURG

## 2022-07-31 RX ORDER — PANTOPRAZOLE SODIUM 40 MG/10ML
40 INJECTION, POWDER, LYOPHILIZED, FOR SOLUTION INTRAVENOUS
Status: DISCONTINUED | OUTPATIENT
Start: 2022-08-01 | End: 2022-08-02

## 2022-07-31 RX ORDER — IODIXANOL 320 MG/ML
INJECTION, SOLUTION INTRAVASCULAR AS NEEDED
Status: DISCONTINUED | OUTPATIENT
Start: 2022-07-31 | End: 2022-07-31 | Stop reason: HOSPADM

## 2022-07-31 RX ORDER — SODIUM CHLORIDE 1000 MG
2 TABLET, SOLUBLE MISCELLANEOUS
Status: DISCONTINUED | OUTPATIENT
Start: 2022-07-31 | End: 2022-08-11 | Stop reason: HOSPADM

## 2022-07-31 RX ORDER — SODIUM CHLORIDE 0.9 % (FLUSH) 0.9 %
3-10 SYRINGE (ML) INJECTION AS NEEDED
Status: DISCONTINUED | OUTPATIENT
Start: 2022-07-31 | End: 2022-07-31 | Stop reason: HOSPADM

## 2022-07-31 RX ORDER — MAGNESIUM SULFATE HEPTAHYDRATE 40 MG/ML
2 INJECTION, SOLUTION INTRAVENOUS AS NEEDED
Status: DISCONTINUED | OUTPATIENT
Start: 2022-07-31 | End: 2022-08-06 | Stop reason: SDUPTHER

## 2022-07-31 RX ORDER — OXYCODONE AND ACETAMINOPHEN 7.5; 325 MG/1; MG/1
1 TABLET ORAL EVERY 4 HOURS PRN
Status: DISPENSED | OUTPATIENT
Start: 2022-07-31 | End: 2022-08-07

## 2022-07-31 RX ORDER — ACETAMINOPHEN 325 MG/1
650 TABLET ORAL EVERY 4 HOURS PRN
Status: DISCONTINUED | OUTPATIENT
Start: 2022-07-31 | End: 2022-08-11 | Stop reason: HOSPADM

## 2022-07-31 RX ORDER — HEPARIN SODIUM 1000 [USP'U]/ML
INJECTION, SOLUTION INTRAVENOUS; SUBCUTANEOUS AS NEEDED
Status: DISCONTINUED | OUTPATIENT
Start: 2022-07-31 | End: 2022-07-31 | Stop reason: SURG

## 2022-07-31 RX ORDER — ASPIRIN 300 MG/1
SUPPOSITORY RECTAL AS NEEDED
Status: DISCONTINUED | OUTPATIENT
Start: 2022-07-31 | End: 2022-07-31 | Stop reason: HOSPADM

## 2022-07-31 RX ORDER — ONDANSETRON 2 MG/ML
INJECTION INTRAMUSCULAR; INTRAVENOUS AS NEEDED
Status: DISCONTINUED | OUTPATIENT
Start: 2022-07-31 | End: 2022-07-31 | Stop reason: SURG

## 2022-07-31 RX ORDER — NICOTINE 21 MG/24HR
1 PATCH, TRANSDERMAL 24 HOURS TRANSDERMAL
Status: DISCONTINUED | OUTPATIENT
Start: 2022-08-01 | End: 2022-08-11 | Stop reason: HOSPADM

## 2022-07-31 RX ORDER — 3% SODIUM CHLORIDE 3 G/100ML
50 INJECTION, SOLUTION INTRAVENOUS CONTINUOUS
Status: DISPENSED | OUTPATIENT
Start: 2022-07-31 | End: 2022-08-01

## 2022-07-31 RX ORDER — ACETAMINOPHEN 325 MG/1
325 TABLET ORAL DAILY
Status: DISCONTINUED | OUTPATIENT
Start: 2022-08-01 | End: 2022-08-11 | Stop reason: HOSPADM

## 2022-07-31 RX ORDER — IPRATROPIUM BROMIDE AND ALBUTEROL SULFATE 2.5; .5 MG/3ML; MG/3ML
3 SOLUTION RESPIRATORY (INHALATION) ONCE AS NEEDED
Status: DISCONTINUED | OUTPATIENT
Start: 2022-07-31 | End: 2022-07-31 | Stop reason: HOSPADM

## 2022-07-31 RX ORDER — NICOTINE POLACRILEX 4 MG
15 LOZENGE BUCCAL
Status: DISCONTINUED | OUTPATIENT
Start: 2022-07-31 | End: 2022-08-11 | Stop reason: HOSPADM

## 2022-07-31 RX ORDER — NALOXONE HCL 0.4 MG/ML
0.4 VIAL (ML) INJECTION AS NEEDED
Status: DISCONTINUED | OUTPATIENT
Start: 2022-07-31 | End: 2022-07-31 | Stop reason: HOSPADM

## 2022-07-31 RX ORDER — BISACODYL 10 MG
10 SUPPOSITORY, RECTAL RECTAL DAILY PRN
Status: DISCONTINUED | OUTPATIENT
Start: 2022-07-31 | End: 2022-08-01

## 2022-07-31 RX ORDER — POLYETHYLENE GLYCOL 3350 17 G/17G
17 POWDER, FOR SOLUTION ORAL DAILY PRN
Status: DISCONTINUED | OUTPATIENT
Start: 2022-07-31 | End: 2022-08-01

## 2022-07-31 RX ORDER — ASPIRIN 81 MG/1
81 TABLET, CHEWABLE ORAL DAILY
Status: DISCONTINUED | OUTPATIENT
Start: 2022-07-31 | End: 2022-08-11 | Stop reason: HOSPADM

## 2022-07-31 RX ORDER — AMOXICILLIN 250 MG
2 CAPSULE ORAL 2 TIMES DAILY
Status: DISCONTINUED | OUTPATIENT
Start: 2022-07-31 | End: 2022-08-01

## 2022-07-31 RX ORDER — ONDANSETRON 2 MG/ML
4 INJECTION INTRAMUSCULAR; INTRAVENOUS EVERY 6 HOURS PRN
Status: DISCONTINUED | OUTPATIENT
Start: 2022-07-31 | End: 2022-08-11 | Stop reason: HOSPADM

## 2022-07-31 RX ORDER — ONDANSETRON 2 MG/ML
4 INJECTION INTRAMUSCULAR; INTRAVENOUS ONCE AS NEEDED
Status: DISCONTINUED | OUTPATIENT
Start: 2022-07-31 | End: 2022-07-31 | Stop reason: HOSPADM

## 2022-07-31 RX ORDER — DEXTROSE MONOHYDRATE 25 G/50ML
25 INJECTION, SOLUTION INTRAVENOUS
Status: DISCONTINUED | OUTPATIENT
Start: 2022-07-31 | End: 2022-08-11 | Stop reason: HOSPADM

## 2022-07-31 RX ORDER — SODIUM CHLORIDE 0.9 % (FLUSH) 0.9 %
3 SYRINGE (ML) INJECTION EVERY 12 HOURS SCHEDULED
Status: DISCONTINUED | OUTPATIENT
Start: 2022-07-31 | End: 2022-07-31 | Stop reason: HOSPADM

## 2022-07-31 RX ORDER — ROCURONIUM BROMIDE 10 MG/ML
INJECTION, SOLUTION INTRAVENOUS AS NEEDED
Status: DISCONTINUED | OUTPATIENT
Start: 2022-07-31 | End: 2022-07-31 | Stop reason: SURG

## 2022-07-31 RX ORDER — BISACODYL 5 MG/1
5 TABLET, DELAYED RELEASE ORAL DAILY PRN
Status: DISCONTINUED | OUTPATIENT
Start: 2022-07-31 | End: 2022-08-01

## 2022-07-31 RX ORDER — PROPOFOL 10 MG/ML
VIAL (ML) INTRAVENOUS AS NEEDED
Status: DISCONTINUED | OUTPATIENT
Start: 2022-07-31 | End: 2022-07-31 | Stop reason: SURG

## 2022-07-31 RX ORDER — LIDOCAINE HYDROCHLORIDE 10 MG/ML
INJECTION, SOLUTION EPIDURAL; INFILTRATION; INTRACAUDAL; PERINEURAL AS NEEDED
Status: DISCONTINUED | OUTPATIENT
Start: 2022-07-31 | End: 2022-07-31 | Stop reason: SURG

## 2022-07-31 RX ORDER — LABETALOL HYDROCHLORIDE 5 MG/ML
5 INJECTION, SOLUTION INTRAVENOUS
Status: DISCONTINUED | OUTPATIENT
Start: 2022-07-31 | End: 2022-07-31 | Stop reason: HOSPADM

## 2022-07-31 RX ORDER — FENTANYL CITRATE 50 UG/ML
50 INJECTION, SOLUTION INTRAMUSCULAR; INTRAVENOUS
Status: DISCONTINUED | OUTPATIENT
Start: 2022-07-31 | End: 2022-07-31 | Stop reason: HOSPADM

## 2022-07-31 RX ORDER — FENTANYL CITRATE 50 UG/ML
INJECTION, SOLUTION INTRAMUSCULAR; INTRAVENOUS
Status: DISPENSED
Start: 2022-07-31 | End: 2022-08-01

## 2022-07-31 RX ORDER — LIDOCAINE HYDROCHLORIDE 10 MG/ML
INJECTION, SOLUTION EPIDURAL; INFILTRATION; INTRACAUDAL; PERINEURAL AS NEEDED
Status: DISCONTINUED | OUTPATIENT
Start: 2022-07-31 | End: 2022-07-31 | Stop reason: HOSPADM

## 2022-07-31 RX ORDER — DEXAMETHASONE SODIUM PHOSPHATE 4 MG/ML
INJECTION, SOLUTION INTRA-ARTICULAR; INTRALESIONAL; INTRAMUSCULAR; INTRAVENOUS; SOFT TISSUE AS NEEDED
Status: DISCONTINUED | OUTPATIENT
Start: 2022-07-31 | End: 2022-07-31 | Stop reason: SURG

## 2022-07-31 RX ORDER — MAGNESIUM SULFATE HEPTAHYDRATE 40 MG/ML
4 INJECTION, SOLUTION INTRAVENOUS AS NEEDED
Status: DISCONTINUED | OUTPATIENT
Start: 2022-07-31 | End: 2022-08-06 | Stop reason: SDUPTHER

## 2022-07-31 RX ORDER — EPTIFIBATIDE 2 MG/ML
INJECTION, SOLUTION INTRAVENOUS AS NEEDED
Status: DISCONTINUED | OUTPATIENT
Start: 2022-07-31 | End: 2022-07-31 | Stop reason: HOSPADM

## 2022-07-31 RX ADMIN — PROPOFOL 200 MG: 10 INJECTION, EMULSION INTRAVENOUS at 10:32

## 2022-07-31 RX ADMIN — SENNOSIDES AND DOCUSATE SODIUM 2 TABLET: 50; 8.6 TABLET ORAL at 20:11

## 2022-07-31 RX ADMIN — DEXAMETHASONE SODIUM PHOSPHATE 4 MG: 4 INJECTION, SOLUTION INTRA-ARTICULAR; INTRALESIONAL; INTRAMUSCULAR; INTRAVENOUS; SOFT TISSUE at 16:08

## 2022-07-31 RX ADMIN — MAGNESIUM SULFATE HEPTAHYDRATE 4 G: 40 INJECTION, SOLUTION INTRAVENOUS at 00:30

## 2022-07-31 RX ADMIN — ONDANSETRON 4 MG: 2 INJECTION INTRAMUSCULAR; INTRAVENOUS at 12:00

## 2022-07-31 RX ADMIN — NIMODIPINE 60 MG: 30 CAPSULE, LIQUID FILLED ORAL at 18:18

## 2022-07-31 RX ADMIN — SODIUM CHLORIDE, POTASSIUM CHLORIDE, SODIUM LACTATE AND CALCIUM CHLORIDE: 600; 310; 30; 20 INJECTION, SOLUTION INTRAVENOUS at 10:25

## 2022-07-31 RX ADMIN — ASPIRIN 81 MG CHEWABLE TABLET 81 MG: 81 TABLET CHEWABLE at 20:10

## 2022-07-31 RX ADMIN — OXYCODONE HYDROCHLORIDE AND ACETAMINOPHEN 1 TABLET: 7.5; 325 TABLET ORAL at 02:20

## 2022-07-31 RX ADMIN — LISINOPRIL 5 MG: 5 TABLET ORAL at 09:01

## 2022-07-31 RX ADMIN — SUGAMMADEX 500 MG: 100 INJECTION, SOLUTION INTRAVENOUS at 12:17

## 2022-07-31 RX ADMIN — Medication 3 ML: at 14:39

## 2022-07-31 RX ADMIN — ROCURONIUM BROMIDE 100 MG: 10 INJECTION INTRAVENOUS at 10:32

## 2022-07-31 RX ADMIN — SODIUM CHLORIDE 2 G: 1 TABLET ORAL at 18:18

## 2022-07-31 RX ADMIN — DEXAMETHASONE SODIUM PHOSPHATE 4 MG: 4 INJECTION INTRA-ARTICULAR; INTRALESIONAL; INTRAMUSCULAR; INTRAVENOUS; SOFT TISSUE at 00:30

## 2022-07-31 RX ADMIN — HYDROMORPHONE HYDROCHLORIDE 1 MG: 1 INJECTION, SOLUTION INTRAMUSCULAR; INTRAVENOUS; SUBCUTANEOUS at 00:28

## 2022-07-31 RX ADMIN — HYDROMORPHONE HYDROCHLORIDE 1 MG: 1 INJECTION, SOLUTION INTRAMUSCULAR; INTRAVENOUS; SUBCUTANEOUS at 18:26

## 2022-07-31 RX ADMIN — SODIUM CHLORIDE 2 G: 1 TABLET ORAL at 09:54

## 2022-07-31 RX ADMIN — ROCURONIUM BROMIDE 20 MG: 10 INJECTION INTRAVENOUS at 11:28

## 2022-07-31 RX ADMIN — Medication 1 PATCH: at 09:06

## 2022-07-31 RX ADMIN — HEPARIN SODIUM 5000 UNITS: 1000 INJECTION, SOLUTION INTRAVENOUS; SUBCUTANEOUS at 11:40

## 2022-07-31 RX ADMIN — NIMODIPINE 60 MG: 30 CAPSULE, LIQUID FILLED ORAL at 02:20

## 2022-07-31 RX ADMIN — LIDOCAINE HYDROCHLORIDE 50 MG: 10 INJECTION, SOLUTION EPIDURAL; INFILTRATION; INTRACAUDAL; PERINEURAL at 10:32

## 2022-07-31 RX ADMIN — TICAGRELOR 180 MG: 90 TABLET ORAL at 12:57

## 2022-07-31 RX ADMIN — NIMODIPINE 60 MG: 30 CAPSULE, LIQUID FILLED ORAL at 15:03

## 2022-07-31 RX ADMIN — NIMODIPINE 60 MG: 30 CAPSULE, LIQUID FILLED ORAL at 21:30

## 2022-07-31 RX ADMIN — DEXAMETHASONE SODIUM PHOSPHATE 4 MG: 4 INJECTION INTRA-ARTICULAR; INTRALESIONAL; INTRAMUSCULAR; INTRAVENOUS; SOFT TISSUE at 10:57

## 2022-07-31 RX ADMIN — HYDROMORPHONE HYDROCHLORIDE 1 MG: 1 INJECTION, SOLUTION INTRAMUSCULAR; INTRAVENOUS; SUBCUTANEOUS at 09:54

## 2022-07-31 RX ADMIN — LEVETIRACETAM 500 MG: 500 TABLET, FILM COATED ORAL at 09:01

## 2022-07-31 RX ADMIN — SODIUM CHLORIDE 50 ML/HR: 3 INJECTION, SOLUTION INTRAVENOUS at 21:30

## 2022-07-31 RX ADMIN — DEXAMETHASONE SODIUM PHOSPHATE 4 MG: 4 INJECTION, SOLUTION INTRA-ARTICULAR; INTRALESIONAL; INTRAMUSCULAR; INTRAVENOUS; SOFT TISSUE at 21:31

## 2022-07-31 RX ADMIN — OXYCODONE HYDROCHLORIDE AND ACETAMINOPHEN 1 TABLET: 7.5; 325 TABLET ORAL at 20:10

## 2022-07-31 RX ADMIN — PANTOPRAZOLE SODIUM 40 MG: 40 TABLET, DELAYED RELEASE ORAL at 09:01

## 2022-07-31 RX ADMIN — Medication 10 ML: at 09:55

## 2022-07-31 RX ADMIN — SODIUM CHLORIDE 500 ML: 9 INJECTION, SOLUTION INTRAVENOUS at 16:51

## 2022-07-31 RX ADMIN — NIMODIPINE 60 MG: 30 CAPSULE, LIQUID FILLED ORAL at 06:21

## 2022-07-31 RX ADMIN — HYDROMORPHONE HYDROCHLORIDE 1 MG: 1 INJECTION, SOLUTION INTRAMUSCULAR; INTRAVENOUS; SUBCUTANEOUS at 04:41

## 2022-07-31 RX ADMIN — LEVETIRACETAM 500 MG: 500 TABLET, FILM COATED ORAL at 20:10

## 2022-07-31 RX ADMIN — OXYCODONE HYDROCHLORIDE AND ACETAMINOPHEN 1 TABLET: 7.5; 325 TABLET ORAL at 15:03

## 2022-07-31 RX ADMIN — DEXAMETHASONE SODIUM PHOSPHATE 4 MG: 4 INJECTION INTRA-ARTICULAR; INTRALESIONAL; INTRAMUSCULAR; INTRAVENOUS; SOFT TISSUE at 06:29

## 2022-07-31 RX ADMIN — NIMODIPINE 60 MG: 30 CAPSULE, LIQUID FILLED ORAL at 09:54

## 2022-07-31 RX ADMIN — SODIUM CHLORIDE 60 ML/HR: 3 INJECTION, SOLUTION INTRAVENOUS at 07:08

## 2022-07-31 RX ADMIN — TICAGRELOR 90 MG: 90 TABLET ORAL at 20:10

## 2022-07-31 RX ADMIN — OXYCODONE HYDROCHLORIDE AND ACETAMINOPHEN 1 TABLET: 7.5; 325 TABLET ORAL at 06:29

## 2022-07-31 RX ADMIN — HYDROMORPHONE HYDROCHLORIDE 1 MG: 1 INJECTION, SOLUTION INTRAMUSCULAR; INTRAVENOUS; SUBCUTANEOUS at 23:37

## 2022-07-31 RX ADMIN — SENNOSIDES AND DOCUSATE SODIUM 2 TABLET: 50; 8.6 TABLET ORAL at 09:01

## 2022-07-31 NOTE — ANESTHESIA POSTPROCEDURE EVALUATION
Patient: Mary Celis    Procedure Summary     Date: 07/31/22 Room / Location: [facility] /  [facility]    Anesthesia Start: 1025 Anesthesia Stop:     Procedure: CAROTID CEREBRAL ANGIOGRAM BILATERAL (Bilateral ) Diagnosis:     Providers: Duran Willis MD Provider: Josesito Morocho MD    Anesthesia Type: general ASA Status: 4          Anesthesia Type: general    Vitals  Vitals Value Taken Time   /98 07/31/22 1241   Temp 98 °F (36.7 °C) 07/31/22 1241   Pulse 58 07/31/22 1241   Resp 14 07/31/22 1241   SpO2 96 % 07/31/22 1241           Post Anesthesia Care and Evaluation    Patient location during evaluation: PACU  Patient participation: complete - patient participated  Level of consciousness: awake and alert  Pain management: adequate    Airway patency: patent  Anesthetic complications: No anesthetic complications  PONV Status: none  Cardiovascular status: hemodynamically stable and acceptable  Respiratory status: nonlabored ventilation, acceptable and nasal cannula  Hydration status: acceptable

## 2022-07-31 NOTE — ANESTHESIA PROCEDURE NOTES
Airway  Urgency: elective    Date/Time: 7/31/2022 10:35 AM  Airway not difficult    General Information and Staff    Patient location during procedure: OR  CRNA/CAA: Rhina Short CRNA    Indications and Patient Condition  Indications for airway management: airway protection    Preoxygenated: yes  MILS not maintained throughout  Mask difficulty assessment: 1 - vent by mask    Final Airway Details  Final airway type: endotracheal airway      Successful airway: ETT  Cuffed: yes   Successful intubation technique: direct laryngoscopy  Endotracheal tube insertion site: oral  Blade: Herman  Blade size: 3  ETT size (mm): 7.5  Cormack-Lehane Classification: grade IIb - view of arytenoids or posterior of glottis only  Placement verified by: chest auscultation and capnometry   Cuff volume (mL): 8  Measured from: lips  ETT/EBT  to lips (cm): 22  Number of attempts at approach: 1  Assessment: lips, teeth, and gum same as pre-op and atraumatic intubation    Additional Comments  Negative epigastric sounds, Breath sound equal bilaterally with symmetric chest rise and fall

## 2022-07-31 NOTE — ANESTHESIA PREPROCEDURE EVALUATION
Anesthesia Evaluation     Patient summary reviewed and Nursing notes reviewed   no history of anesthetic complications:  NPO Solid Status: > 8 hours  NPO Liquid Status: > 2 hours           Airway   Mallampati: II  TM distance: >3 FB  Neck ROM: full  No difficulty expected  Dental - normal exam     Pulmonary - normal exam    breath sounds clear to auscultation  (+) a smoker (25 pack year) Current,   Cardiovascular - normal exam    Rhythm: regular  Rate: normal    (+) hypertension,       Neuro/Psych    ROS Comment: SAH with expressive aphasia  GI/Hepatic/Renal/Endo - negative ROS     Musculoskeletal     Abdominal    Substance History      OB/GYN          Other   arthritis,                      Anesthesia Plan    ASA 4     general     intravenous induction     Anesthetic plan, risks, benefits, and alternatives have been provided, discussed and informed consent has been obtained with: patient.    Plan discussed with CRNA.        CODE STATUS:    Level Of Support Discussed With: Patient  Code Status (Patient has no pulse and is not breathing): CPR (Attempt to Resuscitate)  Medical Interventions (Patient has pulse or is breathing): Full Support

## 2022-07-31 NOTE — ANESTHESIA PROCEDURE NOTES
Arterial Line      Patient reassessed immediately prior to procedure    Patient location during procedure: pre-op  Start time: 7/31/2022 10:34 AM   Line placed for hemodynamic monitoring.  Performed By   Anesthesiologist: Josesito Morocho MD  Preanesthetic Checklist  Completed: patient identified, IV checked, site marked, risks and benefits discussed, surgical consent, monitors and equipment checked, pre-op evaluation and timeout performed  Arterial Line Prep   Sterile Tech: cap, gloves and sterile barriers  Prep: ChloraPrep  Patient monitoring: blood pressure monitoring, continuous pulse oximetry and EKG  Arterial Line Procedure   Laterality:left  Location:  radial artery  Catheter size: 20 G   Guidance: palpation technique  Number of attempts: 1  Successful placement: yes  Post Assessment   Dressing Type: occlusive dressing applied, secured with tape and wrist guard applied.   Complications no  Circ/Move/Sens Assessment: normal and unchanged.   Patient Tolerance: patient tolerated the procedure well with no apparent complications             The patient ID was verified. She was instructed on how to turn the Home Sleep Testing device on and off, how to apply the sensors. She was encouraged to sleep on supine position and must have 6 hours of sleep. The patient was instructed not to get the device wet and return it back to us. All questions were answered prior to patient leaving. This was a curbside patient contact.

## 2022-08-01 ENCOUNTER — APPOINTMENT (OUTPATIENT)
Dept: CARDIOLOGY | Facility: HOSPITAL | Age: 47
End: 2022-08-01

## 2022-08-01 ENCOUNTER — APPOINTMENT (OUTPATIENT)
Dept: CT IMAGING | Facility: HOSPITAL | Age: 47
End: 2022-08-01

## 2022-08-01 LAB
ANION GAP SERPL CALCULATED.3IONS-SCNC: 6 MMOL/L (ref 5–15)
BASOPHILS # BLD AUTO: 0.03 10*3/MM3 (ref 0–0.2)
BASOPHILS NFR BLD AUTO: 0.1 % (ref 0–1.5)
BH CV VAS TCD LEFT DISTAL M1: 118 CM/SEC
BH CV VAS TCD LEFT DISTAL M1: 135 CM/SEC
BH CV VAS TCD LEFT MID M1: 135 CM/SEC
BH CV VAS TCD LEFT MID M1: 180 CM/SEC
BH CV VAS TCD LEFT PROXIMAL M1: 87 CM/SEC
BH CV VAS TCD LEFT PROXIMAL M1: 99 CM/SEC
BH CV VAS TCD LEFT TERMINAL ICA: 84 CM/SEC
BH CV VAS TCD LEFT TERMINAL ICA: 97 CM/SEC
BH CV VAS TCD RIGHT DISTAL M1: 55 CM/SEC
BH CV VAS TCD RIGHT DISTAL M1: 61 CM/SEC
BH CV VAS TCD RIGHT MID M1: 118 CM/SEC
BH CV VAS TCD RIGHT MID M1: 92 CM/SEC
BH CV VAS TCD RIGHT PROXIMAL M1: 150 CM/SEC
BH CV VAS TCD RIGHT PROXIMAL M1: 185 CM/SEC
BH CV VAS TCD RIGHT TERMINAL ICA: 58 CM/SEC
BH CV VAS TCD RIGHT TERMINAL ICA: 81 CM/SEC
BUN SERPL-MCNC: 15 MG/DL (ref 6–20)
BUN/CREAT SERPL: 30.6 (ref 7–25)
CALCIUM SPEC-SCNC: 8.1 MG/DL (ref 8.6–10.5)
CHLORIDE SERPL-SCNC: 106 MMOL/L (ref 98–107)
CO2 SERPL-SCNC: 26 MMOL/L (ref 22–29)
CREAT SERPL-MCNC: 0.49 MG/DL (ref 0.57–1)
DEPRECATED RDW RBC AUTO: 47.8 FL (ref 37–54)
EGFRCR SERPLBLD CKD-EPI 2021: 117.9 ML/MIN/1.73
EOSINOPHIL # BLD AUTO: 0 10*3/MM3 (ref 0–0.4)
EOSINOPHIL NFR BLD AUTO: 0 % (ref 0.3–6.2)
ERYTHROCYTE [DISTWIDTH] IN BLOOD BY AUTOMATED COUNT: 15.9 % (ref 12.3–15.4)
GLUCOSE BLDC GLUCOMTR-MCNC: 123 MG/DL (ref 70–130)
GLUCOSE BLDC GLUCOMTR-MCNC: 150 MG/DL (ref 70–130)
GLUCOSE BLDC GLUCOMTR-MCNC: 184 MG/DL (ref 70–130)
GLUCOSE BLDC GLUCOMTR-MCNC: 235 MG/DL (ref 70–130)
GLUCOSE SERPL-MCNC: 146 MG/DL (ref 65–99)
HCT VFR BLD AUTO: 33.6 % (ref 34–46.6)
HGB BLD-MCNC: 10.4 G/DL (ref 12–15.9)
IMM GRANULOCYTES # BLD AUTO: 0.28 10*3/MM3 (ref 0–0.05)
IMM GRANULOCYTES NFR BLD AUTO: 1.3 % (ref 0–0.5)
LYMPHOCYTES # BLD AUTO: 0.7 10*3/MM3 (ref 0.7–3.1)
LYMPHOCYTES NFR BLD AUTO: 3.2 % (ref 19.6–45.3)
MAGNESIUM SERPL-MCNC: 2.1 MG/DL (ref 1.6–2.6)
MAXIMAL PREDICTED HEART RATE: 174 BPM
MAXIMAL PREDICTED HEART RATE: 174 BPM
MCH RBC QN AUTO: 25.4 PG (ref 26.6–33)
MCHC RBC AUTO-ENTMCNC: 31 G/DL (ref 31.5–35.7)
MCV RBC AUTO: 82.2 FL (ref 79–97)
MONOCYTES # BLD AUTO: 1.29 10*3/MM3 (ref 0.1–0.9)
MONOCYTES NFR BLD AUTO: 5.8 % (ref 5–12)
NEUTROPHILS NFR BLD AUTO: 19.85 10*3/MM3 (ref 1.7–7)
NEUTROPHILS NFR BLD AUTO: 89.6 % (ref 42.7–76)
NRBC BLD AUTO-RTO: 0 /100 WBC (ref 0–0.2)
PHOSPHATE SERPL-MCNC: 3.2 MG/DL (ref 2.5–4.5)
PLATELET # BLD AUTO: 437 10*3/MM3 (ref 140–450)
PMV BLD AUTO: 10.9 FL (ref 6–12)
POTASSIUM SERPL-SCNC: 4.6 MMOL/L (ref 3.5–5.2)
RBC # BLD AUTO: 4.09 10*6/MM3 (ref 3.77–5.28)
SODIUM SERPL-SCNC: 137 MMOL/L (ref 136–145)
SODIUM SERPL-SCNC: 138 MMOL/L (ref 136–145)
SODIUM SERPL-SCNC: 138 MMOL/L (ref 136–145)
SODIUM SERPL-SCNC: 139 MMOL/L (ref 136–145)
SODIUM SERPL-SCNC: 139 MMOL/L (ref 136–145)
SODIUM SERPL-SCNC: 155 MMOL/L (ref 136–145)
STRESS TARGET HR: 148 BPM
STRESS TARGET HR: 148 BPM
WBC NRBC COR # BLD: 22.15 10*3/MM3 (ref 3.4–10.8)

## 2022-08-01 PROCEDURE — 97530 THERAPEUTIC ACTIVITIES: CPT

## 2022-08-01 PROCEDURE — 83735 ASSAY OF MAGNESIUM: CPT | Performed by: NEUROLOGICAL SURGERY

## 2022-08-01 PROCEDURE — 0 IOPAMIDOL PER 1 ML: Performed by: INTERNAL MEDICINE

## 2022-08-01 PROCEDURE — 63710000001 INSULIN LISPRO (HUMAN) PER 5 UNITS: Performed by: PHYSICIAN ASSISTANT

## 2022-08-01 PROCEDURE — 84100 ASSAY OF PHOSPHORUS: CPT | Performed by: NURSE PRACTITIONER

## 2022-08-01 PROCEDURE — 84295 ASSAY OF SERUM SODIUM: CPT | Performed by: INTERNAL MEDICINE

## 2022-08-01 PROCEDURE — 70496 CT ANGIOGRAPHY HEAD: CPT

## 2022-08-01 PROCEDURE — 25010000002 DEXAMETHASONE PER 1 MG: Performed by: PHYSICIAN ASSISTANT

## 2022-08-01 PROCEDURE — 99231 SBSQ HOSP IP/OBS SF/LOW 25: CPT | Performed by: NEUROLOGICAL SURGERY

## 2022-08-01 PROCEDURE — 82962 GLUCOSE BLOOD TEST: CPT

## 2022-08-01 PROCEDURE — 93886 INTRACRANIAL COMPLETE STUDY: CPT

## 2022-08-01 PROCEDURE — 80048 BASIC METABOLIC PNL TOTAL CA: CPT | Performed by: NEUROLOGICAL SURGERY

## 2022-08-01 PROCEDURE — 92507 TX SP LANG VOICE COMM INDIV: CPT

## 2022-08-01 PROCEDURE — 25010000002 ONDANSETRON PER 1 MG: Performed by: NEUROLOGICAL SURGERY

## 2022-08-01 PROCEDURE — 99232 SBSQ HOSP IP/OBS MODERATE 35: CPT | Performed by: INTERNAL MEDICINE

## 2022-08-01 PROCEDURE — 85025 COMPLETE CBC W/AUTO DIFF WBC: CPT | Performed by: NEUROLOGICAL SURGERY

## 2022-08-01 PROCEDURE — 84295 ASSAY OF SERUM SODIUM: CPT | Performed by: NEUROLOGICAL SURGERY

## 2022-08-01 PROCEDURE — 97164 PT RE-EVAL EST PLAN CARE: CPT

## 2022-08-01 PROCEDURE — 25010000002 HYDROMORPHONE 1 MG/ML SOLUTION: Performed by: NEUROLOGICAL SURGERY

## 2022-08-01 RX ORDER — SIMETHICONE 80 MG
80 TABLET,CHEWABLE ORAL 4 TIMES DAILY PRN
Status: DISCONTINUED | OUTPATIENT
Start: 2022-08-01 | End: 2022-08-11 | Stop reason: HOSPADM

## 2022-08-01 RX ORDER — AMOXICILLIN 250 MG
2 CAPSULE ORAL 2 TIMES DAILY
Status: DISCONTINUED | OUTPATIENT
Start: 2022-08-01 | End: 2022-08-11 | Stop reason: HOSPADM

## 2022-08-01 RX ORDER — POLYETHYLENE GLYCOL 3350 17 G/17G
17 POWDER, FOR SOLUTION ORAL DAILY
Status: DISCONTINUED | OUTPATIENT
Start: 2022-08-02 | End: 2022-08-11 | Stop reason: HOSPADM

## 2022-08-01 RX ORDER — BISACODYL 5 MG/1
5 TABLET, DELAYED RELEASE ORAL DAILY PRN
Status: DISCONTINUED | OUTPATIENT
Start: 2022-08-01 | End: 2022-08-11 | Stop reason: HOSPADM

## 2022-08-01 RX ORDER — BISACODYL 10 MG
10 SUPPOSITORY, RECTAL RECTAL DAILY PRN
Status: DISCONTINUED | OUTPATIENT
Start: 2022-08-01 | End: 2022-08-11 | Stop reason: HOSPADM

## 2022-08-01 RX ORDER — 3% SODIUM CHLORIDE 3 G/100ML
75 INJECTION, SOLUTION INTRAVENOUS CONTINUOUS
Status: DISCONTINUED | OUTPATIENT
Start: 2022-08-01 | End: 2022-08-02 | Stop reason: ALTCHOICE

## 2022-08-01 RX ADMIN — SODIUM CHLORIDE 70 ML/HR: 9 INJECTION, SOLUTION INTRAVENOUS at 22:17

## 2022-08-01 RX ADMIN — SODIUM CHLORIDE 50 ML/HR: 3 INJECTION, SOLUTION INTRAVENOUS at 08:14

## 2022-08-01 RX ADMIN — DEXAMETHASONE SODIUM PHOSPHATE 4 MG: 4 INJECTION, SOLUTION INTRA-ARTICULAR; INTRALESIONAL; INTRAMUSCULAR; INTRAVENOUS; SOFT TISSUE at 10:39

## 2022-08-01 RX ADMIN — LEVETIRACETAM 500 MG: 500 TABLET, FILM COATED ORAL at 20:07

## 2022-08-01 RX ADMIN — OXYCODONE HYDROCHLORIDE AND ACETAMINOPHEN 1 TABLET: 7.5; 325 TABLET ORAL at 01:57

## 2022-08-01 RX ADMIN — NIMODIPINE 60 MG: 30 CAPSULE, LIQUID FILLED ORAL at 01:57

## 2022-08-01 RX ADMIN — NIMODIPINE 60 MG: 30 CAPSULE, LIQUID FILLED ORAL at 18:22

## 2022-08-01 RX ADMIN — OXYCODONE HYDROCHLORIDE AND ACETAMINOPHEN 1 TABLET: 7.5; 325 TABLET ORAL at 20:07

## 2022-08-01 RX ADMIN — ASPIRIN 81 MG CHEWABLE TABLET 81 MG: 81 TABLET CHEWABLE at 08:15

## 2022-08-01 RX ADMIN — NIMODIPINE 60 MG: 30 CAPSULE, LIQUID FILLED ORAL at 22:10

## 2022-08-01 RX ADMIN — ONDANSETRON 4 MG: 2 INJECTION INTRAMUSCULAR; INTRAVENOUS at 12:23

## 2022-08-01 RX ADMIN — LEVETIRACETAM 500 MG: 500 TABLET, FILM COATED ORAL at 08:15

## 2022-08-01 RX ADMIN — SODIUM CHLORIDE 50 ML/HR: 9 INJECTION, SOLUTION INTRAVENOUS at 03:14

## 2022-08-01 RX ADMIN — ONDANSETRON 4 MG: 2 INJECTION INTRAMUSCULAR; INTRAVENOUS at 05:41

## 2022-08-01 RX ADMIN — SODIUM CHLORIDE 2 G: 1 TABLET ORAL at 08:15

## 2022-08-01 RX ADMIN — PANTOPRAZOLE SODIUM 40 MG: 40 INJECTION, POWDER, FOR SOLUTION INTRAVENOUS at 05:41

## 2022-08-01 RX ADMIN — TICAGRELOR 90 MG: 90 TABLET ORAL at 08:14

## 2022-08-01 RX ADMIN — DEXAMETHASONE SODIUM PHOSPHATE 4 MG: 4 INJECTION, SOLUTION INTRA-ARTICULAR; INTRALESIONAL; INTRAMUSCULAR; INTRAVENOUS; SOFT TISSUE at 22:10

## 2022-08-01 RX ADMIN — NIMODIPINE 60 MG: 30 CAPSULE, LIQUID FILLED ORAL at 10:39

## 2022-08-01 RX ADMIN — SENNOSIDES AND DOCUSATE SODIUM 2 TABLET: 50; 8.6 TABLET ORAL at 20:07

## 2022-08-01 RX ADMIN — POLYETHYLENE GLYCOL 3350 17 G: 17 POWDER, FOR SOLUTION ORAL at 12:09

## 2022-08-01 RX ADMIN — Medication 1 PATCH: at 08:15

## 2022-08-01 RX ADMIN — IOPAMIDOL 75 ML: 755 INJECTION, SOLUTION INTRAVENOUS at 05:21

## 2022-08-01 RX ADMIN — SODIUM CHLORIDE 50 ML/HR: 3 INJECTION, SOLUTION INTRAVENOUS at 20:05

## 2022-08-01 RX ADMIN — OXYCODONE HYDROCHLORIDE AND ACETAMINOPHEN 1 TABLET: 7.5; 325 TABLET ORAL at 16:09

## 2022-08-01 RX ADMIN — SENNOSIDES AND DOCUSATE SODIUM 2 TABLET: 50; 8.6 TABLET ORAL at 08:14

## 2022-08-01 RX ADMIN — INSULIN LISPRO 2 UNITS: 100 INJECTION, SOLUTION INTRAVENOUS; SUBCUTANEOUS at 18:21

## 2022-08-01 RX ADMIN — LISINOPRIL 5 MG: 5 TABLET ORAL at 08:15

## 2022-08-01 RX ADMIN — NIMODIPINE 60 MG: 30 CAPSULE, LIQUID FILLED ORAL at 14:12

## 2022-08-01 RX ADMIN — OXYCODONE HYDROCHLORIDE AND ACETAMINOPHEN 1 TABLET: 7.5; 325 TABLET ORAL at 08:18

## 2022-08-01 RX ADMIN — SODIUM CHLORIDE 2 G: 1 TABLET ORAL at 12:09

## 2022-08-01 RX ADMIN — HYDROMORPHONE HYDROCHLORIDE 1 MG: 1 INJECTION, SOLUTION INTRAMUSCULAR; INTRAVENOUS; SUBCUTANEOUS at 22:10

## 2022-08-01 RX ADMIN — TICAGRELOR 90 MG: 90 TABLET ORAL at 20:07

## 2022-08-01 RX ADMIN — NIMODIPINE 60 MG: 30 CAPSULE, LIQUID FILLED ORAL at 05:41

## 2022-08-01 RX ADMIN — DEXAMETHASONE SODIUM PHOSPHATE 4 MG: 4 INJECTION, SOLUTION INTRA-ARTICULAR; INTRALESIONAL; INTRAMUSCULAR; INTRAVENOUS; SOFT TISSUE at 03:19

## 2022-08-01 RX ADMIN — ACETAMINOPHEN 325MG 325 MG: 325 TABLET ORAL at 08:15

## 2022-08-01 RX ADMIN — SODIUM CHLORIDE 2 G: 1 TABLET ORAL at 18:20

## 2022-08-01 RX ADMIN — OXYCODONE HYDROCHLORIDE AND ACETAMINOPHEN 1 TABLET: 7.5; 325 TABLET ORAL at 12:08

## 2022-08-01 RX ADMIN — ACETAMINOPHEN 325MG 650 MG: 325 TABLET ORAL at 03:56

## 2022-08-01 RX ADMIN — HYDROMORPHONE HYDROCHLORIDE 1 MG: 1 INJECTION, SOLUTION INTRAMUSCULAR; INTRAVENOUS; SUBCUTANEOUS at 05:04

## 2022-08-01 RX ADMIN — DEXAMETHASONE SODIUM PHOSPHATE 4 MG: 4 INJECTION, SOLUTION INTRA-ARTICULAR; INTRALESIONAL; INTRAMUSCULAR; INTRAVENOUS; SOFT TISSUE at 16:08

## 2022-08-01 NOTE — PROGRESS NOTES
INTENSIVIST   PROGRESS NOTE     Hospital:  LOS: 7 days     Ms. Mary Celis, 46 y.o. female is followed for a Chief Complaint of: SAH, Headache      Subjective   S     Interval History:  No acute events. Complaining of headache this AM.        The patient's relevant past medical, surgical and social history were reviewed and updated in Epic as appropriate.      ROS:   Constitutional: Negative for fever.   Respiratory: Negative for dyspnea.   Cardiovascular: Negative for chest pain.   Gastrointestinal: Negative for  nausea, vomiting and diarrhea.     Objective   O     Vitals:  Temp  Min: 98.1 °F (36.7 °C)  Max: 98.6 °F (37 °C)  BP  Min: 137/53  Max: 182/79  Pulse  Min: 49  Max: 63  Resp  Min: 14  Max: 18  SpO2  Min: 94 %  Max: 100 % No data recorded    Intake/Ouptut 24 hrs (7:00AM - 6:59 AM)  Intake & Output (last 3 days)       07/29 0701 07/30 0700 07/30 0701 07/31 0700 07/31 0701 08/01 0700 08/01 0701 08/02 0700    P.O. 330 1075 1090 200    I.V. (mL/kg) 2290.4 (24.5) 2091.8 (22.9) 1895.8 (19.7) 564.4 (5.9)    IV Piggyback   143     Total Intake(mL/kg) 2620.4 (28) 3166.8 (34.6) 3128.8 (32.6) 764.4 (8)    Urine (mL/kg/hr) 4000 (1.8) 3025 (1.4) 3200 (1.4) 875 (1.3)    Stool 0       Total Output 4000 3025 3200 875    Net -1379.6 +141.8 -71.2 -110.6            Urine Unmeasured Occurrence 3 x 3 x      Stool Unmeasured Occurrence 1 x             Medications (drips):  niCARdipine, Last Rate: Stopped (07/31/22 1439)  sodium chloride, Last Rate: 50 mL/hr (08/01/22 0814)  sodium chloride, Last Rate: 50 mL/hr (08/01/22 0314)          Physical Examination  Telemetry:  Normal sinus rhythm.    Constitutional:  No acute distress.  Resting in bed in the dark.    Eyes: No scleral icterus.   PERRL, EOM intact.    Neck:  Supple, FROM   Cardiovascular: Normal rate, regular and rhythm. Normal heart sounds.  No murmurs, gallop or rub.   Respiratory: No respiratory distress. Normal respiratory effort.  Normal breath sounds  Clear to  auscultation   Abdominal:  Soft. No masses. Nontender. No distension. No HSM.   Extremities: No digital cyanosis. No clubbing.  No peripheral edema.   Skin: No rashes, lesions or ulcers   Neurological:   Alert and Oriented to person, place, and time.              Results from last 7 days   Lab Units 08/01/22  0501 07/25/22  1712   WBC 10*3/mm3 22.15* 19.54*   HEMOGLOBIN g/dL 10.4* 13.8   MCV fL 82.2 80.8   PLATELETS 10*3/mm3 437 473*     Results from last 7 days   Lab Units 08/01/22  1304 08/01/22  0501 08/01/22  0029 07/31/22  1830 07/31/22  1415 07/31/22  0505 07/30/22  2213   SODIUM mmol/L 138 138 137   < > 137 138 137   POTASSIUM mmol/L  --  4.6  --   --   --  4.3 4.3   CO2 mmol/L  --  26.0  --   --   --  28.0 24.0   CREATININE mg/dL  --  0.49*  --   --   --  0.48* 0.51*   GLUCOSE mg/dL  --  146*  --   --   --  133* 188*   MAGNESIUM mg/dL  --  2.1  --   --  1.9 2.7* 1.7   PHOSPHORUS mg/dL  --  3.2  --   --   --   --   --     < > = values in this interval not displayed.     Estimated Creatinine Clearance: 170.5 mL/min (A) (by C-G formula based on SCr of 0.49 mg/dL (L)).  Results from last 7 days   Lab Units 07/25/22  1712   ALK PHOS U/L 130*   BILIRUBIN mg/dL 0.5   ALT (SGPT) U/L 17   AST (SGOT) U/L 19             Images:  Imaging Results (Last 24 Hours)     Procedure Component Value Units Date/Time    CT Angiogram Head [[id number]] Resulted: 08/01/22 0511     Updated: 08/01/22 0522            Results: Reviewed.  I reviewed the patient's new laboratory and imaging results.  I independently reviewed the patient's new images.    Medications: Reviewed.    Assessment & Plan   A / P     Ms. Celis is a 45yo F who presented to [facility] for headache. Imaging revealed a large subarachnoid hemorrhage with intraventricular involvement but no evidence of aneurysm and was transferred to [facility] on 7/25/22 for neurosurgical evaluation. Cerebral angiogram on 7/26 was negative for AVM or aneurysm. UDS was positive for  methamphetamines and opiates. She was started on Nimotop, Keppra and daily TCD. 3% Saline was started.     She was taken back to the cath lab by Dr. 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     Nutrition:   Diet Regular  Advance Directives:   Code Status and Medical Interventions:   Ordered at: 07/26/22 1546     Level Of Support Discussed With:    Patient     Code Status (Patient has no pulse and is not breathing):    CPR (Attempt to Resuscitate)     Medical Interventions (Patient has pulse or is breathing):    Full Support       Active Hospital Problems    Diagnosis    • **SAH     • Smoker    • Class 1 obesity in adult    • Illicit drug use (UDS + meth/amphetamines, opiates, and oxycodone)     • Essential hypertension    • Iron deficiency anemia due to chronic blood loss        Assessment / Plan:    1. 3% hypertonic saline. Goal -155.   2. SBP goal .   3. Daily TCDs  4. Nimotop, Decadron and Keppra  5. ASA and Brilinta  6. SSI  7. LUE duplex.  8. AM labs    High level of risk due to:  severe exacerbation of chronic illness and illness with threat to life or bodily function.    Plan of care and goals reviewed during interdisciplinary rounds.  I discussed the patient's findings and my recommendations with patient and nursing staff        Mary Batista, DO    Intensive Care Medicine and Pulmonary Medicine

## 2022-08-01 NOTE — PROGRESS NOTES
Subjective    This is a 46 y.o. female [place] 2 [place] 3 aneurysmal subarachnoid hemorrhage who is post-bleed day 7 post-op day 1 s/p coiling of a Basilar aneurysm.       Objective    Vitals:    22 0900   BP: 164/77   Pulse: 55   Resp:    Temp:    SpO2: 97%     Pulse Readings from Last 3 Encounters:   22 55   17 84   17 71     Systolic (24hrs), Avg:160 , Min:134 , Max:183     Diastolic (24hrs), Av, Min:53, Max:98    Temp (24hrs), Av °F (36.7 °C), Min:97.1 °F (36.2 °C), Max:98.6 °F (37 °C)    She reports improved head pain.  She still has some photophobia.  Cranial nerves II through XII are grossly intact.  She moves all 4 extremities to command with apparently full strength.  She is oriented to person, place, and event.    Lab Results   Component Value Date     2022         Assessment/Plan  Diagnosis: Subarachnoid Hemorrhage    Continue ICU with subarachnoid hemorrhage protocol  Goal serum sodium is 145-155, continue with hypertonic saline  Transcranial Dopplers today  Continue aspirin and Brilinta

## 2022-08-01 NOTE — THERAPY RE-EVALUATION
Patient Name: Mary Celis  : 1975    MRN: [id number]                              Today's Date: 2022       Admit Date: 2022    Visit Dx:     ICD-10-CM ICD-9-CM   1. SAH (subarachnoid hemorrhage) (HCC)  I60.9 430   2. Essential hypertension  I10 401.9   3. Iron malabsorption  K90.9 579.8   4. Right upper quadrant abdominal pain  R10.11 789.01   5. Iron deficiency anemia due to chronic blood loss  D50.0 280.0   6. Acute nonintractable headache, unspecified headache type  R51.9 784.0   7. Cognitive communication deficit  R41.841 799.52     Patient Active Problem List   Diagnosis   • Iron deficiency anemia due to chronic blood loss   • Right upper quadrant abdominal pain   • Iron malabsorption   • SAH    • Essential hypertension   • Smoker   • Class 1 obesity in adult   • Illicit drug use (UDS + meth/amphetamines, opiates, and oxycodone)      Past Medical History:   Diagnosis Date   • Abnormal Pap smear of cervix    • Anemia    • Arthritis    • Arthritis    • Body piercing     EARS   • Cancer (HCC)     REPORTS PRE-CERVICAL CANCER   • Depression with anxiety    • Gout    • History of blood transfusion    • History of transfusion     REPORTS HAS HAD SEVERAL TRANSFUSIONS, NO REACTIONS   • IBS (irritable bowel syndrome)    • Panic attack    • Tattoo     LEFT FOREARM   • Wears glasses      Past Surgical History:   Procedure Laterality Date   • CEREBRAL ANGIOGRAM N/A 2022    Procedure: Cerebral angiogram;  Surgeon: Spencer Hauser MD;  Location: [facility] INVASIVE LOCATION;  Service: Interventional Radiology;  Laterality: N/A;   • CHOLECYSTECTOMY     • CHOLECYSTECTOMY WITH INTRAOPERATIVE CHOLANGIOGRAM N/A 2017    Procedure: CHOLECYSTECTOMY LAPAROSCOPIC INTRAOPERATIVE CHOLANGIOGRAM;  Surgeon: Albaro Lopez MD;  Location: [facility] OR;  Service:    • COLONOSCOPY N/A 2017    Procedure: COLONOSCOPY;  Surgeon: Albaro Lopez MD;  Location: [facility] ENDOSCOPY;  Service:    • D & C HYSTEROSCOPY       VITAL SIGNS UNSTABLE WITH THIS PROCEDURE   • ENDOSCOPY N/A 7/28/2017    Procedure: ESOPHAGOGASTRODUODENOSCOPY WITH BIOPSIES;  Surgeon: Albaro Lopez MD;  Location: [facility] ENDOSCOPY;  Service:    • INTERVENTIONAL RADIOLOGY PROCEDURE Bilateral 7/31/2022    Procedure: CAROTID CEREBRAL ANGIOGRAM BILATERAL;  Surgeon: Duran Willis MD;  Location:  [facility] INVASIVE LOCATION;  Service: Interventional Radiology;  Laterality: Bilateral;   • TUBAL ABDOMINAL LIGATION     • WISDOM TOOTH EXTRACTION        General Information     Row Name 08/01/22 1156          Physical Therapy Time and Intention    Document Type re-evaluation (P)   -AB     Mode of Treatment physical therapy (P)   -AB     Row Name 08/01/22 1156          General Information    Patient Profile Reviewed yes (P)   -AB     Prior Level of Function -- (P)   See IE  -AB     Existing Precautions/Restrictions fall (P)   -AB     Barriers to Rehab none identified (P)   -AB     Row Name 08/01/22 1156          Living Environment    People in Home alone (P)   -AB     Row Name 08/01/22 1156          Home Main Entrance    Number of Stairs, Main Entrance one (P)   -AB     Stair Railings, Main Entrance none (P)   -AB     Row Name 08/01/22 1156          Stairs Within Home, Primary    Number of Stairs, Within Home, Primary twelve (P)   -AB     Row Name 08/01/22 1156          Cognition    Orientation Status (Cognition) oriented x 4 (P)   -AB     Row Name 08/01/22 1156          Safety Issues, Functional Mobility    Safety Issues Affecting Function (Mobility) awareness of need for assistance;impulsivity;insight into deficits/self-awareness;safety precaution awareness;safety precautions follow-through/compliance;judgment (P)   -AB     Impairments Affecting Function (Mobility) balance;coordination;endurance/activity tolerance;pain (P)   -AB     Comment, Safety Issues/Impairments (Mobility) Pt with continuous complaints of headache. (P)   -AB           User Key  (r) =  Recorded By, (t) = Taken By, (c) = Cosigned By    Initials Name Provider Type    Jackie Salter, PT Student PT Student               Mobility     Row Name 08/01/22 1158          Bed Mobility    Bed Mobility supine-sit (P)   -AB     Supine-Sit Stevens (Bed Mobility) supervision (P)   -AB     Assistive Device (Bed Mobility) bed rails;head of bed elevated (P)   -AB     Comment, (Bed Mobility) Pt required increased time to transfer secondary to abdominal pain. (P)   -AB     Row Name 08/01/22 1158          Transfers    Comment, (Transfers) Pt highly impulsive with all mvmts. Required max cues to prevent pulling on lines. (P)   -AB     Row Name 08/01/22 1158          Bed-Chair Transfer    Bed-Chair Stevens (Transfers) 1 person assist;supervision (P)   -AB     Assistive Device (Bed-Chair Transfers) other (see comments) (P)   BUE supported on tele monitor.  -AB     Row Name 08/01/22 1158          Sit-Stand Transfer    Sit-Stand Stevens (Transfers) supervision (P)   -AB     Row Name 08/01/22 1158          Gait/Stairs (Locomotion)    Stevens Level (Gait) verbal cues;standby assist;1 person assist (P)   -AB     Assistive Device (Gait) other (see comments) (P)   BUE supported on tele monitor.  -AB     Distance in Feet (Gait) 60 (P)   -AB     Deviations/Abnormal Patterns (Gait) base of support, narrow;benja decreased;stride length decreased (P)   -AB     Bilateral Gait Deviations forward flexed posture;heel strike decreased (P)   -AB     Comment, (Gait/Stairs) Pt demo's step through gait pattern with decreased stride length and forward flexed posture. Pt highly impulsive during gait and required max cues for safety awareness. Further ambulation was limited by pt's abdominal pain and headache. (P)   -AB           User Key  (r) = Recorded By, (t) = Taken By, (c) = Cosigned By    Initials Name Provider Type    Jackie Salter, PT Student PT Student               Obj/Interventions     Row Name 08/01/22  1201          Range of Motion Comprehensive    General Range of Motion bilateral lower extremity ROM WFL (P)   -AB     Row Name 08/01/22 1201          Strength Comprehensive (MMT)    General Manual Muscle Testing (MMT) Assessment no strength deficits identified (P)   -AB     Row Name 08/01/22 1201          Balance    Balance Assessment sitting static balance;sitting dynamic balance;standing static balance;standing dynamic balance (P)   -AB     Static Sitting Balance independent (P)   -AB     Dynamic Sitting Balance independent (P)   -AB     Position, Sitting Balance unsupported;sitting edge of bed (P)   -AB     Static Standing Balance supervision (P)   -AB     Dynamic Standing Balance supervision (P)   -AB     Position/Device Used, Standing Balance supported (P)   -AB     Balance Interventions sitting;standing;sit to stand;supported;static;dynamic;minimal challenge (P)   -AB     Comment, Balance No LOB (P)   -AB     Row Name 08/01/22 1201          Sensory Assessment (Somatosensory)    Sensory Assessment (Somatosensory) sensation intact (P)   -AB           User Key  (r) = Recorded By, (t) = Taken By, (c) = Cosigned By    Initials Name Provider Type    AB Jackie Tirado, PT Student PT Student               Goals/Plan     Row Name 08/01/22 1205          Bed Mobility Goal 1 (PT)    Activity/Assistive Device (Bed Mobility Goal 1, PT) sit to supine/supine to sit (P)   -AB     Wolfe Level/Cues Needed (Bed Mobility Goal 1, PT) independent (P)   -AB     Time Frame (Bed Mobility Goal 1, PT) long term goal (LTG);2 weeks (P)   -AB     Progress/Outcomes (Bed Mobility Goal 1, PT) goal ongoing (P)   -AB     Row Name 08/01/22 1205          Transfer Goal 1 (PT)    Activity/Assistive Device (Transfer Goal 1, PT) sit-to-stand/stand-to-sit (P)   -AB     Wolfe Level/Cues Needed (Transfer Goal 1, PT) independent (P)   -AB     Time Frame (Transfer Goal 1, PT) long term goal (LTG);2 weeks (P)   -AB     Progress/Outcome  (Transfer Goal 1, PT) goal ongoing (P)   -AB     Row Name 08/01/22 1205          Gait Training Goal 1 (PT)    Activity/Assistive Device (Gait Training Goal 1, PT) gait (walking locomotion) (P)   -AB     Sperry Level (Gait Training Goal 1, PT) independent (P)   -AB     Distance (Gait Training Goal 1, PT) 500 (P)   -AB     Time Frame (Gait Training Goal 1, PT) long term goal (LTG);2 weeks (P)   -AB     Row Name 08/01/22 1205          Stairs Goal 1 (PT)    Activity/Assistive Device (Stairs Goal 1, PT) ascending stairs;descending stairs;using handrail, right (P)   -AB     Sperry Level/Cues Needed (Stairs Goal 1, PT) standby assist (P)   -AB     Number of Stairs (Stairs Goal 1, PT) 12 (P)   -AB     Time Frame (Stairs Goal 1, PT) long term goal (LTG);2 weeks (P)   -AB     Progress/Outcome (Stairs Goal 1, PT) goal ongoing (P)   -AB     Row Name 08/01/22 1205          Therapy Assessment/Plan (PT)    Planned Therapy Interventions (PT) balance training;bed mobility training;gait training;home exercise program;patient/family education;postural re-education;stair training;strengthening;transfer training (P)   -AB           User Key  (r) = Recorded By, (t) = Taken By, (c) = Cosigned By    Initials Name Provider Type    AB Jackie Tirado, PT Student PT Student               Clinical Impression     Row Name 08/01/22 1202          Pain    Pretreatment Pain Rating 8/10 (P)   -AB     Posttreatment Pain Rating 9/10 (P)   -AB     Pain Location generalized (P)   -AB     Pain Location - abdomen;head (P)   -AB     Pre/Posttreatment Pain Comment increased pain with all activities; RN aware. (P)   -AB     Pain Intervention(s) Ambulation/increased activity;Repositioned;Rest (P)   -AB     Additional Documentation Pain Scale: Numbers Pre/Post-Treatment (Group) (P)   -AB     Row Name 08/01/22 1202          Plan of Care Review    Plan of Care Reviewed With patient (P)   -AB     Progress no change (P)   -AB     Outcome Evaluation Pt  is continuing to progress towards PT mobility goals. Pt ambulated 60' with SBA and BUE support, cues required for safety awareness as pt is highly impulsive. Further mobility was deferred by pt secondary to abdominal pain and headache. PT mobility goals remain appropriate with progress slower than expected secondary to s/p angiogram 7/31. Pt's primary limitations are poor motivation, impaired safety awareness, and increased pain levels. Pt will continue to benefit from skilled IPPT. PT rec d/c home with assist. (P)   -AB     Row Name 08/01/22 1202          Therapy Assessment/Plan (PT)    Rehab Potential (PT) good, to achieve stated therapy goals (P)   -AB     Criteria for Skilled Interventions Met (PT) yes;meets criteria;skilled treatment is necessary (P)   -AB     Therapy Frequency (PT) daily (P)   -AB     Row Name 08/01/22 1202          Vital Signs    Pre Systolic BP Rehab 152 (P)   -AB     Pre Treatment Diastolic BP 61 (P)   -AB     Pretreatment Heart Rate (beats/min) 59 (P)   -AB     Posttreatment Heart Rate (beats/min) 53 (P)   -AB     Pre SpO2 (%) 96 (P)   -AB     O2 Delivery Pre Treatment room air (P)   -AB     O2 Delivery Intra Treatment room air (P)   -AB     Post SpO2 (%) 95 (P)   -AB     O2 Delivery Post Treatment room air (P)   -AB     Pre Patient Position Supine (P)   -AB     Intra Patient Position Standing (P)   -AB     Post Patient Position Sitting (P)   -AB     Row Name 08/01/22 1202          Positioning and Restraints    Pre-Treatment Position in bed (P)   -AB     Post Treatment Position chair (P)   -AB     In Chair notified nsg;reclined;sitting;call light within reach;encouraged to call for assist;exit alarm on;RUE elevated;LUE elevated;legs elevated (P)   -AB           User Key  (r) = Recorded By, (t) = Taken By, (c) = Cosigned By    Initials Name Provider Type    Jackie Salter, PT Student PT Student               Outcome Measures     Row Name 08/01/22 1210 08/01/22 0800       How much help  from another person do you currently need...    Turning from your back to your side while in flat bed without using bedrails? 4 (P)   -AB 4  -JG    Moving from lying on back to sitting on the side of a flat bed without bedrails? 3 (P)   -AB 3  -JG    Moving to and from a bed to a chair (including a wheelchair)? 3 (P)   -AB 3  -JG    Standing up from a chair using your arms (e.g., wheelchair, bedside chair)? 3 (P)   -AB 3  -JG    Climbing 3-5 steps with a railing? 3 (P)   -AB 3  -JG    To walk in hospital room? 3 (P)   -AB 3  -JG    AM-PAC 6 Clicks Score (PT) 19 (P)   -AB 19  -JG    Highest level of mobility 6 --> Walked 10 steps or more (P)   -AB 6 --> Walked 10 steps or more  -JG    Row Name 08/01/22 1210          Modified Sree Scale    Pre-Stroke Modified Bucks Scale 6 - Unable to determine (UTD) from the medical record documentation (P)   -AB     Modified Bucks Scale 1 - No significant disability despite symptoms.  Able to carry out all usual duties and activities. (P)   -AB           User Key  (r) = Recorded By, (t) = Taken By, (c) = Cosigned By    Initials Name Provider Type    Fatimah Black, RN Registered Nurse    Jackie Salter, PT Student PT Student                             Physical Therapy Education                 Title: PT OT SLP Therapies (In Progress)     Topic: Physical Therapy (Done)     Point: Mobility training (Done)     Learning Progress Summary           Patient Acceptance, E,D, VU,NR by AB at 8/1/2022 1211      Show all documentation for this point (2)                 Point: Home exercise program (Done)     Learning Progress Summary           Patient Acceptance, E, VU,NR by KG at 7/29/2022 1137                   Point: Body mechanics (Done)     Learning Progress Summary           Patient Acceptance, E,D, VU,NR by AB at 8/1/2022 1211      Show all documentation for this point (2)                 Point: Precautions (Done)     Learning Progress Summary           Patient  Acceptance, E,D, VU,NR by AB at 8/1/2022 1211      Show all documentation for this point (2)                             User Key     Initials Effective Dates Name Provider Type Discipline    KG 05/22/20 -  Stefani Ventura, PT Physical Therapist PT    AB 05/23/22 -  Jackie Tirado, PT Student PT Student PT              PT Recommendation and Plan  Planned Therapy Interventions (PT): (P) balance training, bed mobility training, gait training, home exercise program, patient/family education, postural re-education, stair training, strengthening, transfer training  Plan of Care Reviewed With: (P) patient  Progress: (P) no change  Outcome Evaluation: (P) Pt is continuing to progress towards PT mobility goals. Pt ambulated 60' with SBA and BUE support, cues required for safety awareness as pt is highly impulsive. Further mobility was deferred by pt secondary to abdominal pain and headache. PT mobility goals remain appropriate with progress slower than expected secondary to s/p angiogram 7/31. Pt's primary limitations are poor motivation, impaired safety awareness, and increased pain levels. Pt will continue to benefit from skilled IPPT. PT rec d/c home with assist.     Time Calculation:    PT Charges     Row Name 08/01/22 1212             Time Calculation    PT Received On 08/01/22 (P)   -AB      PT Goal Re-Cert Due Date 08/06/22 (P)   -AB              Time Calculation- PT    Total Timed Code Minutes- PT 30 minute(s) (P)   -AB              Timed Charges    46937 - PT Therapeutic Activity Minutes 30 (P)   -AB              Untimed Charges    PT Eval/Re-eval Minutes 25 (P)   -AB              Total Minutes    Timed Charges Total Minutes 30 (P)   -AB      Untimed Charges Total Minutes 25 (P)   -AB       Total Minutes 55 (P)   -AB            User Key  (r) = Recorded By, (t) = Taken By, (c) = Cosigned By    Initials Name Provider Type    AB Jackie Tirado, PT Student PT Student              Therapy Charges for Today      Code Description Service Date Service Provider Modifiers Qty    [id number]  PT THERAPEUTIC ACT EA 15 MIN 8/1/2022 Jackie Tirado, PT Student GP 2    [id number]  PT RE-EVAL ESTABLISHED PLAN 2 8/1/2022 Jackie Tirado, PT Student GP 1          PT G-Codes  Outcome Measure Options: AM-PAC 6 Clicks Basic Mobility (PT)  AM-PAC 6 Clicks Score (PT): (P) 19  AM-PAC 6 Clicks Score (OT): 20  Modified Sree Scale: (P) 1 - No significant disability despite symptoms.  Able to carry out all usual duties and activities.    JACKIE TIRADO, PT Student  8/1/2022

## 2022-08-01 NOTE — PLAN OF CARE
Goal Outcome Evaluation:  Plan of Care Reviewed With: patient        Progress: improving  Outcome Evaluation: NIH 0. R fem site soft, with dressing C/D/I. C/O of HA which improves with PRN meds. Report nausea this morning. A.M. CT angio completed.

## 2022-08-01 NOTE — PROGRESS NOTES
"                    Clinical Nutrition       Patient Name: Mary Celis  YOB: 1975  MRN: [id number]  Date of Encounter: 22 15:36 EDT  Admission date: 2022      Reason for Visit   CAMILA ALFORD      EMR  Reviewed   Yes    Height: Height: 170 cm (66.93\")  Weight: Weight: 96.1 kg (211 lb 13.8 oz) (22 0330)  BMI: BMI (Calculated): 33.3    Problem:    SAH     Iron deficiency anemia due to chronic blood loss    Essential hypertension    Smoker    Class 1 obesity in adult    Illicit drug use (UDS + meth/amphetamines, opiates, and oxycodone)     Weight:  Weight Weight (kg) Weight (lbs) Weight Method   2022 96.1 kg 211 lb 13.8 oz Bed scale   2022 91.4 kg 201 lb 8 oz Bed scale   2022 93.5 kg 206 lb 2.1 oz Bed scale   2022 91.173 kg 201 lb -   2022 91.173 kg 201 lb -   2022 91.2 kg 201 lb 1 oz Bed scale        Reported/Observed/Food/Nutrition Related - Comments   RN reports pt taken to cath lab yesterday and had coiling of basilar artery. TCDs somewhat increased w/ mild vasospasm noted, 3% saline initiated w/ goal Na+ 145-155 mmol/L. RN also notes KIM arm is red and warm.    Pt reports nausea has improved w/ better control of HA pain; she notes constipation is problematic, not too hungry but eating.     Current Nutrition Prescription     Diet Regular    Average Intake from [employer]% of 3 meals reviewed since adm. NPO x 4 meals no documentation noted x 4 days.    Nutrition Diagnosis       Problem No nutrition diagnosis at this time   Etiology    Signs/Symptoms    Status:    Actions     Follow treatment progress, Care plan reviewed, Menu provided, Menu adjusted, Encourage intake    Monitor Per Protocol      Snea Toribio, MS,RD,LD,   Time Spent: 20 mins        "

## 2022-08-01 NOTE — PLAN OF CARE
Goal Outcome Evaluation:  Plan of Care Reviewed With: (P) patient        Progress: (P) no change  Outcome Evaluation: (P) Pt is continuing to progress towards PT mobility goals. Pt ambulated 60' with SBA and BUE support, cues required for safety awareness as pt is highly impulsive. Further mobility was deferred by pt secondary to abdominal pain and headache. PT mobility goals remain appropriate with progress slower than expected secondary to s/p angiogram 7/31. Pt's primary limitations are poor motivation, impaired safety awareness, and increased pain levels. Pt will continue to benefit from skilled IPPT. PT rec d/c home with assist.

## 2022-08-01 NOTE — PLAN OF CARE
Goal Outcome Evaluation:  SLP treatment completed. Will continue to address cognitive communication. Please see note for further details and recommendations.

## 2022-08-01 NOTE — PAYOR COMM NOTE
"Ref # [id number]  Jackie Burrows RN, BSN, CMGT-BC  Phone # [phone]  Fax # [phone]  Mary Menezes (46 y.o. Female)             Date of Birth   1975    Social Security Number       Address   [address] [place] [address]    Home Phone   [phone]    MRN   [id number]       Lutheran   None    Marital Status                               Admission Date   7/25/22    Admission Type   Urgent    Admitting Provider   Magdiel Walker MD    Attending Provider   Magdiel Walker MD    Department, Room/Bed   [facility] 2B ICU, N236/1       Discharge Date       Discharge Disposition       Discharge Destination                               Attending Provider: Magdiel Walker MD    Allergies: No Known Allergies    Isolation: None   Infection: None   Code Status: CPR   Advance Care Planning Activity    Ht: 170 cm (66.93\")   Wt: 96.1 kg (211 lb 13.8 oz)    Admission Cmt: None   Principal Problem: SAH  [I60.9]                 Active Insurance as of 7/25/2022     Primary Coverage     Payor Plan Insurance Group Employer/Plan Group    [employer] PPO [id number]     Payor Plan Address Payor Plan Phone Number Payor Plan Fax Number Effective Dates    [id number] [phone]  1/1/2015 - None Entered    [place] [id number]       Subscriber Name Subscriber Birth Date Member ID       LAYA MENEZES 1975 [id number]                     Operative/Procedure Notes (last 48 hours)  Notes from 07/30/22 1741 through 08/01/22 1741   No notes of this type exist for this encounter.          Physician Progress Notes (last 72 hours)      Mary Batista DO at 08/01/22 1345          INTENSIVIST   PROGRESS NOTE     Hospital:  LOS: 7 days     Ms. Mary Menezes, 46 y.o. female is followed for a Chief Complaint of: SAH, Headache      Subjective   S     Interval History:  No acute events. Complaining of headache this AM.        The patient's relevant past " medical, surgical and social history were reviewed and updated in Epic as appropriate.      ROS:   Constitutional: Negative for fever.   Respiratory: Negative for dyspnea.   Cardiovascular: Negative for chest pain.   Gastrointestinal: Negative for  nausea, vomiting and diarrhea.     Objective   O     Vitals:  Temp  Min: 98.1 °F (36.7 °C)  Max: 98.6 °F (37 °C)  BP  Min: 137/53  Max: 182/79  Pulse  Min: 49  Max: 63  Resp  Min: 14  Max: 18  SpO2  Min: 94 %  Max: 100 % No data recorded    Intake/Ouptut 24 hrs (7:00AM - 6:59 AM)  Intake & Output (last 3 days)       07/29 0701 07/30 0700 07/30 0701 07/31 0700 07/31 0701 08/01 0700 08/01 0701 08/02 0700    P.O. 330 1075 1090 200    I.V. (mL/kg) 2290.4 (24.5) 2091.8 (22.9) 1895.8 (19.7) 564.4 (5.9)    IV Piggyback   143     Total Intake(mL/kg) 2620.4 (28) 3166.8 (34.6) 3128.8 (32.6) 764.4 (8)    Urine (mL/kg/hr) 4000 (1.8) 3025 (1.4) 3200 (1.4) 875 (1.3)    Stool 0       Total Output 4000 3025 3200 875    Net -1379.6 +141.8 -71.2 -110.6            Urine Unmeasured Occurrence 3 x 3 x      Stool Unmeasured Occurrence 1 x             Medications (drips):  niCARdipine, Last Rate: Stopped (07/31/22 1439)  sodium chloride, Last Rate: 50 mL/hr (08/01/22 0814)  sodium chloride, Last Rate: 50 mL/hr (08/01/22 0314)          Physical Examination  Telemetry:  Normal sinus rhythm.    Constitutional:  No acute distress.  Resting in bed in the dark.    Eyes: No scleral icterus.   PERRL, EOM intact.    Neck:  Supple, FROM   Cardiovascular: Normal rate, regular and rhythm. Normal heart sounds.  No murmurs, gallop or rub.   Respiratory: No respiratory distress. Normal respiratory effort.  Normal breath sounds  Clear to auscultation   Abdominal:  Soft. No masses. Nontender. No distension. No HSM.   Extremities: No digital cyanosis. No clubbing.  No peripheral edema.   Skin: No rashes, lesions or ulcers   Neurological:   Alert and Oriented to person, place, and time.              Results  from last 7 days   Lab Units 08/01/22  0501 07/25/22  1712   WBC 10*3/mm3 22.15* 19.54*   HEMOGLOBIN g/dL 10.4* 13.8   MCV fL 82.2 80.8   PLATELETS 10*3/mm3 437 473*     Results from last 7 days   Lab Units 08/01/22  1304 08/01/22  0501 08/01/22  0029 07/31/22  1830 07/31/22  1415 07/31/22  0505 07/30/22  2213   SODIUM mmol/L 138 138 137   < > 137 138 137   POTASSIUM mmol/L  --  4.6  --   --   --  4.3 4.3   CO2 mmol/L  --  26.0  --   --   --  28.0 24.0   CREATININE mg/dL  --  0.49*  --   --   --  0.48* 0.51*   GLUCOSE mg/dL  --  146*  --   --   --  133* 188*   MAGNESIUM mg/dL  --  2.1  --   --  1.9 2.7* 1.7   PHOSPHORUS mg/dL  --  3.2  --   --   --   --   --     < > = values in this interval not displayed.     Estimated Creatinine Clearance: 170.5 mL/min (A) (by C-G formula based on SCr of 0.49 mg/dL (L)).  Results from last 7 days   Lab Units 07/25/22  1712   ALK PHOS U/L 130*   BILIRUBIN mg/dL 0.5   ALT (SGPT) U/L 17   AST (SGOT) U/L 19             Images:  Imaging Results (Last 24 Hours)     Procedure Component Value Units Date/Time    CT Angiogram Head [[id number]] Resulted: 08/01/22 0511     Updated: 08/01/22 0522            Results: Reviewed.  I reviewed the patient's new laboratory and imaging results.  I independently reviewed the patient's new images.    Medications: Reviewed.    Assessment & Plan   A / P     Ms. Celis is a 47yo F who presented to [facility] for headache. Imaging revealed a large subarachnoid hemorrhage with intraventricular involvement but no evidence of aneurysm and was transferred to [facility] on 7/25/22 for neurosurgical evaluation. Cerebral angiogram on 7/26 was negative for AVM or aneurysm. UDS was positive for methamphetamines and opiates. She was started on Nimotop, Keppra and daily TCD. 3% Saline was started.     She was taken back to the cath lab by Dr. 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     Nutrition:   Diet Regular  Advance  Directives:   Code Status and Medical Interventions:   Ordered at: 22 1548     Level Of Support Discussed With:    Patient     Code Status (Patient has no pulse and is not breathing):    CPR (Attempt to Resuscitate)     Medical Interventions (Patient has pulse or is breathing):    Full Support       Active Hospital Problems    Diagnosis    • **SAH     • Smoker    • Class 1 obesity in adult    • Illicit drug use (UDS + meth/amphetamines, opiates, and oxycodone)     • Essential hypertension    • Iron deficiency anemia due to chronic blood loss        Assessment / Plan:    1. 3% hypertonic saline. Goal -155.   2. SBP goal .   3. Daily TCDs  4. Nimotop, Decadron and Keppra  5. ASA and Brilinta  6. SSI  7. LUE duplex.  8. AM labs    High level of risk due to:  severe exacerbation of chronic illness and illness with threat to life or bodily function.    Plan of care and goals reviewed during interdisciplinary rounds.  I discussed the patient's findings and my recommendations with patient and nursing staff        Mary Batista DO    Intensive Care Medicine and Pulmonary Medicine       Electronically signed by Mary Batista DO at 22 1351     Duran Willis MD at 22 0945          Subjective    This is a 46 y.o. female Hunt-Aaron 2 Holden 3 aneurysmal subarachnoid hemorrhage who is post-bleed day 7 post-op day 1 s/p coiling of a Basilar aneurysm.       Objective    Vitals:    22 0900   BP: 164/77   Pulse: 55   Resp:    Temp:    SpO2: 97%     Pulse Readings from Last 3 Encounters:   22 55   17 84   17 71     Systolic (24hrs), Avg:160 , Min:134 , Max:183     Diastolic (24hrs), Av, Min:53, Max:98    Temp (24hrs), Av °F (36.7 °C), Min:97.1 °F (36.2 °C), Max:98.6 °F (37 °C)    She reports improved head pain.  She still has some photophobia.  Cranial nerves II through XII are grossly intact.  She moves all 4 extremities to command with apparently full  strength.  She is oriented to person, place, and event.    Lab Results   Component Value Date     08/01/2022         Assessment/Plan  Diagnosis: Subarachnoid Hemorrhage    Continue ICU with subarachnoid hemorrhage protocol  Goal serum sodium is 145-155, continue with hypertonic saline  Transcranial Dopplers today  Continue aspirin and Brilinta      Electronically signed by Duran Willis MD at 08/01/22 0937     Adrienne Escobar, APRN at 07/31/22 1532          Critical Care Note     LOS: 6 days   Patient Care Team:  System, Provider Not In as PCP - Albaro Harrington MD as Consulting Physician (General Surgery)  Duran Willis MD as Consulting Physician (Neurosurgery)    Chief Complaint/Reason for visit:    Subarachnoid hemorrhage with intraventricular extension  Hypertension  Glycemic, Electrolyte, Respiratory, and Medical management    Subjective     Mary Celis is a 46 y.o. female smoker with a PMH of hysterectomy for bleeding, cholecystectomy, and gout who developed severe sudden headache on July 24 found to have a large SAH.     She initially went to [facility] where imaging revealed a large subarachnoid hemorrhage with intraventricular involvement but no evidence of aneurysm and was transferred to [facility] on July 25 for neurosurgical evaluation. Cerebral angiogram on July 26 revealed no AVM or aneurysm. UDS was positive for amphetamines, methamphetamines, opiates, and oxycodone. She was placed on Nimotop, Keppra, and daily TCD which did appear to exhibit mild vasospasm placed on 3% hypertonic saline. Also required Cardene and adjustments in anti-hypertensives for adequate BP control. PICC line was placed on 7/30 and does terminate in the right atrium.     COVID-19 testing on admission negative.     Interval History:   Continues to complain of a severe headache overnight not improved with narcotics. She was taken back to the cath lab by Dr. 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 Post-procedure she continues to complain of a headache, but it is overall improved. BP is elevated in the 170s. Her family is at bedside.     Review of Systems:   All systems were reviewed and negative except as noted in subjective.    Medical history, surgical history, social history, family history reviewed    Objective     Intake/Output:    Intake/Output Summary (Last 24 hours) at 7/31/2022 1247  Last data filed at 7/31/2022 1202  Gross per 24 hour   Intake 2568.1 ml   Output 2700 ml   Net -131.9 ml       Nutrition:  NPO Diet NPO Type: Strict NPO    Infusions:  sodium chloride, 40 mL/hr, Last Rate: 40 mL/hr (07/30/22 2341)      Telemetry: Sinus bradycardia           Vital Signs  Blood pressure 134/98, pulse 58, temperature 98 °F (36.7 °C), resp. rate 14, height 170 cm (66.93\"), weight 91.4 kg (201 lb 8 oz), last menstrual period 05/08/2018, SpO2 96 %, not currently breastfeeding.    Physical Exam:    GENERAL: Patient lying in bed and conversant. No acute distress.   HEENT:  Atraumatic. Neck supple. Trachea midline. PERRLA 4mm. EOM WNL. Tongue midline.     LUNGS:  Chest rise of normal depth and symmetric. Lungs clear to auscultation throughout all lung fields.    HEART:  S1S2 detected. No JVD. Regular rate and rhythm. NSR. No rub, murmur, or gallop.    ABDOMEN:  Soft, round, nondistended, and nontender. Bowel sounds normoactive in all quadrants. No HSM.    EXTREMITIES:  No clubbing, edema, or cyanosis. Peripheral pulses 2+ and regular. Capillary refill <3sec-. FELIPA PICC site and L radial arterial line are CDI.      NEURO/PSYCH:  A&O x4. Follows commands. Moves all extremities.      Interval:  (shift change)  1a. Level of Consciousness: 0-->Alert, keenly responsive  1b. LOC Questions: 0-->Answers both questions correctly  1c. LOC Commands: 0-->Performs both tasks correctly  2. Best Gaze: 0-->Normal  3. Visual: 0-->No visual loss  4. " Facial Palsy: 0-->Normal symmetrical movements  5a. Motor Arm, Left: 0-->No drift, limb holds 90 (or 45) degrees for full 10 secs  5b. Motor Arm, Right: 0-->No drift, limb holds 90 (or 45) degrees for full 10 secs  6a. Motor Leg, Left: 0-->No drift, leg holds 30 degree position for full 5 secs  6b. Motor Leg, Right: 0-->No drift, leg holds 30 degree position for full 5 secs  7. Limb Ataxia: 0-->Absent  8. Sensory: 0-->Normal, no sensory loss  9. Best Language: 0-->No aphasia, normal  10. Dysarthria: 0-->Normal  11. Extinction and Inattention (formerly Neglect): 0-->No abnormality    Total (NIH Stroke Scale): 0    Results Review:    I reviewed the patient's new clinical results.   Results from last 7 days   Lab Units 07/31/22  0505 07/30/22  2213 07/30/22  1752 07/30/22  0547 07/30/22  0007 07/28/22  1044 07/25/22  1712   SODIUM mmol/L 138 137 133*   < > 133*   < > 137   POTASSIUM mmol/L 4.3 4.3  --   --  4.2   < > 4.5   CHLORIDE mmol/L 103 102  --   --  100   < > 99   CO2 mmol/L 28.0 24.0  --   --  25.0   < > 26.0   BUN mg/dL 16 20  --   --  16   < > 13   CREATININE mg/dL 0.48* 0.51*  --   --  0.71   < > 0.65   CALCIUM mg/dL 8.1* 8.6  --   --  8.5*   < > 9.9   BILIRUBIN mg/dL  --   --   --   --   --   --  0.5   ALK PHOS U/L  --   --   --   --   --   --  130*   ALT (SGPT) U/L  --   --   --   --   --   --  17   AST (SGOT) U/L  --   --   --   --   --   --  19   GLUCOSE mg/dL 133* 188*  --   --  188*   < > 188*    < > = values in this interval not displayed.     Results from last 7 days   Lab Units 07/25/22  1712   WBC 10*3/mm3 19.54*   HEMOGLOBIN g/dL 13.8   HEMATOCRIT % 44.2   PLATELETS 10*3/mm3 473*         No results found for: BLOODCX  No results found for: URINECX    I reviewed the patient's new imaging including images and reports.      All medications reviewed.   [MAR Hold] dexamethasone, 4 mg, Intravenous, Q6H  [MAR Hold] insulin lispro, 0-7 Units, Subcutaneous, TID AC  levETIRAcetam, 500 mg, Oral,  BID  lisinopril, 5 mg, Oral, Q24H  [MAR Hold] nicotine, 1 patch, Transdermal, Q24H  niMODipine, 60 mg, Oral, Q4H  [MAR Hold] pantoprazole, 40 mg, Oral, Q AM  [MAR Hold] senna-docusate sodium, 2 tablet, Oral, BID  [MAR Hold] sodium chloride, 10 mL, Intravenous, Q12H  [MAR Hold] sodium chloride, 10 mL, Intravenous, Q12H  [MAR Hold] sodium chloride, 10 mL, Intravenous, Q12H  [MAR Hold] sodium chloride, 2 g, Oral, TID With Meals  ticagrelor, 180 mg, Oral, Once      Assessment & Plan       Iron deficiency anemia due to chronic blood loss    SAH (subarachnoid hemorrhage) (HCC)    Essential hypertension    Mary Celis is a 46 y.o. female admitted with SAH without AVM or aneurysm appreciated on cerebral angiogram. Significant amount of blood noted in the subarachnoid space extending into the ventricles. TCD did show mild vasospasm and she was placed on hypertonic saline. Has required adjustments in BP meds to avoid hypertension. UDS + amphetamines/meth/opiates/oxycodone. Due to ongoing complaints of a headache she was taken to the cath lab by Dr. 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     PLAN:   1. Neurovascular checks per protocol  2. SBP goal of  per Dr. Willis; continue lisinopril; Cardene as needed   3. 3% hypertonic saline and sodium tablets with serial sodiums  4. TCDs in the setting of cerebral vasospasm  5. Nimotop, decadron, and Keppra   6. Correction insulin as needed   7. Nicotine replacement therapy   8. Pain meds as needed  9. Stool softeners  10. If has significant ectopy retract PICC line 5-7 cm and re do CXR   11. AM labs     VTE Prophylaxis: SCDs    Stress Ulcer Prophylaxis: PPI    Electronically signed by SELVIN Corbin, 07/31/22, 3:27 PM EDT.    Plan of care and goals reviewed during interdisciplinary rounds.  I discussed the patient's findings and my recommendations with nursing staff     Level of Risk High due  to:  severe exacerbation of chronic illness, illness with threat to life or bodily function and drugs requiring intensive monitoring for toxicity          Electronically signed by Adrienne Escobar APRN at 22 1533     Duran Willis MD at 22 0829          Chief complaint: Subarachnoid hemorrhage    Admit Diagnosis:   SAH (subarachnoid hemorrhage) (HCC) [I60.9]     Subjective: No events overnight.  Still complaining of severe headache    Objective:    Vitals:    22 0621   BP: 158/75   Pulse: (!) 47   Resp:    Temp:    SpO2:      Pulse  Av.9  Min: 45  Max: 66  Systolic (24hrs), Av , Min:113 , Max:166     Diastolic (24hrs), Av, Min:56, Max:109    Temp (24hrs), Av.7 °F (37.1 °C), Min:98.5 °F (36.9 °C), Max:98.9 °F (37.2 °C)      She is awake, alert, conversant, and appropriate.  She has no pronator drift.  She is still complaining of severe headache.    Lab Results   Component Value Date     2022       A/P:   Admit Diagnosis:   SAH (subarachnoid hemorrhage) (HCC) [I60.9]     Informed consent was once again obtained for a diagnostic cerebral angiogram under anesthesia with possible embolization of intracranial aneurysm.     Electronically signed by Duran Willis MD at 22 0830     Duran Willis MD at 22 1950        I was called by the patient's nurse for an episode of expressive aphasia which resolved spontaneously. Her symptoms were severe and persistent enough for me to order another head CT with a CTA    Upon reviewing her CTA, I am concerned that there is a small area of irregularity at the basilar apex which was not present on her initial CTA nor was it present on her diagnostic angiogram.  This may represent a small aneurysm.    I spoke to the patient and her daughter at the bedside and the patient gave me additional retrospective history that several months ago she had an episode of a sudden onset, severe headache which  lasted for several weeks.  She did not pursue medical treatment for this condition.  This also sounds concerning for a sentinel hemorrhage.    I obtained informed consent from the patient and from her daughter at the bedside for a diagnostic cerebral angiogram which will be performed under anesthesia tomorrow morning.  Risks and benefits of coiling of a possible basilar apex aneurysm were discussed with the patient and her daughter.  All questions were answered.  No guarantees were given or implied.    We will keep her in the intensive care unit but at this point I would like to keep her systolic blood pressure 90-1 60.  She is still exhibiting some signs of vasospasm so we are in a somewhat of a therapeutic dilemma with regards to permissive hypertension for treating her vasospasm and for trying to mitigate the risk of further aneurysm bleeding.    The patient and her family appear to understand the complex medical decision making at work and consent to proceed with the treatment plan as of detailed above.    Electronically signed by Duran Willis MD at 07/30/22 1952     Lali Villegas MD at 07/30/22 1624          Critical Care Note     LOS: 5 days   Patient Care Team:  System, Provider Not In as PCP - General  Albaro Lopez MD as Consulting Physician (General Surgery)  Duran Willis MD as Consulting Physician (Neurosurgery)    Chief Complaint/Reason for visit:    Subarachnoid hemorrhage with intraventricular extension  Hypertension      Subjective     46-year-old woman smoker with a history of hysterectomy for bleeding, cholecystectomy, gout who developed severe sudden headache on July 24.  She went to [facility] where imaging revealed a large subarachnoid hemorrhage with intraventricular involvement but no evidence of aneurysm.  She was transferred to [facility] on July 25.  Cerebral angiogram done on July 26 revealed no AVM or aneurysm.  She was  "placed on 1.5% hypertonic saline.    Interval History:     She required some hydralazine, enalapril overnight to maintain systolic blood pressure less than 140.  She continues to complain of severe headache unrelieved by Dilaudid.  PICC line placed today for IV access.    Review of Systems:    All systems were reviewed and negative except as noted in subjective.    Medical history, surgical history, social history, family history reviewed    Objective     Intake/Output:    Intake/Output Summary (Last 24 hours) at 7/30/2022 1624  Last data filed at 7/30/2022 1211  Gross per 24 hour   Intake 2503.47 ml   Output 2375 ml   Net 128.47 ml       Nutrition:  Diet Regular    Infusions:  sodium chloride, 50 mL/hr, Last Rate: 50 mL/hr (07/30/22 1211)  sodium chloride, 50 mL/hr, Last Rate: 50 mL/hr (07/30/22 0704)        Mechanical Ventilator Settings:                                                Telemetry: Sinus bradycardia             Vital Signs  Blood pressure 139/63, pulse 54, temperature 98.3 °F (36.8 °C), temperature source Oral, resp. rate 18, height 170 cm (66.93\"), weight 73.5 kg (162 lb 0.6 oz), last menstrual period 05/08/2018, SpO2 98 %, not currently breastfeeding.    Physical Exam:  General Appearance:   Middle-aged woman appearing uncomfortable laying on her left side   Head:   No evidence of trauma   Eyes:          Photophobia   Ears:     Throat:  Oral mucosa moist   Neck:  Trachea midline, no palpable thyroid, decreased mobility   Back:      Lungs:    Symmetric chest expansion, occasional rhonchi, no wheezing    Heart:   Slow rate, regular, S1, S2 auscultated   Abdomen:    Bowel sounds present, soft, nontender   Rectal:   Deferred   Extremities:  No pretibial edema or cyanosis   Pulses:  Pedal pulses present   Skin:  Warm and dry without rash   Lymph nodes:  No cervical adenopathy   Neurologic:  She can answer all orientation questions.  She follows commands with all extremities.  She does not have weakness. "  Speech is fluent    Interval:  (handoff)  1a. Level of Consciousness: 0-->Alert, keenly responsive  1b. LOC Questions: 0-->Answers both questions correctly  1c. LOC Commands: 0-->Performs both tasks correctly  2. Best Gaze: 0-->Normal  3. Visual: 0-->No visual loss  4. Facial Palsy: 0-->Normal symmetrical movements  5a. Motor Arm, Left: 0-->No drift, limb holds 90 (or 45) degrees for full 10 secs  5b. Motor Arm, Right: 0-->No drift, limb holds 90 (or 45) degrees for full 10 secs  6a. Motor Leg, Left: 0-->No drift, leg holds 30 degree position for full 5 secs  6b. Motor Leg, Right: 0-->No drift, leg holds 30 degree position for full 5 secs  7. Limb Ataxia: 0-->Absent  8. Sensory: 0-->Normal, no sensory loss  9. Best Language: 0-->No aphasia, normal  10. Dysarthria: 0-->Normal  11. Extinction and Inattention (formerly Neglect): 0-->No abnormality    Total (NIH Stroke Scale): 0  Results Review:     I reviewed the patient's new clinical results.   Results from last 7 days   Lab Units 07/30/22  1211 07/30/22  0547 07/30/22  0007 07/29/22  1822 07/29/22  1458 07/29/22  0618 07/28/22  1044 07/25/22  1712   SODIUM mmol/L 136 132* 133*   < > 135* 133*   < > 137   POTASSIUM mmol/L  --   --  4.2  --  4.4 4.9   < > 4.5   CHLORIDE mmol/L  --   --  100  --  100 99   < > 99   CO2 mmol/L  --   --  25.0  --  23.0 24.0   < > 26.0   BUN mg/dL  --   --  16  --  16 14   < > 13   CREATININE mg/dL  --   --  0.71  --  0.66 0.55*   < > 0.65   CALCIUM mg/dL  --   --  8.5*  --  8.9 9.3   < > 9.9   BILIRUBIN mg/dL  --   --   --   --   --   --   --  0.5   ALK PHOS U/L  --   --   --   --   --   --   --  130*   ALT (SGPT) U/L  --   --   --   --   --   --   --  17   AST (SGOT) U/L  --   --   --   --   --   --   --  19   GLUCOSE mg/dL  --   --  188*  --  154* 117*   < > 188*    < > = values in this interval not displayed.     Results from last 7 days   Lab Units 07/25/22  1712   WBC 10*3/mm3 19.54*   HEMOGLOBIN g/dL 13.8   HEMATOCRIT % 44.2    PLATELETS 10*3/mm3 473*         No results found for: BLOODCX  No results found for: URINECX    I reviewed the patient's new imaging including images and reports.      All medications reviewed.   dexamethasone, 4 mg, Intravenous, Q6H  insulin lispro, 0-7 Units, Subcutaneous, TID AC  lisinopril, 5 mg, Oral, Q24H  nicotine, 1 patch, Transdermal, Q24H  niMODipine, 60 mg, Oral, Q4H  pantoprazole, 40 mg, Oral, Q AM  senna-docusate sodium, 2 tablet, Oral, BID  sodium chloride, 10 mL, Intravenous, Q12H  sodium chloride, 10 mL, Intravenous, Q12H  sodium chloride, 10 mL, Intravenous, Q12H  sodium chloride, 2 g, Oral, TID With Meals          Assessment & Plan       Iron deficiency anemia due to chronic blood loss    SAH (subarachnoid hemorrhage) (HCC)    Essential hypertension    Unfortunate 46-year-old woman who has suffered a subarachnoid hemorrhage.  No AVM or aneurysm identified.  She has significant blood in her subarachnoid space extending into her ventricles.  She is neurologically intact.  She is receiving 1.5% saline.  Sodiums were still less than 140 and she was changed to 3% saline.      PLAN:  -Allow blood pressure to increase up to 220 before treating with as needed medication  -Lisinopril 5 mg daily  -New motor pain 60 mg every 4 hours  -3% saline  -Nutrition supplements  -Percocet, Dilaudid for headache  -Start stool softeners    VTE Prophylaxis: compression stockings    Stress Ulcer Prophylaxis: none    Patient needs ongoing monitoring and care as she has potential life-threatening risk of vasospasm and acute neurologic deterioration    Lali Villegas MD  07/30/22  16:24 EDT      Time: Critical care 25 min  I personally provided care to this critically ill patient as documented above.  Critical care time does not include time spent on separately billed procedures.  None of my critical care time was concurrent with other critical care providers.     Electronically signed by Lali Villegas MD  "at 07/30/22 1632     Laisha Siddiqui PA-C at 07/30/22 1116     Attestation signed by Duran Willis MD at 07/30/22 1219    I have reviewed this documentation and agree.      SAH protocol. Permissive HTN  Daily TCD  Drive sodium and avoid hypovolemia                NEUROSURGERY PROGRESS NOTE     LOS: 5 days   Patient Care Team:  System, Provider Not In as PCP - General  Albaro Lopez MD as Consulting Physician (General Surgery)  Duran Willis MD as Consulting Physician (Neurosurgery)    Chief Complaint: headache  SAH (subarachnoid hemorrhage) ([facility]) [I60.9]     Interval History:   Patient continues to complain of headache that is poorly controlled.  She denies nausea or vomiting.  Patient is on hypertonic saline at 1.5%, but sodium remains 133/132. TCD numbers have been trending upward. Patient has had some episodes of hypertension which are still being treated with parameters of <140. She is participating with PT and eating/drinking. Voiding spontaneously    Vital Signs: Blood pressure 130/81, pulse 52, temperature 98.3 °F (36.8 °C), temperature source Oral, resp. rate 18, height 170 cm (66.93\"), weight 73.5 kg (162 lb 0.6 oz), last menstrual period 05/08/2018, SpO2 97 %, not currently breastfeeding.  Intake/Output:     Intake/Output Summary (Last 24 hours) at 7/30/2022 1116  Last data filed at 7/30/2022 0800  Gross per 24 hour   Intake 2688.88 ml   Output 3625 ml   Net -936.12 ml       Physical Exam:  Resting in dark room, awakens to voice. Complains of headache. Cranial nerves grossly intact  Speech clear and appropriate, moving all extremities      Data Review:    Component   Ref Range & Units 05:47   (7/30/22) 00:07   (7/30/22) 1 d ago   (7/29/22) 1 d ago   (7/29/22) 1 d ago   (7/29/22) 2 d ago   (7/28/22) 5 d ago   (7/25/22)   Sodium   136 - 145 mmol/L 132 Low   133 Low   132 Low   135 Low   133 Low   135 Low   137    Resulting Agency [facility] [facility]       Interpretation Summary    Mean velocities in the left MCA are increased at 162 cm/s, a modest increase compared with yesterday's study.  This suggests mild spasm in that artery     Mean velocities on the right MCA remain mildly increased at 123 cm meters per second, roughly stable with yesterday's study.  This suggests mild spasm in the artery      Assessment/Plan:  SAH (subarachnoid hemorrhage) (HCC) [I60.9], apparent nonaneurysmal. CTA planned for next week.   In vasospasm window, sodium goal 140.   Start 3% hypertonic saline at 50ml/hour today once PICC line is placed.   Continue q6hr sodium draws  Permissive hypertension: goal .   Continue ICU, CTA to be repeated on Monday  Headache: will increase Percocet to 7.5. Already getting this q4 and dilaudid q4. No other changes      Laisha Siddiqui PA-C  07/30/22  11:16 EDT      Electronically signed by Duran Willis MD at 07/30/22 1219     Magdiel Walker MD at 07/29/22 7340          Pulmonary/Critical Care History and Physical Exam     LOS: 4 days   Patient Care Team:  System, Provider Not In as PCP - General  Albaro Lopez MD as Consulting Physician (General Surgery)  Duran Willis MD as Consulting Physician (Neurosurgery)    Chief Complaint: Headache    Subjective     HPI: 46 y.o. female smoker of 1/2 pack/day for 30 years with history of anemia, hysterectomy apparently for bleeding, gout, cholecystectomy, not on any home medications, who developed severe headache the evening of 7/24/2022.  She apparently called her  who ultimately took her to [facility] at about 1 AM.  She reportedly underwent a CT scan of the head/CT angiogram which showed a large subarachnoid hemorrhage with intraventricular involvement but no evidence of aneurysm.  Neurosurgery was called here and apparently accepted the patient in transfer.    Patient's family reports she was minimally responsive overnight but is  currently more responsive.  The patient is awake and alert and able to relay a history.  She reports ongoing 10 out of 10 headache that is worse when she moves.    Cerebral angiography done on 7/26/2022 with no evidence of AVM or aneurysm to account for subarachnoid hemorrhage.    Interval history: Ongoing headache.  No fevers or chills.  No current nausea/vomiting.  No weakness.    History taken from: patient, family    Past Medical History:   Diagnosis Date   • Abnormal Pap smear of cervix    • Anemia    • Arthritis    • Arthritis    • Body piercing     EARS   • Cancer (HCC)     REPORTS PRE-CERVICAL CANCER   • Depression with anxiety    • Gout    • History of blood transfusion    • History of transfusion     REPORTS HAS HAD SEVERAL TRANSFUSIONS, NO REACTIONS   • IBS (irritable bowel syndrome)    • Panic attack    • Tattoo     LEFT FOREARM   • Wears glasses        Past Surgical History:   Procedure Laterality Date   • CEREBRAL ANGIOGRAM N/A 7/26/2022    Procedure: Cerebral angiogram;  Surgeon: Spencer Hauser MD;  Location: [facility] CATH INVASIVE LOCATION;  Service: Interventional Radiology;  Laterality: N/A;   • CHOLECYSTECTOMY     • CHOLECYSTECTOMY WITH INTRAOPERATIVE CHOLANGIOGRAM N/A 8/28/2017    Procedure: CHOLECYSTECTOMY LAPAROSCOPIC INTRAOPERATIVE CHOLANGIOGRAM;  Surgeon: Albaro Lopez MD;  Location: [facility] OR;  Service:    • COLONOSCOPY N/A 7/28/2017    Procedure: COLONOSCOPY;  Surgeon: Albaro Lopez MD;  Location: [facility] ENDOSCOPY;  Service:    • D & C HYSTEROSCOPY      VITAL SIGNS UNSTABLE WITH THIS PROCEDURE   • ENDOSCOPY N/A 7/28/2017    Procedure: ESOPHAGOGASTRODUODENOSCOPY WITH BIOPSIES;  Surgeon: Albaro Lopez MD;  Location: [facility] ENDOSCOPY;  Service:    • TUBAL ABDOMINAL LIGATION     • WISDOM TOOTH EXTRACTION         Family History   Problem Relation Age of Onset   • Diabetes Mother    • Diabetes Father    • Diabetes Maternal Grandmother    • Heart disease Maternal Grandmother    • Heart  disease Paternal Grandmother    • Colon cancer Maternal Grandfather        Social History     Socioeconomic History   • Marital status:    Tobacco Use   • Smoking status: Current Every Day Smoker     Packs/day: 1.00     Years: 25.00     Pack years: 25.00     Types: Cigarettes   • Smokeless tobacco: Never Used   Substance and Sexual Activity   • Alcohol use: Yes     Comment: SOCIAL USE, NO ABUSE REPORTED   • Drug use: Yes     Types: Marijuana   • Sexual activity: Yes     Partners: Male     Birth control/protection: None       No Known Allergies    Past Medical History/Family History/Social History were reviewed by me and updates were made.     Review of Systems:    Review of 14 systems was completed with positives and pertinent negatives noted in the subjective section.  All other systems reviewed and are negative.         Objective     Vital Signs  Temp:  [97.5 °F (36.4 °C)-98.3 °F (36.8 °C)] 98.3 °F (36.8 °C)  Heart Rate:  [47-67] 53  Resp:  [14-20] 18  BP: (109-157)/() 153/82  Body mass index is 31.55 kg/m².       Intake/Output Summary (Last 24 hours) at 7/29/2022 2135  Last data filed at 7/29/2022 2029  Gross per 24 hour   Intake 3436.33 ml   Output 2825 ml   Net 611.33 ml      Physical Exam:     General Appearance:    Alert, cooperative, in no acute distress   Head:    Normocephalic, without obvious abnormality, atraumatic   Eyes:            Lids and lashes normal, conjunctivae and sclerae normal,    no icterus, no pallor, corneas clear, PER   ENMT:   Ears appear intact with no abnormalities noted         Neck:   No adenopathy, supple, trachea midline, no thyromegaly,      no carotid bruit, no JVD   Lungs/resp:     Normal effort, symmetric chest rise, no crepitus, clear to      auscultation bilaterally, no chest wall tenderness                  Heart/CV:    Regular rhythm and normal rate, normal S1 and S2, no         murmur   Abdomen/GI:     Normal bowel sounds, no masses, no organomegaly, soft,     nontender, nondistended   G/U:     Deferred   Extremities/MSK:   No clubbing, cyanosis or edema.  Normal tone.  No deformities.    Pulses:   Pulses palpable and equal bilaterally   Skin:   No bleeding, bruising or rash   Hem/Lymph:   No cervical or supraclavicular adenopathy.    Neurologic:   Moves all extremities with no obvious focal motor deficit.  Cranial nerves 2 - 12 grossly intact            Psychiatric:  Normal mood and affect, oriented x 3.     The above findings are documentation of my personal physical exam findings from today.   Electronically signed by:  Magdiel Walker MD  07/29/22  21:35 EDT     Results Review:     I reviewed the patient's new clinical results.   Results from last 7 days   Lab Units 07/29/22  1822 07/29/22  1458 07/29/22  0618 07/28/22  1044 07/25/22  1712   SODIUM mmol/L 132* 135* 133* 135* 137   POTASSIUM mmol/L  --  4.4 4.9 4.1 4.5   CHLORIDE mmol/L  --  100 99 100 99   CO2 mmol/L  --  23.0 24.0 25.0 26.0   BUN mg/dL  --  16 14 13 13   CREATININE mg/dL  --  0.66 0.55* 0.62 0.65   CALCIUM mg/dL  --  8.9 9.3 9.2 9.9   BILIRUBIN mg/dL  --   --   --   --  0.5   ALK PHOS U/L  --   --   --   --  130*   ALT (SGPT) U/L  --   --   --   --  17   AST (SGOT) U/L  --   --   --   --  19   GLUCOSE mg/dL  --  154* 117* 152* 188*     Results from last 7 days   Lab Units 07/25/22  1712   WBC 10*3/mm3 19.54*   HEMOGLOBIN g/dL 13.8   HEMATOCRIT % 44.2   PLATELETS 10*3/mm3 473*           I reviewed the patient's new imaging including images and reports.    TCD 7/29/2022:  Mean velocities in the left mid MCA are now increased at 147 cm/s, suggesting mild spasm in that artery     Mean velocities in the right proximal MCA are now increased to 126 cm/s, suggesting mild spasm in that artery     These represent increases from the studies of the past 2 days        Medication Review:   dexamethasone, 4 mg, Intravenous, Q6H  [START ON 7/30/2022] insulin lispro, 0-7 Units, Subcutaneous, TID AC  lisinopril, 5  "mg, Oral, Q24H  nicotine, 1 patch, Transdermal, Q24H  niMODipine, 60 mg, Oral, Q4H  pantoprazole, 40 mg, Oral, Q AM  senna-docusate sodium, 2 tablet, Oral, BID  sodium chloride, 10 mL, Intravenous, Q12H  sodium chloride, 10 mL, Intravenous, Q12H  sodium chloride, 2 g, Oral, TID With Meals      sodium chloride, 50 mL/hr, Last Rate: 50 mL/hr (07/29/22 1930)  sodium chloride 1.5% (HYPERTONIC) 500 mL infusion, 50 mL/hr, Last Rate: 50 mL/hr (07/29/22 1930)        Assessment & Plan     Active Hospital Problems    Diagnosis    • SAH (subarachnoid hemorrhage) (HCC)    • Essential hypertension    • Iron deficiency anemia due to chronic blood loss      Added automatically from request for surgery [id number]       46 y.o. with history of iron deficiency anemia, not on any home medications, who developed a severe headache and was found to have a large subarachnoid hemorrhage with intraventricular extension.  She was transferred here from [facility] for neurosurgical evaluation.  She has had issues with hypertension requiring Cardene.     Cerebral angiogram done on 7/26/2022 showed no evidence of aneurysm, vascular malformation, changes of vasculitis/vasospasm, or other \"culprit\" lesion to account for patient's subarachnoid hemorrhage.    Urine drug screen positive for amphetamines, methamphetamines, and oxycodone.    I suspect hypertensive bleed secondary to poorly controlled hypertension and drug use as a cause for her SAH.    Continuing nimodipine for 21 days and starting daily transcranial Dopplers per neurosurgery.  Keep in ICU per neurosurgery.    Plan:  1.  For subarachnoid hemorrhage: Cardene as needed.  Cerebral angiogram negative for culprit lesion.  Nimodipine.  Daily TCD.  Appears to have some mild vasospasm on TCD today.  Discussed with neurosurgery.  They are starting hypertonic saline.  Neurosurgery following.  2.  For hypertension: Cardene for systolic blood pressure less than 140.  Continue " lisinopril 5 mg.  3.  DVT prophylaxis: SCDs.    Patient is critically ill secondary to subarachnoid hemorrhage with intraventricular extension has high risk of life-threatening decline in condition including respiratory failure, obstructive hydrocephalus.  She therefore requires continuous monitoring frequent reassessment for consideration of adjustment management to minimize this risk.  Personally reassessed the patient multiple times today.      Electronically signed by:     Magdiel Walker MD  07/29/22  21:35 EDT        • Please note that portions of this note were completed with [employer] - a voice recognition program.     Electronically signed by Magdiel Walker MD at 07/29/22 6786

## 2022-08-01 NOTE — THERAPY TREATMENT NOTE
Acute Care - Speech Language Pathology Treatment Note  [facility]     Patient Name: Mary Celis  : 1975  MRN: [id number]  Today's Date: 2022               Admit Date: 2022     Visit Dx:    ICD-10-CM ICD-9-CM   1. SAH (subarachnoid hemorrhage) (HCC)  I60.9 430   2. Essential hypertension  I10 401.9   3. Iron malabsorption  K90.9 579.8   4. Right upper quadrant abdominal pain  R10.11 789.01   5. Iron deficiency anemia due to chronic blood loss  D50.0 280.0   6. Acute nonintractable headache, unspecified headache type  R51.9 784.0   7. Cognitive communication deficit  R41.841 799.52     Patient Active Problem List   Diagnosis   • Iron deficiency anemia due to chronic blood loss   • Right upper quadrant abdominal pain   • Iron malabsorption   • SAH    • Essential hypertension   • Smoker   • Class 1 obesity in adult   • Illicit drug use (UDS + meth/amphetamines, opiates, and oxycodone)      Past Medical History:   Diagnosis Date   • Abnormal Pap smear of cervix    • Anemia    • Arthritis    • Arthritis    • Body piercing     EARS   • Cancer (HCC)     REPORTS PRE-CERVICAL CANCER   • Depression with anxiety    • Gout    • History of blood transfusion    • History of transfusion     REPORTS HAS HAD SEVERAL TRANSFUSIONS, NO REACTIONS   • IBS (irritable bowel syndrome)    • Panic attack    • Tattoo     LEFT FOREARM   • Wears glasses      Past Surgical History:   Procedure Laterality Date   • CEREBRAL ANGIOGRAM N/A 2022    Procedure: Cerebral angiogram;  Surgeon: Spencer Hauser MD;  Location: [facility] INVASIVE LOCATION;  Service: Interventional Radiology;  Laterality: N/A;   • CHOLECYSTECTOMY     • CHOLECYSTECTOMY WITH INTRAOPERATIVE CHOLANGIOGRAM N/A 2017    Procedure: CHOLECYSTECTOMY LAPAROSCOPIC INTRAOPERATIVE CHOLANGIOGRAM;  Surgeon: Albaro Lopez MD;  Location: [facility] OR;  Service:    • COLONOSCOPY N/A 2017    Procedure: COLONOSCOPY;  Surgeon: Albaro Lopez MD;  Location: [facility]  ENDOSCOPY;  Service:    • D & C HYSTEROSCOPY      VITAL SIGNS UNSTABLE WITH THIS PROCEDURE   • ENDOSCOPY N/A 7/28/2017    Procedure: ESOPHAGOGASTRODUODENOSCOPY WITH BIOPSIES;  Surgeon: Albaro Lopez MD;  Location: [facility] ENDOSCOPY;  Service:    • INTERVENTIONAL RADIOLOGY PROCEDURE Bilateral 7/31/2022    Procedure: CAROTID CEREBRAL ANGIOGRAM BILATERAL;  Surgeon: Duran Willis MD;  Location:  [facility] C[facility] INVASIVE [facility];  Service: Interventional Radiology;  Laterality: Bilateral;   • TUBAL ABDOMINAL LIGATION     • WISDOM TOOTH EXTRACTION         SLP Recommendation and Plan              Anticipated Discharge Disposition (SLP): unknown (08/01/22 1510)        Predicted Duration Therapy Intervention (Days): until discharge (08/01/22 1510)  Daily Summary of Progress (SLP): progress toward functional goals as expected (08/01/22 1510)           Treatment Assessment (SLP): Pt continues to present w/ cognitive linguistic deficits. Lethargy and attention deficits thought to be impacting accuracy. Difficult to assess reading and writing 2/2 lethargy. SLP will continue to f/u for higher level cognitive communication tx. (08/01/22 1510)  Plan for Continued Treatment (SLP): continue treatment per plan of care (08/01/22 1510)         SLP EVALUATION (last 72 hours)     SLP SLC Evaluation     Row Name 08/01/22 1510                   Communication Assessment/Intervention    Document Type therapy note (daily note)  -        Subjective Information no complaints  -        Patient Observations cooperative;lethargic  -        Patient/Family/Caregiver Comments/Observations daughter present  -        Patient Effort good  -        Symptoms Noted During/After Treatment none  -                  Pain    Additional Documentation Pain Scale: FACES Pre/Post-Treatment (Group)  -                  SLP Treatment Clinical Impressions    Treatment Assessment (SLP) Pt continues to present w/ cognitive linguistic deficits.  Lethargy and attention deficits thought to be impacting accuracy. Difficult to assess reading and writing 2/2 lethargy. SLP will continue to f/u for higher level cognitive communication tx.  -        Daily Summary of Progress (SLP) progress toward functional goals as expected  -        Plan for Continued Treatment (SLP) continue treatment per plan of care  -        Care Plan Review evaluation/treatment results reviewed  -        Care Plan Review, Other Participant(s) daughter  -                  Recommendations    Therapy Frequency (SLP SLC) 3 days per week  -        Predicted Duration Therapy Intervention (Days) until discharge  -        Anticipated Discharge Disposition (SLP) unknown  -                  Attention Goal 1 (SLP)    Comment (Attention Goal 1, SLP) Pt w/ eye closed majority of session, lethargy impacting but pt able to re-orient w/ verbal cue  -MH              User Key  (r) = Recorded By, (t) = Taken By, (c) = Cosigned By    Initials Name Effective Dates     Chelsie Hatch, MS, CFY-SLP 06/22/22 -                    EDUCATION  The patient has been educated in the following areas:     Cognitive Impairment Communication Impairment.           SLP GOALS     Row Name 08/01/22 1510             Attention Goal 1 (SLP)    Improve Attention by Goal 1 (SLP) looking at speaker;100%;independently (over 90% accuracy)  -MH      Time Frame (Attention Goal 1, SLP) short term goal (STG)  -MH      Progress (Attention Goal 1, SLP) 60%;independently (over 90% accuracy)  -      Progress/Outcomes (Attention Goal 1, SLP) continuing progress toward goal  -      Comment (Attention Goal 1, SLP) Pt w/ eye closed majority of session, lethargy impacting but pt able to re-orient w/ verbal cue  -              Memory Skills Goal 1 (SLP)    Improve Memory Skills Through Goal 1 (SLP) recalling unrelated word lists immediately;recalling unrelated word lists with an imposed delay;90%;independently (over 90%  accuracy)  -MH      Time Frame (Memory Skills Goal 1, SLP) short term goal (STG)  -MH      Progress (Memory Skills Goal 1, SLP) 50%;independently (over 90% accuracy)  -MH      Progress/Outcomes (Memory Skills Goal 1, SLP) continuing progress toward goal  -MH      Comment (Memory Skills Goal 1, SLP) Pt able to recall ~50% of unrelated word list immediately, no acc when prompted to recall w/ imposed delay  -MH              Organizational Skills Goal 1 (SLP)    Improve Thought Organization Through Goal 1 (SLP) completing a divergent naming task;completing a convergent naming task;completing a verbal sequencing task;90%;independently (over 90% accuracy)  -MH      Time Frame (Thought Organization Skills Goal 1, SLP) short term goal (STG)  -MH      Progress (Thought Organization Skills Goal 1, SLP) 70%;independently (over 90% accuracy)  -MH      Progress/Outcomes (Thought Organization Skills Goal 1, SLP) continuing progress toward goal  -MH      Comment (Thought Organization Skills Goal 1, SLP) Increased processing time  -MH              Pragmatic Skills Goal 1 (SLP)    Improve Pragmatic Skills Goal 1 (SLP) maintain topic;100%;independently (over 90% accuracy)  -MH      Time Frame (Pragmatic Skills Goal 1, SLP) short term goal (STG)  -MH      Progress (Pragmatic Skills Goal 1, SLP) 80%;independently (over 90% accuracy)  -MH      Progress/Outcomes (Pragmatic Skills Goal 1, SLP) continuing progress toward goal  -MH      Comment (Pragmatic Skills Goal 1, SLP) Re-assess when lethargy not impacting  -MH              Additional Goal 1 (SLP)    Additional Goal 1, SLP Pt will participate in dx/tx of reading writing to determine further needs.  -MH      Time Frame (Additional Goal 1, SLP) short term goal (STG)  -MH      Progress/Outcomes (Additional Goal 1, SLP) goal ongoing  -MH              Additional Goal 2 (SLP)    Additional Goal 2, SLP LTG: pt will improve cognitive-communication skills to participate in care w/ 100%  accuracy w/o cues  -MH      Time Frame (Additional Goal 2, SLP) by discharge  -MH      Progress/Outcomes (Additional Goal 2, SLP) continuing progress toward goal  -MH            User Key  (r) = Recorded By, (t) = Taken By, (c) = Cosigned By    Initials Name Provider Type    Chelsie Olguin MS, ERINN-SLP Speech and Language Pathologist                        Time Calculation:      Time Calculation- SLP     Row Name 08/01/22 1542             Time Calculation- SLP    SLP Start Time 1510  -      SLP Received On 08/01/22  -              Untimed Charges    [id number] Treatment/ST Modification Prosth Aug Alter  40  -MH              Total Minutes    Untimed Charges Total Minutes 40  -MH       Total Minutes 40  -MH            User Key  (r) = Recorded By, (t) = Taken By, (c) = Cosigned By    Initials Name Provider Type    Chelsie Olguin MS, ERINN-SLP Speech and Language Pathologist                Therapy Charges for Today     Code Description Service Date Service Provider Modifiers Qty    20648587606 HC ST TREATMENT SPEECH 3 8/1/2022 Chelsie Hatch MS, ERINN-SLP GN 1              Patient was not wearing a face mask and did not exhibit coughing during this therapy encounter.  Procedure performed was not aerosolizing, did not involve close contact (within 6 feet for at least 15 minutes or longer), and did not involve contact with infectious secretions or specimens.  Therapist used appropriate personal protective equipment including gloves, standard procedure mask and eye protection.  Appropriate PPE was worn during the entire therapy session.  Hand hygiene was completed before and after therapy session.         Chelsie Hatch MS, CFY-SLP  8/1/2022

## 2022-08-02 ENCOUNTER — APPOINTMENT (OUTPATIENT)
Dept: CARDIOLOGY | Facility: HOSPITAL | Age: 47
End: 2022-08-02

## 2022-08-02 LAB
ANION GAP SERPL CALCULATED.3IONS-SCNC: 9 MMOL/L (ref 5–15)
BH CV UPPER VENOUS LEFT AXILLARY AUGMENT: NORMAL
BH CV UPPER VENOUS LEFT AXILLARY COMPRESS: NORMAL
BH CV UPPER VENOUS LEFT AXILLARY PHASIC: NORMAL
BH CV UPPER VENOUS LEFT AXILLARY SPONT: NORMAL
BH CV UPPER VENOUS LEFT BASILIC FOREARM COMPRESS: NORMAL
BH CV UPPER VENOUS LEFT BASILIC UPPER COMPRESS: NORMAL
BH CV UPPER VENOUS LEFT BRACHIAL AUGMENT: NORMAL
BH CV UPPER VENOUS LEFT BRACHIAL COMPRESS: NORMAL
BH CV UPPER VENOUS LEFT BRACHIAL PHASIC: NORMAL
BH CV UPPER VENOUS LEFT BRACHIAL SPONT: NORMAL
BH CV UPPER VENOUS LEFT CEPHALIC FOREARM COMPRESS: NORMAL
BH CV UPPER VENOUS LEFT CEPHALIC UPPER COLOR: 1
BH CV UPPER VENOUS LEFT CEPHALIC UPPER COMPRESS: NORMAL
BH CV UPPER VENOUS LEFT INTERNAL JUGULAR AUGMENT: NORMAL
BH CV UPPER VENOUS LEFT INTERNAL JUGULAR COMPRESS: NORMAL
BH CV UPPER VENOUS LEFT INTERNAL JUGULAR PHASIC: NORMAL
BH CV UPPER VENOUS LEFT INTERNAL JUGULAR SPONT: NORMAL
BH CV UPPER VENOUS LEFT RADIAL COMPRESS: NORMAL
BH CV UPPER VENOUS LEFT SUBCLAVIAN AUGMENT: NORMAL
BH CV UPPER VENOUS LEFT SUBCLAVIAN COMPRESS: NORMAL
BH CV UPPER VENOUS LEFT SUBCLAVIAN PHASIC: NORMAL
BH CV UPPER VENOUS LEFT SUBCLAVIAN SPONT: NORMAL
BH CV UPPER VENOUS LEFT ULNAR COMPRESS: NORMAL
BH CV UPPER VENOUS RIGHT SUBCLAVIAN AUGMENT: NORMAL
BH CV UPPER VENOUS RIGHT SUBCLAVIAN COMPRESS: NORMAL
BH CV UPPER VENOUS RIGHT SUBCLAVIAN PHASIC: NORMAL
BH CV UPPER VENOUS RIGHT SUBCLAVIAN SPONT: NORMAL
BH CV VAS TCD LEFT ACA: 134 CM/SEC
BH CV VAS TCD LEFT DISTAL M1: 35 CM/SEC
BH CV VAS TCD LEFT MID M1: 194 CM/SEC
BH CV VAS TCD LEFT P1: 62 CM/SEC
BH CV VAS TCD LEFT P2: 56 CM/SEC
BH CV VAS TCD LEFT PROXIMAL M1: 180 CM/SEC
BH CV VAS TCD LEFT TERMINAL ICA: 110 CM/SEC
BH CV VAS TCD RIGHT ACA: 168 CM/SEC
BH CV VAS TCD RIGHT DISTAL M1: 65 CM/SEC
BH CV VAS TCD RIGHT MID M1: 73 CM/SEC
BH CV VAS TCD RIGHT P1: 16 CM/SEC
BH CV VAS TCD RIGHT P2: 26 CM/SEC
BH CV VAS TCD RIGHT PROXIMAL M1: 169 CM/SEC
BH CV VAS TCD RIGHT TERMINAL ICA: 74 CM/SEC
BUN SERPL-MCNC: 13 MG/DL (ref 6–20)
BUN/CREAT SERPL: 22.8 (ref 7–25)
CALCIUM SPEC-SCNC: 7.9 MG/DL (ref 8.6–10.5)
CHLORIDE SERPL-SCNC: 104 MMOL/L (ref 98–107)
CO2 SERPL-SCNC: 24 MMOL/L (ref 22–29)
CREAT SERPL-MCNC: 0.57 MG/DL (ref 0.57–1)
EGFRCR SERPLBLD CKD-EPI 2021: 113.7 ML/MIN/1.73
GLUCOSE BLDC GLUCOMTR-MCNC: 110 MG/DL (ref 70–130)
GLUCOSE BLDC GLUCOMTR-MCNC: 172 MG/DL (ref 70–130)
GLUCOSE BLDC GLUCOMTR-MCNC: 175 MG/DL (ref 70–130)
GLUCOSE BLDC GLUCOMTR-MCNC: 210 MG/DL (ref 70–130)
GLUCOSE SERPL-MCNC: 218 MG/DL (ref 65–99)
MAGNESIUM SERPL-MCNC: 1.8 MG/DL (ref 1.6–2.6)
MAXIMAL PREDICTED HEART RATE: 174 BPM
MAXIMAL PREDICTED HEART RATE: 174 BPM
POTASSIUM SERPL-SCNC: 4.2 MMOL/L (ref 3.5–5.2)
SODIUM SERPL-SCNC: 137 MMOL/L (ref 136–145)
SODIUM SERPL-SCNC: 140 MMOL/L (ref 136–145)
SODIUM SERPL-SCNC: 141 MMOL/L (ref 136–145)
SODIUM SERPL-SCNC: 142 MMOL/L (ref 136–145)
STRESS TARGET HR: 148 BPM
STRESS TARGET HR: 148 BPM

## 2022-08-02 PROCEDURE — 0 MAGNESIUM SULFATE 4 GM/100ML SOLUTION: Performed by: PHYSICIAN ASSISTANT

## 2022-08-02 PROCEDURE — 80048 BASIC METABOLIC PNL TOTAL CA: CPT | Performed by: NEUROLOGICAL SURGERY

## 2022-08-02 PROCEDURE — 83735 ASSAY OF MAGNESIUM: CPT | Performed by: NEUROLOGICAL SURGERY

## 2022-08-02 PROCEDURE — 93971 EXTREMITY STUDY: CPT

## 2022-08-02 PROCEDURE — 84295 ASSAY OF SERUM SODIUM: CPT | Performed by: NEUROLOGICAL SURGERY

## 2022-08-02 PROCEDURE — 63710000001 INSULIN LISPRO (HUMAN) PER 5 UNITS: Performed by: PHYSICIAN ASSISTANT

## 2022-08-02 PROCEDURE — 97530 THERAPEUTIC ACTIVITIES: CPT

## 2022-08-02 PROCEDURE — 93971 EXTREMITY STUDY: CPT | Performed by: INTERNAL MEDICINE

## 2022-08-02 PROCEDURE — 25010000002 HYDROMORPHONE 1 MG/ML SOLUTION: Performed by: NEUROLOGICAL SURGERY

## 2022-08-02 PROCEDURE — 63710000001 INSULIN DETEMIR PER 5 UNITS: Performed by: INTERNAL MEDICINE

## 2022-08-02 PROCEDURE — 25010000002 DEXAMETHASONE PER 1 MG: Performed by: PHYSICIAN ASSISTANT

## 2022-08-02 PROCEDURE — 93886 INTRACRANIAL COMPLETE STUDY: CPT

## 2022-08-02 PROCEDURE — 82962 GLUCOSE BLOOD TEST: CPT

## 2022-08-02 PROCEDURE — 99232 SBSQ HOSP IP/OBS MODERATE 35: CPT | Performed by: INTERNAL MEDICINE

## 2022-08-02 PROCEDURE — 99233 SBSQ HOSP IP/OBS HIGH 50: CPT | Performed by: STUDENT IN AN ORGANIZED HEALTH CARE EDUCATION/TRAINING PROGRAM

## 2022-08-02 RX ORDER — BUSPIRONE HYDROCHLORIDE 10 MG/1
10 TABLET ORAL 3 TIMES DAILY PRN
Status: DISCONTINUED | OUTPATIENT
Start: 2022-08-02 | End: 2022-08-11 | Stop reason: HOSPADM

## 2022-08-02 RX ORDER — PANTOPRAZOLE SODIUM 40 MG/1
40 TABLET, DELAYED RELEASE ORAL
Status: DISCONTINUED | OUTPATIENT
Start: 2022-08-03 | End: 2022-08-11 | Stop reason: HOSPADM

## 2022-08-02 RX ORDER — 3% SODIUM CHLORIDE 3 G/100ML
100 INJECTION, SOLUTION INTRAVENOUS CONTINUOUS
Status: DISPENSED | OUTPATIENT
Start: 2022-08-02 | End: 2022-08-03

## 2022-08-02 RX ADMIN — NIMODIPINE 60 MG: 30 CAPSULE, LIQUID FILLED ORAL at 22:00

## 2022-08-02 RX ADMIN — SENNOSIDES AND DOCUSATE SODIUM 2 TABLET: 50; 8.6 TABLET ORAL at 20:08

## 2022-08-02 RX ADMIN — DEXAMETHASONE SODIUM PHOSPHATE 4 MG: 4 INJECTION, SOLUTION INTRA-ARTICULAR; INTRALESIONAL; INTRAMUSCULAR; INTRAVENOUS; SOFT TISSUE at 10:40

## 2022-08-02 RX ADMIN — SODIUM CHLORIDE 100 ML/HR: 3 INJECTION, SOLUTION INTRAVENOUS at 10:41

## 2022-08-02 RX ADMIN — HYDROMORPHONE HYDROCHLORIDE 1 MG: 1 INJECTION, SOLUTION INTRAMUSCULAR; INTRAVENOUS; SUBCUTANEOUS at 03:18

## 2022-08-02 RX ADMIN — NIMODIPINE 60 MG: 30 CAPSULE, LIQUID FILLED ORAL at 01:54

## 2022-08-02 RX ADMIN — SENNOSIDES AND DOCUSATE SODIUM 2 TABLET: 50; 8.6 TABLET ORAL at 08:44

## 2022-08-02 RX ADMIN — BUSPIRONE HYDROCHLORIDE 10 MG: 10 TABLET ORAL at 20:08

## 2022-08-02 RX ADMIN — TICAGRELOR 90 MG: 90 TABLET ORAL at 20:08

## 2022-08-02 RX ADMIN — OXYCODONE HYDROCHLORIDE AND ACETAMINOPHEN 1 TABLET: 7.5; 325 TABLET ORAL at 20:08

## 2022-08-02 RX ADMIN — OXYCODONE HYDROCHLORIDE AND ACETAMINOPHEN 1 TABLET: 7.5; 325 TABLET ORAL at 10:40

## 2022-08-02 RX ADMIN — HYDROMORPHONE HYDROCHLORIDE 1 MG: 1 INJECTION, SOLUTION INTRAMUSCULAR; INTRAVENOUS; SUBCUTANEOUS at 12:11

## 2022-08-02 RX ADMIN — LEVETIRACETAM 500 MG: 500 TABLET, FILM COATED ORAL at 20:08

## 2022-08-02 RX ADMIN — INSULIN LISPRO 2 UNITS: 100 INJECTION, SOLUTION INTRAVENOUS; SUBCUTANEOUS at 08:43

## 2022-08-02 RX ADMIN — DEXAMETHASONE SODIUM PHOSPHATE 4 MG: 4 INJECTION, SOLUTION INTRA-ARTICULAR; INTRALESIONAL; INTRAMUSCULAR; INTRAVENOUS; SOFT TISSUE at 16:32

## 2022-08-02 RX ADMIN — HYDROMORPHONE HYDROCHLORIDE 1 MG: 1 INJECTION, SOLUTION INTRAMUSCULAR; INTRAVENOUS; SUBCUTANEOUS at 16:37

## 2022-08-02 RX ADMIN — ASPIRIN 81 MG CHEWABLE TABLET 81 MG: 81 TABLET CHEWABLE at 08:44

## 2022-08-02 RX ADMIN — SODIUM CHLORIDE 70 ML/HR: 9 INJECTION, SOLUTION INTRAVENOUS at 10:48

## 2022-08-02 RX ADMIN — MAGNESIUM SULFATE HEPTAHYDRATE 4 G: 40 INJECTION, SOLUTION INTRAVENOUS at 08:44

## 2022-08-02 RX ADMIN — OXYCODONE HYDROCHLORIDE AND ACETAMINOPHEN 1 TABLET: 7.5; 325 TABLET ORAL at 05:45

## 2022-08-02 RX ADMIN — PANTOPRAZOLE SODIUM 40 MG: 40 INJECTION, POWDER, FOR SOLUTION INTRAVENOUS at 05:45

## 2022-08-02 RX ADMIN — SODIUM CHLORIDE 2 G: 1 TABLET ORAL at 12:02

## 2022-08-02 RX ADMIN — POLYETHYLENE GLYCOL 3350 17 G: 17 POWDER, FOR SOLUTION ORAL at 12:15

## 2022-08-02 RX ADMIN — OXYCODONE HYDROCHLORIDE AND ACETAMINOPHEN 1 TABLET: 7.5; 325 TABLET ORAL at 01:54

## 2022-08-02 RX ADMIN — HYDROMORPHONE HYDROCHLORIDE 1 MG: 1 INJECTION, SOLUTION INTRAMUSCULAR; INTRAVENOUS; SUBCUTANEOUS at 22:29

## 2022-08-02 RX ADMIN — DEXAMETHASONE SODIUM PHOSPHATE 4 MG: 4 INJECTION, SOLUTION INTRA-ARTICULAR; INTRALESIONAL; INTRAMUSCULAR; INTRAVENOUS; SOFT TISSUE at 22:00

## 2022-08-02 RX ADMIN — NIMODIPINE 60 MG: 30 CAPSULE, LIQUID FILLED ORAL at 05:45

## 2022-08-02 RX ADMIN — INSULIN DETEMIR 5 UNITS: 100 INJECTION, SOLUTION SUBCUTANEOUS at 14:35

## 2022-08-02 RX ADMIN — NIMODIPINE 60 MG: 30 CAPSULE, LIQUID FILLED ORAL at 14:34

## 2022-08-02 RX ADMIN — NIMODIPINE 60 MG: 30 CAPSULE, LIQUID FILLED ORAL at 17:41

## 2022-08-02 RX ADMIN — TICAGRELOR 90 MG: 90 TABLET ORAL at 08:44

## 2022-08-02 RX ADMIN — SODIUM CHLORIDE 50 ML/HR: 3 INJECTION, SOLUTION INTRAVENOUS at 05:44

## 2022-08-02 RX ADMIN — SODIUM CHLORIDE 2 G: 1 TABLET ORAL at 17:41

## 2022-08-02 RX ADMIN — INSULIN LISPRO 2 UNITS: 100 INJECTION, SOLUTION INTRAVENOUS; SUBCUTANEOUS at 16:32

## 2022-08-02 RX ADMIN — OXYCODONE HYDROCHLORIDE AND ACETAMINOPHEN 1 TABLET: 7.5; 325 TABLET ORAL at 14:35

## 2022-08-02 RX ADMIN — SODIUM CHLORIDE 100 ML/HR: 3 INJECTION, SOLUTION INTRAVENOUS at 19:14

## 2022-08-02 RX ADMIN — LEVETIRACETAM 500 MG: 500 TABLET, FILM COATED ORAL at 08:46

## 2022-08-02 RX ADMIN — NIMODIPINE 60 MG: 30 CAPSULE, LIQUID FILLED ORAL at 10:40

## 2022-08-02 RX ADMIN — HYDROMORPHONE HYDROCHLORIDE 1 MG: 1 INJECTION, SOLUTION INTRAMUSCULAR; INTRAVENOUS; SUBCUTANEOUS at 08:40

## 2022-08-02 RX ADMIN — ACETAMINOPHEN 325MG 325 MG: 325 TABLET ORAL at 08:44

## 2022-08-02 RX ADMIN — DEXAMETHASONE SODIUM PHOSPHATE 4 MG: 4 INJECTION, SOLUTION INTRA-ARTICULAR; INTRALESIONAL; INTRAMUSCULAR; INTRAVENOUS; SOFT TISSUE at 03:18

## 2022-08-02 RX ADMIN — LISINOPRIL 5 MG: 5 TABLET ORAL at 08:48

## 2022-08-02 RX ADMIN — SODIUM CHLORIDE 2 G: 1 TABLET ORAL at 08:44

## 2022-08-02 RX ADMIN — MAGNESIUM SULFATE HEPTAHYDRATE 4 G: 40 INJECTION, SOLUTION INTRAVENOUS at 08:40

## 2022-08-02 RX ADMIN — BUSPIRONE HYDROCHLORIDE 10 MG: 10 TABLET ORAL at 12:02

## 2022-08-02 RX ADMIN — Medication 1 PATCH: at 08:52

## 2022-08-02 RX ADMIN — SODIUM CHLORIDE 100 ML/HR: 3 INJECTION, SOLUTION INTRAVENOUS at 13:33

## 2022-08-02 NOTE — PROGRESS NOTES
INTENSIVIST   PROGRESS NOTE     Hospital:  LOS: 8 days     Ms. Mary Celis, 46 y.o. female is followed for a Chief Complaint of: SAH, Headache      Subjective   S     Interval History:  No acute events. Complaining of anxiety this AM.        The patient's relevant past medical, surgical and social history were reviewed and updated in Epic as appropriate.      ROS:   Constitutional: Negative for fever.   Respiratory: Negative for dyspnea.   Cardiovascular: Negative for chest pain.   Gastrointestinal: Negative for  nausea, vomiting and diarrhea.     Objective   O     Vitals:  Temp  Min: 98 °F (36.7 °C)  Max: 98.7 °F (37.1 °C)  BP  Min: 159/60  Max: 197/73  Pulse  Min: 50  Max: 75  Resp  Min: 16  Max: 18  SpO2  Min: 88 %  Max: 98 % No data recorded    Intake/Ouptut 24 hrs (7:00AM - 6:59 AM)  Intake & Output (last 3 days)       07/30 0701 07/31 0700 07/31 0701 08/01 0700 08/01 0701  08/02 0700 08/02 0701  08/03 0700    P.O. 1075 1090 1300     I.V. (mL/kg) 2091.8 (22.9) 1895.8 (19.7) 2593.5 (26.3) 693.2 (7)    IV Piggyback  143      Total Intake(mL/kg) 3166.8 (34.6) 3128.8 (32.6) 3893.5 (39.4) 693.2 (7)    Urine (mL/kg/hr) 3025 (1.4) 3200 (1.4) 3580 (1.5) 550 (1.1)    Stool        Total Output 3025 3200 3580 550    Net +141.8 -71.2 +313.5 +143.2            Urine Unmeasured Occurrence 3 x             Medications (drips):  niCARdipine, Last Rate: Stopped (07/31/22 1439)  sodium chloride, Last Rate: 100 mL/hr (08/02/22 1041)  sodium chloride, Last Rate: 50 mL/hr (08/02/22 1118)          Physical Examination  Telemetry:  Normal sinus rhythm.    Constitutional:  No acute distress.  Resting in bed in the dark.    Eyes: No scleral icterus.   PERRL, EOM intact.    Neck:  Supple, FROM   Cardiovascular: Normal rate, regular and rhythm. Normal heart sounds.  No murmurs, gallop or rub.   Respiratory: No respiratory distress. Normal respiratory effort.  Normal breath sounds  Clear to auscultation   Abdominal:  Soft. No masses.  Nontender. No distension. No HSM.   Extremities: No digital cyanosis. No clubbing.  No peripheral edema.   Skin: No rashes, lesions or ulcers   Neurological:   Alert and Oriented to person, place, and time.              Results from last 7 days   Lab Units 08/01/22  0501   WBC 10*3/mm3 22.15*   HEMOGLOBIN g/dL 10.4*   MCV fL 82.2   PLATELETS 10*3/mm3 437     Results from last 7 days   Lab Units 08/02/22  0540 08/01/22  2328 08/01/22  1837 08/01/22  1304 08/01/22  0501 07/31/22  1830 07/31/22  1415 07/31/22  0505   SODIUM mmol/L 137 139 139   < > 138   < > 137 138   POTASSIUM mmol/L 4.2  --   --   --  4.6  --   --  4.3   CO2 mmol/L 24.0  --   --   --  26.0  --   --  28.0   CREATININE mg/dL 0.57  --   --   --  0.49*  --   --  0.48*   GLUCOSE mg/dL 218*  --   --   --  146*  --   --  133*   MAGNESIUM mg/dL 1.8  --   --   --  2.1  --  1.9 2.7*   PHOSPHORUS mg/dL  --   --   --   --  3.2  --   --   --     < > = values in this interval not displayed.     Estimated Creatinine Clearance: 148.5 mL/min (by C-G formula based on SCr of 0.57 mg/dL).              Images:  Imaging Results (Last 24 Hours)     Procedure Component Value Units Date/Time    CT Angiogram Head [[id number]] Collected: 08/02/22 0820     Updated: 08/02/22 0834    Narrative:      DATE OF EXAM: 8/1/2022 5:11 AM     PROCEDURE: CT ANGIOGRAM HEAD-     INDICATIONS: Subarachnoid hemorrhage (SAH); I60.9-Nontraumatic  subarachnoid hemorrhage, unspecified; I10-Essential (primary)  hypertension; K90.9-Intestinal malabsorption, unspecified; R10.11-Right  upper quadrant pain; D50.0-Iron deficiency anemia secondary to blood  loss (chronic); R51.9-Headache, unspecified; R41.841-Cognitive  communication deficit     COMPARISON: Images correlated with catheter angiogram performed one day  prior and CT angiogram 7/30/2022     TECHNIQUE: CTA of the head was performed after the intravenous  administration of 75 mL of Isovue 370. Reconstructed coronal and  sagittal images were also  obtained. In addition, a 3-D volume rendered  image was obtained after post processing. Automated exposure control and  iterative reconstruction methods were used.     The radiation dose reduction device was turned on for each scan per the  ALARA (As Low as Reasonably Achievable) protocol.     Stenosis measurement was performed by the NASCET or similar method.     FINDINGS:  Postprocedural changes are noted from interval flow diverting device  embolization of the recently noted small 2 mm basilar tip aneurysm.  There is no evidence of persistent aneurysm filling. Some moderate to  severe segmental narrowing of the left P1 and proximal P2 segment  posterior cerebral arteries is noted, potentially related to some  component of spasm. There is similarly some mild-to-moderate narrowing  of the right P2 segment distal to the flow diverging device. The carotid  siphons demonstrate no new stenosis or aneurysm. The anterior cerebral  arteries are normal in course and caliber bilaterally. Arterial  enhancement on the study overall is somewhat diminished, potentially due  to technical factors and/or venous contamination. The right middle  cerebral artery demonstrates no evidence of new flow-limiting stenosis,  large vessel occlusion or aneurysmal dilatation. The left middle  cerebral artery is similarly normal in course and caliber.       Impression:      Postprocedural changes noted from interval flow diverting device  exclusion/embolization of the recently noted small basilar tip aneurysm.  There is no evidence of persistent aneurysm filling. There is some  persistent moderate narrowing of the left P1 and P2 segments, similar to  comparison, concerning for some component of spasm. The right PCA distal  to the stent demonstrates some mild-to-moderate narrowing as well,  without evidence of in-stent occlusion, with distal branches otherwise  patent.      There is otherwise no new high-grade stenosis or aneurysmal  dilatation  elsewhere.     This report was finalized on 8/2/2022 8:31 AM by Domingo Gardner.               Results: Reviewed.  I reviewed the patient's new laboratory and imaging results.  I independently reviewed the patient's new images.    Medications: Reviewed.    Assessment & Plan   A / P     Ms. Celis is a 47yo F who presented to [facility] for headache. Imaging revealed a large subarachnoid hemorrhage with intraventricular involvement but no evidence of aneurysm and was transferred to [facility] on 7/25/22 for neurosurgical evaluation. Cerebral angiogram on 7/26 was negative for AVM or aneurysm. UDS was positive for methamphetamines and opiates. She was started on Nimotop, Keppra and daily TCD. 3% Saline was started.     She was taken back to the cath lab by Dr. 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     TCDs show increased velocities.     Nutrition:   Diet Regular  Advance Directives:   Code Status and Medical Interventions:   Ordered at: 07/26/22 6673     Level Of Support Discussed With:    Patient     Code Status (Patient has no pulse and is not breathing):    CPR (Attempt to Resuscitate)     Medical Interventions (Patient has pulse or is breathing):    Full Support       Active Hospital Problems    Diagnosis    • **SAH     • Smoker    • Class 1 obesity in adult    • Illicit drug use (UDS + meth/amphetamines, opiates, and oxycodone)     • Essential hypertension    • Iron deficiency anemia due to chronic blood loss        Assessment / Plan:    1. 3% hypertonic saline. Goal -155.   2. SBP goal .   3. Daily TCDs  4. Nimotop, Decadron and Keppra  5. ASA and Brilinta  6. SSI. Add low dose Levemir.   7. LUE duplex pending.   8. Start PRN Buspar for anxiety. Will avoid benzodiazepines secondary to illicit drug use.   9. Replace magnesium.   10. AM labs  11. To remain in ICU.     High level of risk due to:  severe exacerbation of chronic illness and illness with  threat to life or bodily function.    Plan of care and goals reviewed during interdisciplinary rounds.  I discussed the patient's findings and my recommendations with patient and nursing staff        Mary Batista,     Intensive Care Medicine and Pulmonary Medicine

## 2022-08-02 NOTE — PLAN OF CARE
Goal Outcome Evaluation:  Plan of Care Reviewed With: patient        Progress: no change  Outcome Evaluation: NIH 0. No reported N/V. C/O pain and was medicated. Vital Signs with desired limits. Positive I&O's maintained. Midnight Sodium was 139. 3% saline infusing at 50 ml/hr and NS @ 70 ml/hr.

## 2022-08-02 NOTE — PROGRESS NOTES
"NEUROSURGERY PROGRESS NOTE    Chief Complaint: SAH from ruptured basilar aneurysm     Subjective: stable o/n.    Objective    Vital Signs: Blood pressure (!) 183/86, pulse 50, temperature 98 °F (36.7 °C), temperature source Oral, resp. rate 16, height 170 cm (66.93\"), weight 98.7 kg (217 lb 9.5 oz), last menstrual period 05/08/2018, SpO2 94 %, not currently breastfeeding.    Physical Exam  Awake, alert and oriented x 3  Opens eyes spont  Pupils 3 mm rx bilat  Extraocular muscles intact bilaterally  Face symmetric bilaterally  Tongue midline  5/5 in all 4 ext  No pronator drift    Intake/Output:     Intake/Output Summary (Last 24 hours) at 8/2/2022 1028  Last data filed at 8/2/2022 0844  Gross per 24 hour   Intake 3701.3 ml   Output 2930 ml   Net 771.3 ml       Current Medications:   Current Facility-Administered Medications:   •  acetaminophen (TYLENOL) tablet 325 mg, 325 mg, Oral, Daily, Duran Willis MD, 325 mg at 08/02/22 0844  •  acetaminophen (TYLENOL) tablet 650 mg, 650 mg, Oral, Q4H PRN, Duran Willis MD, 650 mg at 08/01/22 0356  •  aspirin chewable tablet 81 mg, 81 mg, Oral, Daily, Duran Willis MD, 81 mg at 08/02/22 0844  •  sennosides-docusate (PERICOLACE) 8.6-50 MG per tablet 2 tablet, 2 tablet, Oral, BID, 2 tablet at 08/02/22 0844 **AND** polyethylene glycol (MIRALAX) packet 17 g, 17 g, Oral, Daily **AND** bisacodyl (DULCOLAX) EC tablet 5 mg, 5 mg, Oral, Daily PRN **AND** bisacodyl (DULCOLAX) suppository 10 mg, 10 mg, Rectal, Daily PRN, Mary Batista, DO  •  dexamethasone (DECADRON) injection 4 mg, 4 mg, Intravenous, Q6H, Laisha Siddiqui PA-C, 4 mg at 08/02/22 0318  •  dextrose (D50W) (25 g/50 mL) IV injection 25 g, 25 g, Intravenous, Q15 Min PRN, Laisha Siddiqui PA-C  •  dextrose (GLUTOSE) oral gel 15 g, 15 g, Oral, Q15 Min PRN, Laisha Siddiqui PA-C  •  enalaprilat (VASOTEC) injection 1.25 mg, 1.25 mg, Intravenous, Q6H PRN, Duran Willis MD, 1.25 mg " at 07/29/22 2112  •  glucagon (human recombinant) (GLUCAGEN DIAGNOSTIC) injection 1 mg, 1 mg, Intramuscular, Q15 Min PRN, Laisha Siddiqui PA-C  •  hydrALAZINE (APRESOLINE) injection 10 mg, 10 mg, Intravenous, Q4H PRN, Duran Willis MD, 10 mg at 07/30/22 0040  •  HYDROmorphone (DILAUDID) injection 1 mg, 1 mg, Intravenous, Q4H PRN, Duran Willis MD, 1 mg at 08/02/22 0840  •  Insulin Lispro (humaLOG) injection 0-7 Units, 0-7 Units, Subcutaneous, TID AC, Laisha Siddiqui PA-C, 2 Units at 08/02/22 0843  •  labetalol (NORMODYNE,TRANDATE) injection 10 mg, 10 mg, Intravenous, Q10 Min PRN, Duran Willis MD  •  levETIRAcetam (KEPPRA) tablet 500 mg, 500 mg, Oral, BID, Duran Willis MD, 500 mg at 08/02/22 0846  •  lisinopril (PRINIVIL,ZESTRIL) tablet 5 mg, 5 mg, Oral, Q24H, Duran Willis MD, 5 mg at 08/02/22 0848  •  Magnesium Sulfate 2 gram Bolus, followed by 8 gram infusion (total Mg dose 10 grams)- Mg less than or equal to 1mg/dL, 2 g, Intravenous, PRN **OR** Magnesium Sulfate 2 gram / 50mL Infusion (GIVE X 3 BAGS TO EQUAL 6GM TOTAL DOSE) - Mg 1.1 - 1.5 mg/dl, 2 g, Intravenous, PRN **OR** Magnesium Sulfate 4 gram infusion- Mg 1.6-1.9 mg/dL, 4 g, Intravenous, PRN, Laisha Siddiqui PA-C, Last Rate: 25 mL/hr at 08/02/22 0844, 4 g at 08/02/22 0844  •  niCARdipine (CARDENE) 20 mg in 200 mL NS infusion, 5-15 mg/hr, Intravenous, Titrated, Duran Willis MD, Held at 07/31/22 1439  •  nicotine (NICODERM CQ) 21 MG/24HR patch 1 patch, 1 patch, Transdermal, Q24H, Laisha Siddiqui PA-C, 1 patch at 08/02/22 0852  •  niMODipine (NIMOTOP) capsule 60 mg, 60 mg, Oral, Q4H, Duran Willis MD, 60 mg at 08/02/22 0545  •  ondansetron (ZOFRAN) injection 4 mg, 4 mg, Intravenous, Q6H PRN, Duran Willis MD, 4 mg at 08/01/22 1223  •  oxyCODONE-acetaminophen (PERCOCET) 7.5-325 MG per tablet 1 tablet, 1 tablet, Oral, Q4H PRN, Duran Willis MD, 1  tablet at 08/02/22 0545  •  pantoprazole (PROTONIX) injection 40 mg, 40 mg, Intravenous, Q AM, Laisha Siddiqui PA-C, 40 mg at 08/02/22 0545  •  simethicone (MYLICON) chewable tablet 80 mg, 80 mg, Oral, 4x Daily PRN, Case, Mary V., DO  •  sodium chloride (HYPERTONIC) 3 % infusion, 75 mL/hr, Intravenous, Continuous, Brannon Barrientos MD, Last Rate: 75 mL/hr at 08/02/22 0839, 75 mL/hr at 08/02/22 0839  •  sodium chloride (HYPERTONIC) 3 % infusion, 100 mL/hr, Intravenous, Continuous, Brannon Barrientos MD  •  sodium chloride 0.9 % infusion, 70 mL/hr, Intravenous, Continuous, Laisha Siddiqui PA-C, Last Rate: 70 mL/hr at 08/01/22 2217, 70 mL/hr at 08/01/22 2217  •  sodium chloride tablet 2 g, 2 g, Oral, TID With Meals, Laisha Siddiqui PA-C, 2 g at 08/02/22 0844  •  ticagrelor (BRILINTA) tablet 90 mg, 90 mg, Oral, BID, Duran Willis MD, 90 mg at 08/02/22 0844     Laboratory Results:       Lab 08/01/22  0501   WBC 22.15*   HEMOGLOBIN 10.4*   HEMATOCRIT 33.6*   PLATELETS 437   NEUTROS ABS 19.85*   IMMATURE GRANS (ABS) 0.28*   LYMPHS ABS 0.70   MONOS ABS 1.29*   EOS ABS 0.00   MCV 82.2         Lab 08/02/22  0540 08/01/22  2328 08/01/22  1837 08/01/22  1304 08/01/22  0501 07/31/22  1830 07/31/22  1415 07/31/22  0505 07/30/22  2213 07/30/22  0547 07/30/22  0007   SODIUM 137 139 139 138 138   < > 137 138 137   < > 133*   POTASSIUM 4.2  --   --   --  4.6  --   --  4.3 4.3  --  4.2   CHLORIDE 104  --   --   --  106  --   --  103 102  --  100   CO2 24.0  --   --   --  26.0  --   --  28.0 24.0  --  25.0   ANION GAP 9.0  --   --   --  6.0  --   --  7.0 11.0  --  8.0   BUN 13  --   --   --  15  --   --  16 20  --  16   CREATININE 0.57  --   --   --  0.49*  --   --  0.48* 0.51*  --  0.71   EGFR 113.7  --   --   --  117.9  --   --  118.5 116.8  --  106.3   GLUCOSE 218*  --   --   --  146*  --   --  133* 188*  --  188*   CALCIUM 7.9*  --   --   --  8.1*  --   --  8.1* 8.6  --  8.5*   MAGNESIUM 1.8  --   --   --  2.1   --  1.9 2.7* 1.7  --   --    PHOSPHORUS  --   --   --   --  3.2  --   --   --   --   --   --     < > = values in this interval not displayed.                         Brief Urine Lab Results     None        Microbiology Results (last 10 days)     Procedure Component Value - Date/Time    COVID PRE-OP / PRE-PROCEDURE SCREENING ORDER (NO ISOLATION) - Swab, Nasopharynx [[id number]]  (Normal) Collected: 07/25/22 1559    Lab Status: Final result Specimen: Swab from Nasopharynx Updated: 07/25/22 1708    Narrative:      The following orders were created for panel order COVID PRE-OP / PRE-PROCEDURE SCREENING ORDER (NO ISOLATION) - Swab, Nasopharynx.  Procedure                               Abnormality         Status                     ---------                               -----------         ------                     Respiratory Panel PCR w/...[[id number]]  Normal              Final result                 Please view results for these tests on the individual orders.    Respiratory Panel PCR w/COVID-19(SARS-CoV-2) MAURICE/ROBERTO/HERBERT/PAD/COR/MAD/JAYLYN In-House, NP Swab in UTM/VTM, 3-4 HR TAT - Swab, Nasopharynx [[id number]]  (Normal) Collected: 07/25/22 1559    Lab Status: Final result Specimen: Swab from Nasopharynx Updated: 07/25/22 1708     ADENOVIRUS, PCR Not Detected     Coronavirus 229E Not Detected     Coronavirus HKU1 Not Detected     Coronavirus NL63 Not Detected     Coronavirus OC43 Not Detected     COVID19 Not Detected     Human Metapneumovirus Not Detected     Human Rhinovirus/Enterovirus Not Detected     Influenza A PCR Not Detected     Influenza B PCR Not Detected     Parainfluenza Virus 1 Not Detected     Parainfluenza Virus 2 Not Detected     Parainfluenza Virus 3 Not Detected     Parainfluenza Virus 4 Not Detected     RSV, PCR Not Detected     Bordetella pertussis pcr Not Detected     Bordetella parapertussis PCR Not Detected     Chlamydophila pneumoniae PCR Not Detected     Mycoplasma pneumo by PCR Not Detected     Narrative:      In the setting of a positive respiratory panel with a viral infection PLUS a negative procalcitonin without other underlying concern for bacterial infection, consider observing off antibiotics or discontinuation of antibiotics and continue supportive care. If the respiratory panel is positive for atypical bacterial infection (Bordetella pertussis, Chlamydophila pneumoniae, or Mycoplasma pneumoniae), consider antibiotic de-escalation to target atypical bacterial infection.           Diagnostic Imaging: I reviewed and independently interpreted the new imaging.     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  -Neurologically, patient has remained stable.  Her TCD's do show increased velocities.  We are going to increase her IV fluids to 100 mls per hour.  Sodium goal between 145 and 155. We will continue to monitor her neurological exam closely.  Continue Keppra and nimodipine.  Appreciate ICU assistance.      Brannon Barrientos MD  08/02/22  10:28 EDT

## 2022-08-02 NOTE — PLAN OF CARE
Goal Outcome Evaluation:  Plan of Care Reviewed With: (P) patient, sibling        Progress: (P) improving  Outcome Evaluation: (P) Pt w/ improvements as noted by increased distance ambulated to 300 ft CGA x 1 w/ B UE support on telemonitor. Pt required intermittent VCs for upright posture and gait mechanics. Pt limited by abdominal pain, but had improvements in pain w/ ambulation. Pt will cont to benefit from skilled IPPT to address deficits and will progress at pt tolerates. PT rec d/c home w/ assist pending medical stauts.

## 2022-08-02 NOTE — THERAPY TREATMENT NOTE
Patient Name: Mary Celis  : 1975    MRN: [id number]                              Today's Date: 2022       Admit Date: 2022    Visit Dx:     ICD-10-CM ICD-9-CM   1. SAH (subarachnoid hemorrhage) (HCC)  I60.9 430   2. Essential hypertension  I10 401.9   3. Iron malabsorption  K90.9 579.8   4. Right upper quadrant abdominal pain  R10.11 789.01   5. Iron deficiency anemia due to chronic blood loss  D50.0 280.0   6. Acute nonintractable headache, unspecified headache type  R51.9 784.0   7. Cognitive communication deficit  R41.841 799.52     Patient Active Problem List   Diagnosis   • Iron deficiency anemia due to chronic blood loss   • Right upper quadrant abdominal pain   • Iron malabsorption   • SAH    • Essential hypertension   • Smoker   • Class 1 obesity in adult   • Illicit drug use (UDS + meth/amphetamines, opiates, and oxycodone)      Past Medical History:   Diagnosis Date   • Abnormal Pap smear of cervix    • Anemia    • Arthritis    • Arthritis    • Body piercing     EARS   • Cancer (HCC)     REPORTS PRE-CERVICAL CANCER   • Depression with anxiety    • Gout    • History of blood transfusion    • History of transfusion     REPORTS HAS HAD SEVERAL TRANSFUSIONS, NO REACTIONS   • IBS (irritable bowel syndrome)    • Panic attack    • Tattoo     LEFT FOREARM   • Wears glasses      Past Surgical History:   Procedure Laterality Date   • CEREBRAL ANGIOGRAM N/A 2022    Procedure: Cerebral angiogram;  Surgeon: Spencer Hauser MD;  Location: [facility] INVASIVE LOCATION;  Service: Interventional Radiology;  Laterality: N/A;   • CHOLECYSTECTOMY     • CHOLECYSTECTOMY WITH INTRAOPERATIVE CHOLANGIOGRAM N/A 2017    Procedure: CHOLECYSTECTOMY LAPAROSCOPIC INTRAOPERATIVE CHOLANGIOGRAM;  Surgeon: Albaro Lopez MD;  Location: [facility] OR;  Service:    • COLONOSCOPY N/A 2017    Procedure: COLONOSCOPY;  Surgeon: Albaro Lopez MD;  Location: [facility] ENDOSCOPY;  Service:    • D & C HYSTEROSCOPY       VITAL SIGNS UNSTABLE WITH THIS PROCEDURE   • ENDOSCOPY N/A 7/28/2017    Procedure: ESOPHAGOGASTRODUODENOSCOPY WITH BIOPSIES;  Surgeon: Albaro Lopez MD;  Location: [facility] ENDOSCOPY;  Service:    • INTERVENTIONAL RADIOLOGY PROCEDURE Bilateral 7/31/2022    Procedure: CAROTID CEREBRAL ANGIOGRAM BILATERAL;  Surgeon: Duran Willis MD;  Location: [facility] INVASIVE LOCATION;  Service: Interventional Radiology;  Laterality: Bilateral;   • TUBAL ABDOMINAL LIGATION     • WISDOM TOOTH EXTRACTION        General Information     Row Name 08/02/22 1446          Physical Therapy Time and Intention    Document Type therapy note (daily note) (P)   -WJ     Mode of Treatment physical therapy (P)   -WJ     Row Name 08/02/22 1446          General Information    Patient Profile Reviewed yes (P)   -WJ     Existing Precautions/Restrictions fall (P)   -WJ     Row Name 08/02/22 1446          Cognition    Orientation Status (Cognition) oriented x 4 (P)   -WJ     Row Name 08/02/22 1446          Safety Issues, Functional Mobility    Safety Issues Affecting Function (Mobility) awareness of need for assistance;insight into deficits/self-awareness;impulsivity;safety precaution awareness;safety precautions follow-through/compliance;judgment (P)   -WJ     Impairments Affecting Function (Mobility) balance;coordination;endurance/activity tolerance;pain (P)   -WJ     Comment, Safety Issues/Impairments (Mobility) Pt w/ complaints of high pain levels (P)   -WJ           User Key  (r) = Recorded By, (t) = Taken By, (c) = Cosigned By    Initials Name Provider Type    WJ Bobby Dumas, PT Student PT Student               Mobility     Row Name 08/02/22 1447          Bed Mobility    Bed Mobility supine-sit;sit-supine;scooting/bridging (P)   -WJ     Scooting/Bridging [place] (Bed Mobility) standby assist (P)   -WJ     Supine-Sit [place] (Bed Mobility) standby assist;verbal cues;nonverbal cues (demo/gesture) (P)   -WJ     Sit-Supine  [place] (Bed Mobility) standby assist;verbal cues;nonverbal cues (demo/gesture) (P)   -WJ     Assistive Device (Bed Mobility) bed rails;head of bed elevated (P)   -WJ     Comment, (Bed Mobility) Pt required increased time d/t increased abdominal pain (P)   -WJ     Row Name 08/02/22 1447          Sit-Stand Transfer    Sit-Stand [place] (Transfers) standby assist;verbal cues;nonverbal cues (demo/gesture) (P)   -WJ     Comment, (Sit-Stand Transfer) Pt impulsive w/ STS; requiring cues to prevent lines from pulling, but responded well to cueing. STS x 2, x 1 from bed, x 1 from bathroom commode; pt had BM and pt independent w/ pericare. (P)   -WJ     Row Name 08/02/22 1447          Gait/Stairs (Locomotion)    [place] Level (Gait) verbal cues;contact guard;1 person assist (P)   -WJ     Assistive Device (Gait) other (see comments) (P)   B UE A on telemonitor  -WJ     Distance in Feet (Gait) 300 (P)   -WJ     Deviations/Abnormal Patterns (Gait) base of support, narrow;benja decreased;stride length decreased (P)   -WJ     Bilateral Gait Deviations forward flexed posture;heel strike decreased (P)   -WJ     [place] Level (Stairs) not tested (P)   -WJ     Comment, (Gait/Stairs) Pt w/ step through gait pattern, decreased stride length and foward flexed posture. Pt required intermittent VCs for upright posture and  to increase stride length. Pt w/ improvements in abdominal pain w/ ambulation. (P)   -WJ           User Key  (r) = Recorded By, (t) = Taken By, (c) = Cosigned By    Initials Name Provider Type    WJ Bobby Dumas, PT Student PT Student               Obj/Interventions     Row Name 08/02/22 9801          Balance    Balance Assessment sitting static balance;sitting dynamic balance;sit to stand dynamic balance;standing static balance;standing dynamic balance (P)   -WJ     Static Sitting Balance independent (P)   -WJ     Dynamic Sitting Balance independent (P)   -WJ     Position, Sitting Balance  unsupported;sitting edge of bed (P)   -WJ     Sit to Stand Dynamic Balance standby assist;verbal cues (P)   -WJ     Static Standing Balance standby assist (P)   -WJ     Dynamic Standing Balance contact guard (P)   -WJ     Position/Device Used, Standing Balance supported;other (see comments) (P)   B UE A on telemon  -WJ     Balance Interventions sitting;standing;sit to stand;static;dynamic;moderate challenge (P)   -WJ     Comment, Balance No LOB (P)   -WJ           User Key  (r) = Recorded By, (t) = Taken By, (c) = Cosigned By    Initials Name Provider Type    WJ Bobby Dumas, PT Student PT Student               Goals/Plan    No documentation.                Clinical Impression     Row Name 08/02/22 1453          Pain    Pretreatment Pain Rating 8/10 (P)   -WJ     Posttreatment Pain Rating 7/10 (P)   -WJ     Pain Location generalized (P)   -WJ     Pain Location - abdomen (P)   -WJ     Pain Intervention(s) Repositioned;Ambulation/increased activity;Nursing Notified;Rest (P)   -WJ     Row Name 08/02/22 1453          Plan of Care Review    Plan of Care Reviewed With patient;sibling (P)   -WJ     Progress improving (P)   -WJ     Outcome Evaluation Pt w/ improvements as noted by increased distance ambulated to 300 ft CGA x 1 w/ B UE support on telemonitor. Pt required intermittent VCs for upright posture and gait mechanics. Pt limited by abdominal pain, but had improvements in pain w/ ambulation. Pt will cont to benefit from skilled IPPT to address deficits and will progress at pt tolerates. PT rec d/c home w/ assist pending medical stauts. (P)   -WJ     Row Name 08/02/22 1457          Therapy Assessment/Plan (PT)    Patient/Family Therapy Goals Statement (PT) To go home (P)   -WJ     Rehab Potential (PT) good, to achieve stated therapy goals (P)   -WJ     Criteria for Skilled Interventions Met (PT) yes;meets criteria;skilled treatment is necessary (P)   -WJ     Therapy Frequency (PT) daily (P)   -WJ     Row Name  08/02/22 1453          Vital Signs    Pre Systolic BP Rehab 176 (P)   -WJ     Pre Treatment Diastolic BP 83 (P)   -WJ     Post Systolic BP Rehab 158 (P)   -WJ     Post Treatment Diastolic BP 72 (P)   -WJ     Pretreatment Heart Rate (beats/min) 51 (P)   -WJ     Posttreatment Heart Rate (beats/min) 50 (P)   -WJ     Pre SpO2 (%) 96 (P)   -WJ     O2 Delivery Pre Treatment room air (P)   -WJ     O2 Delivery Intra Treatment room air (P)   -WJ     Post SpO2 (%) 96 (P)   -WJ     O2 Delivery Post Treatment room air (P)   -WJ     Pre Patient Position Supine (P)   -WJ     Intra Patient Position Standing (P)   -WJ     Post Patient Position Supine (P)   -WJ     Row Name 08/02/22 1453          Positioning and Restraints    Pre-Treatment Position in bed (P)   -WJ     Post Treatment Position bed (P)   -WJ     In Bed notified nsg;supine;call light within reach;encouraged to call for assist;side rails up x2;heels elevated;legs elevated (P)   -WJ           User Key  (r) = Recorded By, (t) = Taken By, (c) = Cosigned By    Initials Name Provider Type    WBobby Sullivan, PT Student PT Student               Outcome Measures     Row Name 08/02/22 1458 08/02/22 0800       How much help from another person do you currently need...    Turning from your back to your side while in flat bed without using bedrails? 4 (P)   -WJ 4  -LM    Moving from lying on back to sitting on the side of a flat bed without bedrails? 4 (P)   -WJ 4  -LM    Moving to and from a bed to a chair (including a wheelchair)? 4 (P)   -WJ 4  -LM    Standing up from a chair using your arms (e.g., wheelchair, bedside chair)? 3 (P)   -WJ 3  -LM    Climbing 3-5 steps with a railing? 3 (P)   -WJ 3  -LM    To walk in hospital room? 3 (P)   -WJ 3  -LM    AM-PAC 6 Clicks Score (PT) 21 (P)   -WJ 21  -LM    Highest level of mobility 6 --> Walked 10 steps or more (P)   -WJ 6 --> Walked 10 steps or more  -    Row Name 08/02/22 5712          Functional Assessment    Outcome Measure  Options AM-PAC 6 Clicks Basic Mobility (PT) (P)   -WJ           User Key  (r) = Recorded By, (t) = Taken By, (c) = Cosigned By    Initials Name Provider Type    Marie Judge, RN Registered Nurse    Bobby Saldivar, PT Student PT Student                             Physical Therapy Education                 Title: PT OT SLP Therapies (In Progress)     Topic: Physical Therapy (Done)     Point: Mobility training (Done)     Learning Progress Summary           Patient Acceptance, E, VU,DU by W at 8/2/2022 1458      Show all documentation for this point (3)                 Point: Home exercise program (Done)     Learning Progress Summary           Patient Acceptance, E, VU,NR by  at 7/29/2022 1137                   Point: Body mechanics (Done)     Learning Progress Summary           Patient Acceptance, E, VU,DU by W at 8/2/2022 1458      Show all documentation for this point (3)                 Point: Precautions (Done)     Learning Progress Summary           Patient Acceptance, E, VU,DU by W at 8/2/2022 1458      Show all documentation for this point (3)                             User Key     Initials Effective Dates Name Provider Type Discipline    KG 05/22/20 -  Stefani Ventura, PT Physical Therapist PT     05/31/22 -  Bobby Dumas, PT Student PT Student PT              PT Recommendation and Plan     Plan of Care Reviewed With: (P) patient, sibling  Progress: (P) improving  Outcome Evaluation: (P) Pt w/ improvements as noted by increased distance ambulated to 300 ft CGA x 1 w/ B UE support on telemonitor. Pt required intermittent VCs for upright posture and gait mechanics. Pt limited by abdominal pain, but had improvements in pain w/ ambulation. Pt will cont to benefit from skilled IPPT to address deficits and will progress at pt tolerates. PT rec d/c home w/ assist pending medical stauts.     Time Calculation:    PT Charges     Row Name 08/02/22 3293             Time Calculation    Start  Time 1301 (P)   -WJ      PT Received On 08/02/22 (P)   -WJ      PT Goal Re-Cert Due Date 08/06/22 (P)   -WJ              Time Calculation- PT    Total Timed Code Minutes- PT 24 minute(s) (P)   -WJ              Timed Charges    78286 - PT Therapeutic Activity Minutes 24 (P)   -WJ              Total Minutes    Timed Charges Total Minutes 24 (P)   -WJ       Total Minutes 24 (P)   -WJ            User Key  (r) = Recorded By, (t) = Taken By, (c) = Cosigned By    Initials Name Provider Type    Bobby Saldivar, PT Student PT Student              Therapy Charges for Today     Code Description Service Date Service Provider Modifiers Qty    86220974252 HC PT THERAPEUTIC ACT EA 15 MIN 8/2/2022 Bobby Dumas, PT Student GP 2          PT G-Codes  Outcome Measure Options: (P) AM-PAC 6 Clicks Basic Mobility (PT)  AM-PAC 6 Clicks Score (PT): (P) 21  AM-PAC 6 Clicks Score (OT): 20  Modified Boyle Scale: 1 - No significant disability despite symptoms.  Able to carry out all usual duties and activities.    BOBBY DUMAS, PT Student  8/2/2022

## 2022-08-03 ENCOUNTER — APPOINTMENT (OUTPATIENT)
Dept: CARDIOLOGY | Facility: HOSPITAL | Age: 47
End: 2022-08-03

## 2022-08-03 LAB
ANION GAP SERPL CALCULATED.3IONS-SCNC: 9 MMOL/L (ref 5–15)
BUN SERPL-MCNC: 13 MG/DL (ref 6–20)
BUN/CREAT SERPL: 27.1 (ref 7–25)
CALCIUM SPEC-SCNC: 7.7 MG/DL (ref 8.6–10.5)
CHLORIDE SERPL-SCNC: 106 MMOL/L (ref 98–107)
CO2 SERPL-SCNC: 25 MMOL/L (ref 22–29)
CREAT SERPL-MCNC: 0.48 MG/DL (ref 0.57–1)
EGFRCR SERPLBLD CKD-EPI 2021: 118.5 ML/MIN/1.73
GLUCOSE BLDC GLUCOMTR-MCNC: 120 MG/DL (ref 70–130)
GLUCOSE BLDC GLUCOMTR-MCNC: 121 MG/DL (ref 70–130)
GLUCOSE BLDC GLUCOMTR-MCNC: 126 MG/DL (ref 70–130)
GLUCOSE BLDC GLUCOMTR-MCNC: 218 MG/DL (ref 70–130)
GLUCOSE SERPL-MCNC: 118 MG/DL (ref 65–99)
MAGNESIUM SERPL-MCNC: 1.7 MG/DL (ref 1.6–2.6)
POTASSIUM SERPL-SCNC: 4.2 MMOL/L (ref 3.5–5.2)
SODIUM SERPL-SCNC: 136 MMOL/L (ref 136–145)
SODIUM SERPL-SCNC: 138 MMOL/L (ref 136–145)
SODIUM SERPL-SCNC: 140 MMOL/L (ref 136–145)
SODIUM SERPL-SCNC: 140 MMOL/L (ref 136–145)

## 2022-08-03 PROCEDURE — 82962 GLUCOSE BLOOD TEST: CPT

## 2022-08-03 PROCEDURE — 84295 ASSAY OF SERUM SODIUM: CPT | Performed by: NEUROLOGICAL SURGERY

## 2022-08-03 PROCEDURE — 99232 SBSQ HOSP IP/OBS MODERATE 35: CPT | Performed by: INTERNAL MEDICINE

## 2022-08-03 PROCEDURE — 80048 BASIC METABOLIC PNL TOTAL CA: CPT | Performed by: NEUROLOGICAL SURGERY

## 2022-08-03 PROCEDURE — 93886 INTRACRANIAL COMPLETE STUDY: CPT

## 2022-08-03 PROCEDURE — 99233 SBSQ HOSP IP/OBS HIGH 50: CPT | Performed by: STUDENT IN AN ORGANIZED HEALTH CARE EDUCATION/TRAINING PROGRAM

## 2022-08-03 PROCEDURE — 0 MAGNESIUM SULFATE 4 GM/100ML SOLUTION: Performed by: PHYSICIAN ASSISTANT

## 2022-08-03 PROCEDURE — 83735 ASSAY OF MAGNESIUM: CPT | Performed by: NEUROLOGICAL SURGERY

## 2022-08-03 PROCEDURE — 25010000002 HYDROMORPHONE 1 MG/ML SOLUTION: Performed by: NEUROLOGICAL SURGERY

## 2022-08-03 PROCEDURE — 63710000001 INSULIN DETEMIR PER 5 UNITS: Performed by: INTERNAL MEDICINE

## 2022-08-03 PROCEDURE — 25010000002 DEXAMETHASONE PER 1 MG: Performed by: PHYSICIAN ASSISTANT

## 2022-08-03 PROCEDURE — 97116 GAIT TRAINING THERAPY: CPT

## 2022-08-03 RX ORDER — 3% SODIUM CHLORIDE 3 G/100ML
125 INJECTION, SOLUTION INTRAVENOUS CONTINUOUS
Status: DISCONTINUED | OUTPATIENT
Start: 2022-08-03 | End: 2022-08-04

## 2022-08-03 RX ORDER — 3% SODIUM CHLORIDE 3 G/100ML
150 INJECTION, SOLUTION INTRAVENOUS CONTINUOUS
Status: DISPENSED | OUTPATIENT
Start: 2022-08-03 | End: 2022-08-04

## 2022-08-03 RX ADMIN — LEVETIRACETAM 500 MG: 500 TABLET, FILM COATED ORAL at 08:56

## 2022-08-03 RX ADMIN — TICAGRELOR 90 MG: 90 TABLET ORAL at 08:57

## 2022-08-03 RX ADMIN — NIMODIPINE 60 MG: 30 CAPSULE, LIQUID FILLED ORAL at 06:16

## 2022-08-03 RX ADMIN — NIMODIPINE 60 MG: 30 CAPSULE, LIQUID FILLED ORAL at 10:12

## 2022-08-03 RX ADMIN — DEXAMETHASONE SODIUM PHOSPHATE 4 MG: 4 INJECTION, SOLUTION INTRA-ARTICULAR; INTRALESIONAL; INTRAMUSCULAR; INTRAVENOUS; SOFT TISSUE at 03:48

## 2022-08-03 RX ADMIN — ACETAMINOPHEN 325MG 325 MG: 325 TABLET ORAL at 08:56

## 2022-08-03 RX ADMIN — MAGNESIUM SULFATE HEPTAHYDRATE 4 G: 40 INJECTION, SOLUTION INTRAVENOUS at 06:15

## 2022-08-03 RX ADMIN — BISACODYL 10 MG: 10 SUPPOSITORY RECTAL at 16:07

## 2022-08-03 RX ADMIN — OXYCODONE HYDROCHLORIDE AND ACETAMINOPHEN 1 TABLET: 7.5; 325 TABLET ORAL at 06:15

## 2022-08-03 RX ADMIN — OXYCODONE HYDROCHLORIDE AND ACETAMINOPHEN 1 TABLET: 7.5; 325 TABLET ORAL at 14:16

## 2022-08-03 RX ADMIN — PANTOPRAZOLE SODIUM 40 MG: 40 TABLET, DELAYED RELEASE ORAL at 06:16

## 2022-08-03 RX ADMIN — HYDROMORPHONE HYDROCHLORIDE 1 MG: 1 INJECTION, SOLUTION INTRAMUSCULAR; INTRAVENOUS; SUBCUTANEOUS at 12:26

## 2022-08-03 RX ADMIN — DEXAMETHASONE SODIUM PHOSPHATE 4 MG: 4 INJECTION, SOLUTION INTRA-ARTICULAR; INTRALESIONAL; INTRAMUSCULAR; INTRAVENOUS; SOFT TISSUE at 16:03

## 2022-08-03 RX ADMIN — HYDROMORPHONE HYDROCHLORIDE 1 MG: 1 INJECTION, SOLUTION INTRAMUSCULAR; INTRAVENOUS; SUBCUTANEOUS at 08:17

## 2022-08-03 RX ADMIN — LEVETIRACETAM 500 MG: 500 TABLET, FILM COATED ORAL at 20:11

## 2022-08-03 RX ADMIN — DEXAMETHASONE SODIUM PHOSPHATE 4 MG: 4 INJECTION, SOLUTION INTRA-ARTICULAR; INTRALESIONAL; INTRAMUSCULAR; INTRAVENOUS; SOFT TISSUE at 10:12

## 2022-08-03 RX ADMIN — INSULIN DETEMIR 5 UNITS: 100 INJECTION, SOLUTION SUBCUTANEOUS at 08:57

## 2022-08-03 RX ADMIN — SODIUM CHLORIDE 50 ML/HR: 9 INJECTION, SOLUTION INTRAVENOUS at 05:00

## 2022-08-03 RX ADMIN — SODIUM CHLORIDE 125 ML/HR: 3 INJECTION, SOLUTION INTRAVENOUS at 16:33

## 2022-08-03 RX ADMIN — SODIUM CHLORIDE 150 ML/HR: 3 INJECTION, SOLUTION INTRAVENOUS at 20:45

## 2022-08-03 RX ADMIN — SODIUM CHLORIDE 100 ML/HR: 3 INJECTION, SOLUTION INTRAVENOUS at 00:18

## 2022-08-03 RX ADMIN — SENNOSIDES AND DOCUSATE SODIUM 2 TABLET: 50; 8.6 TABLET ORAL at 08:57

## 2022-08-03 RX ADMIN — HYDROMORPHONE HYDROCHLORIDE 1 MG: 1 INJECTION, SOLUTION INTRAMUSCULAR; INTRAVENOUS; SUBCUTANEOUS at 23:57

## 2022-08-03 RX ADMIN — Medication 1 PATCH: at 08:57

## 2022-08-03 RX ADMIN — OXYCODONE HYDROCHLORIDE AND ACETAMINOPHEN 1 TABLET: 7.5; 325 TABLET ORAL at 18:02

## 2022-08-03 RX ADMIN — SENNOSIDES AND DOCUSATE SODIUM 2 TABLET: 50; 8.6 TABLET ORAL at 20:11

## 2022-08-03 RX ADMIN — BUSPIRONE HYDROCHLORIDE 10 MG: 10 TABLET ORAL at 18:02

## 2022-08-03 RX ADMIN — SODIUM CHLORIDE 100 ML/HR: 3 INJECTION, SOLUTION INTRAVENOUS at 10:12

## 2022-08-03 RX ADMIN — DEXAMETHASONE SODIUM PHOSPHATE 4 MG: 4 INJECTION, SOLUTION INTRA-ARTICULAR; INTRALESIONAL; INTRAMUSCULAR; INTRAVENOUS; SOFT TISSUE at 22:17

## 2022-08-03 RX ADMIN — NIMODIPINE 60 MG: 30 CAPSULE, LIQUID FILLED ORAL at 18:02

## 2022-08-03 RX ADMIN — OXYCODONE HYDROCHLORIDE AND ACETAMINOPHEN 1 TABLET: 7.5; 325 TABLET ORAL at 22:16

## 2022-08-03 RX ADMIN — SODIUM CHLORIDE 2 G: 1 TABLET ORAL at 08:17

## 2022-08-03 RX ADMIN — HYDROMORPHONE HYDROCHLORIDE 1 MG: 1 INJECTION, SOLUTION INTRAMUSCULAR; INTRAVENOUS; SUBCUTANEOUS at 20:11

## 2022-08-03 RX ADMIN — TICAGRELOR 90 MG: 90 TABLET ORAL at 20:11

## 2022-08-03 RX ADMIN — BUSPIRONE HYDROCHLORIDE 10 MG: 10 TABLET ORAL at 08:17

## 2022-08-03 RX ADMIN — ASPIRIN 81 MG CHEWABLE TABLET 81 MG: 81 TABLET CHEWABLE at 08:56

## 2022-08-03 RX ADMIN — NIMODIPINE 60 MG: 30 CAPSULE, LIQUID FILLED ORAL at 14:16

## 2022-08-03 RX ADMIN — LISINOPRIL 5 MG: 5 TABLET ORAL at 08:56

## 2022-08-03 RX ADMIN — NIMODIPINE 60 MG: 30 CAPSULE, LIQUID FILLED ORAL at 01:50

## 2022-08-03 RX ADMIN — OXYCODONE HYDROCHLORIDE AND ACETAMINOPHEN 1 TABLET: 7.5; 325 TABLET ORAL at 01:50

## 2022-08-03 RX ADMIN — HYDROMORPHONE HYDROCHLORIDE 1 MG: 1 INJECTION, SOLUTION INTRAMUSCULAR; INTRAVENOUS; SUBCUTANEOUS at 16:03

## 2022-08-03 RX ADMIN — SODIUM CHLORIDE 100 ML/HR: 3 INJECTION, SOLUTION INTRAVENOUS at 05:08

## 2022-08-03 RX ADMIN — OXYCODONE HYDROCHLORIDE AND ACETAMINOPHEN 1 TABLET: 7.5; 325 TABLET ORAL at 10:12

## 2022-08-03 RX ADMIN — SODIUM CHLORIDE 2 G: 1 TABLET ORAL at 12:04

## 2022-08-03 RX ADMIN — NIMODIPINE 60 MG: 30 CAPSULE, LIQUID FILLED ORAL at 22:16

## 2022-08-03 RX ADMIN — HYDROMORPHONE HYDROCHLORIDE 1 MG: 1 INJECTION, SOLUTION INTRAMUSCULAR; INTRAVENOUS; SUBCUTANEOUS at 03:48

## 2022-08-03 RX ADMIN — SODIUM CHLORIDE 2 G: 1 TABLET ORAL at 18:02

## 2022-08-03 NOTE — PLAN OF CARE
Goal Outcome Evaluation:  Plan of Care Reviewed With: patient        Progress: no change  Outcome Evaluation: NIH 0. VSS. SB. RA. adequate UOP. NA levels Q6. afeb.

## 2022-08-03 NOTE — SIGNIFICANT NOTE
08/03/22 1523   SLP Deferred Reason   SLP Deferred Reason Patient unavailable for treatment  (Attempted to see patient this pm for slc tx. Patient with PT. Will reattempt as able.)

## 2022-08-03 NOTE — PLAN OF CARE
Goal Outcome Evaluation:  Plan of Care Reviewed With: (P) patient        Progress: (P) improving  Outcome Evaluation: (P) Pt with good progress towards PT mobility goals. Pt demo's improved motivation and ambulatory distance. Ambulation of 400' with SBA and BUE support was well tolerated. Cues required for safety awareness. Pt is limited by impulsivity during transfers. Pt will continue to benefit from skilled IPPT. PT rec d/c home with assist.

## 2022-08-03 NOTE — PROGRESS NOTES
"NEUROSURGERY PROGRESS NOTE    Chief Complaint: Ruptured basilar artery aneurysm    Subjective: Stable overnight.    Objective    Vital Signs: Blood pressure 169/85, pulse 56, temperature 97.6 °F (36.4 °C), temperature source Oral, resp. rate 16, height 170 cm (66.93\"), weight 102 kg (225 lb 5 oz), last menstrual period 05/08/2018, SpO2 98 %, not currently breastfeeding.    Physical Exam  Awake, alert and oriented x 3  Opens eyes spont  Pupils 3 mm rx bilat  Extraocular muscles intact bilaterally  Face symmetric bilaterally  Tongue midline  5/5 in all 4 ext  No pronator drift    Intake/Output:     Intake/Output Summary (Last 24 hours) at 8/3/2022 1218  Last data filed at 8/3/2022 1012  Gross per 24 hour   Intake 5289.3 ml   Output 4615 ml   Net 674.3 ml       Current Medications:   Current Facility-Administered Medications:   •  acetaminophen (TYLENOL) tablet 325 mg, 325 mg, Oral, Daily, Duran Willis MD, 325 mg at 08/03/22 0856  •  acetaminophen (TYLENOL) tablet 650 mg, 650 mg, Oral, Q4H PRN, Duran Willis MD, 650 mg at 08/01/22 0356  •  aspirin chewable tablet 81 mg, 81 mg, Oral, Daily, Duran Willis MD, 81 mg at 08/03/22 0856  •  sennosides-docusate (PERICOLACE) 8.6-50 MG per tablet 2 tablet, 2 tablet, Oral, BID, 2 tablet at 08/03/22 0857 **AND** polyethylene glycol (MIRALAX) packet 17 g, 17 g, Oral, Daily, 17 g at 08/02/22 1215 **AND** bisacodyl (DULCOLAX) EC tablet 5 mg, 5 mg, Oral, Daily PRN **AND** bisacodyl (DULCOLAX) suppository 10 mg, 10 mg, Rectal, Daily PRN, Case, Mary V., DO  •  busPIRone (BUSPAR) tablet 10 mg, 10 mg, Oral, TID PRN, Case, Mary V., DO, 10 mg at 08/03/22 0817  •  dexamethasone (DECADRON) injection 4 mg, 4 mg, Intravenous, Q6H, Laisha Siddiqui PA-C, 4 mg at 08/03/22 1012  •  dextrose (D50W) (25 g/50 mL) IV injection 25 g, 25 g, Intravenous, Q15 Min PRN, Laisha Siddiqui PA-C  •  dextrose (GLUTOSE) oral gel 15 g, 15 g, Oral, Q15 Min PRN, Hunt, " Laisha ROMERO PA-C  •  enalaprilat (VASOTEC) injection 1.25 mg, 1.25 mg, Intravenous, Q6H PRN, Duran Willis MD, 1.25 mg at 07/29/22 2112  •  glucagon (human recombinant) (GLUCAGEN DIAGNOSTIC) injection 1 mg, 1 mg, Intramuscular, Q15 Min PRN, Laisha Siddiqui PA-C  •  hydrALAZINE (APRESOLINE) injection 10 mg, 10 mg, Intravenous, Q4H PRN, Duran Willis MD, 10 mg at 07/30/22 0040  •  HYDROmorphone (DILAUDID) injection 1 mg, 1 mg, Intravenous, Q4H PRN, Duran Willis MD, 1 mg at 08/03/22 0817  •  insulin detemir (LEVEMIR) injection 5 Units, 5 Units, Subcutaneous, Daily, Case, Mary V., DO, 5 Units at 08/03/22 0857  •  Insulin Lispro (humaLOG) injection 0-7 Units, 0-7 Units, Subcutaneous, TID AC, Laisha Siddiqui PA-C, 2 Units at 08/02/22 1632  •  labetalol (NORMODYNE,TRANDATE) injection 10 mg, 10 mg, Intravenous, Q10 Min PRN, Duran Willis MD  •  levETIRAcetam (KEPPRA) tablet 500 mg, 500 mg, Oral, BID, Duran Willis MD, 500 mg at 08/03/22 0856  •  lisinopril (PRINIVIL,ZESTRIL) tablet 5 mg, 5 mg, Oral, Q24H, Duran Willis MD, 5 mg at 08/03/22 0856  •  Magnesium Sulfate 2 gram Bolus, followed by 8 gram infusion (total Mg dose 10 grams)- Mg less than or equal to 1mg/dL, 2 g, Intravenous, PRN **OR** Magnesium Sulfate 2 gram / 50mL Infusion (GIVE X 3 BAGS TO EQUAL 6GM TOTAL DOSE) - Mg 1.1 - 1.5 mg/dl, 2 g, Intravenous, PRN **OR** Magnesium Sulfate 4 gram infusion- Mg 1.6-1.9 mg/dL, 4 g, Intravenous, PRN, Laisha Siddiqui PA-C, Last Rate: 25 mL/hr at 08/03/22 0615, 4 g at 08/03/22 0615  •  niCARdipine (CARDENE) 20 mg in 200 mL NS infusion, 5-15 mg/hr, Intravenous, Titrated, Duran Willis MD, Held at 07/31/22 1439  •  nicotine (NICODERM CQ) 21 MG/24HR patch 1 patch, 1 patch, Transdermal, Q24H, Laisha Siddiqui PA-C, 1 patch at 08/03/22 0857  •  niMODipine (NIMOTOP) capsule 60 mg, 60 mg, Oral, Q4H, Duran Willis MD, 60 mg at 08/03/22  1012  •  ondansetron (ZOFRAN) injection 4 mg, 4 mg, Intravenous, Q6H PRN, Duran Willis MD, 4 mg at 08/01/22 1223  •  oxyCODONE-acetaminophen (PERCOCET) 7.5-325 MG per tablet 1 tablet, 1 tablet, Oral, Q4H PRN, Duran Willis MD, 1 tablet at 08/03/22 1012  •  pantoprazole (PROTONIX) EC tablet 40 mg, 40 mg, Oral, Q AM, Case, Mary V., DO, 40 mg at 08/03/22 0616  •  simethicone (MYLICON) chewable tablet 80 mg, 80 mg, Oral, 4x Daily PRN, Case, Mary V., DO  •  sodium chloride (HYPERTONIC) 3 % infusion, 100 mL/hr, Intravenous, Continuous, Brannon Barrientos MD, Last Rate: 100 mL/hr at 08/03/22 1012, 100 mL/hr at 08/03/22 1012  •  sodium chloride 0.9 % infusion, 50 mL/hr, Intravenous, Continuous, Brannon Barrientos MD, Last Rate: 50 mL/hr at 08/03/22 0500, 50 mL/hr at 08/03/22 0500  •  sodium chloride tablet 2 g, 2 g, Oral, TID With Meals, Laisha Siddiqui PA-C, 2 g at 08/03/22 1204  •  ticagrelor (BRILINTA) tablet 90 mg, 90 mg, Oral, BID, Duran Willis MD, 90 mg at 08/03/22 0857     Laboratory Results:       Lab 08/01/22  0501   WBC 22.15*   HEMOGLOBIN 10.4*   HEMATOCRIT 33.6*   PLATELETS 437   NEUTROS ABS 19.85*   IMMATURE GRANS (ABS) 0.28*   LYMPHS ABS 0.70   MONOS ABS 1.29*   EOS ABS 0.00   MCV 82.2         Lab 08/03/22  0505 08/02/22  2324 08/02/22  1832 08/02/22  1208 08/02/22  0540 08/01/22  1304 08/01/22  0501 07/31/22  1830 07/31/22  1415 07/31/22  0505 07/30/22  2213   SODIUM 140  140 141 142 140 137   < > 138   < > 137 138 137   POTASSIUM 4.2  --   --   --  4.2  --  4.6  --   --  4.3 4.3   CHLORIDE 106  --   --   --  104  --  106  --   --  103 102   CO2 25.0  --   --   --  24.0  --  26.0  --   --  28.0 24.0   ANION GAP 9.0  --   --   --  9.0  --  6.0  --   --  7.0 11.0   BUN 13  --   --   --  13  --  15  --   --  16 20   CREATININE 0.48*  --   --   --  0.57  --  0.49*  --   --  0.48* 0.51*   EGFR 118.5  --   --   --  113.7  --  117.9  --   --  118.5 116.8   GLUCOSE 118*   --   --   --  218*  --  146*  --   --  133* 188*   CALCIUM 7.7*  --   --   --  7.9*  --  8.1*  --   --  8.1* 8.6   MAGNESIUM 1.7  --   --   --  1.8  --  2.1  --  1.9 2.7* 1.7   PHOSPHORUS  --   --   --   --   --   --  3.2  --   --   --   --     < > = values in this interval not displayed.                         Brief Urine Lab Results     None        Microbiology Results (last 10 days)     Procedure Component Value - Date/Time    COVID PRE-OP / PRE-PROCEDURE SCREENING ORDER (NO ISOLATION) - Swab, Nasopharynx [[id number]]  (Normal) Collected: 07/25/22 1559    Lab Status: Final result Specimen: Swab from Nasopharynx Updated: 07/25/22 1708    Narrative:      The following orders were created for panel order COVID PRE-OP / PRE-PROCEDURE SCREENING ORDER (NO ISOLATION) - Swab, Nasopharynx.  Procedure                               Abnormality         Status                     ---------                               -----------         ------                     Respiratory Panel PCR w/...[[id number]]  Normal              Final result                 Please view results for these tests on the individual orders.    Respiratory Panel PCR w/COVID-19(SARS-CoV-2) MAURICE/ROBERTO/HERBERT/PAD/COR/MAD/JAYLYN In-House, NP Swab in UTM/VTM, 3-4 HR TAT - Swab, Nasopharynx [[id number]]  (Normal) Collected: 07/25/22 1559    Lab Status: Final result Specimen: Swab from Nasopharynx Updated: 07/25/22 1708     ADENOVIRUS, PCR Not Detected     Coronavirus 229E Not Detected     Coronavirus HKU1 Not Detected     Coronavirus NL63 Not Detected     Coronavirus OC43 Not Detected     COVID19 Not Detected     Human Metapneumovirus Not Detected     Human Rhinovirus/Enterovirus Not Detected     Influenza A PCR Not Detected     Influenza B PCR Not Detected     Parainfluenza Virus 1 Not Detected     Parainfluenza Virus 2 Not Detected     Parainfluenza Virus 3 Not Detected     Parainfluenza Virus 4 Not Detected     RSV, PCR Not Detected     Bordetella pertussis pcr Not  Detected     Bordetella parapertussis PCR Not Detected     Chlamydophila pneumoniae PCR Not Detected     Mycoplasma pneumo by PCR Not Detected    Narrative:      In the setting of a positive respiratory panel with a viral infection PLUS a negative procalcitonin without other underlying concern for bacterial infection, consider observing off antibiotics or discontinuation of antibiotics and continue supportive care. If the respiratory panel is positive for atypical bacterial infection (Bordetella pertussis, Chlamydophila pneumoniae, or Mycoplasma pneumoniae), consider antibiotic de-escalation to target atypical bacterial infection.           Diagnostic Imaging: I reviewed and independently interpreted the new imaging.     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  -Neurologically, patient has remained stable.  Her TCD's continue to mildly increase. Cont her 3% at 100 mls per hour and NS at 50 ml/hr.  Sodium goal between 145 and 155. We will continue to monitor her neurological exam closely.  Continue Keppra and nimodipine. Appreciate ICU assistance.         Brannon Barrientos MD  08/03/22  12:18 EDT

## 2022-08-03 NOTE — PROGRESS NOTES
INTENSIVIST   PROGRESS NOTE     Hospital:  LOS: 9 days     Ms. Mary Celis, 46 y.o. female is followed for a Chief Complaint of: SAH, Headache      Subjective   S     Interval History:  No acute events. Continues to have a headache.        The patient's relevant past medical, surgical and social history were reviewed and updated in Epic as appropriate.      ROS:   Constitutional: Negative for fever.   Respiratory: Negative for dyspnea.   Cardiovascular: Negative for chest pain.   Gastrointestinal: Negative for  nausea, vomiting and diarrhea.     Objective   O     Vitals:  Temp  Min: 97.6 °F (36.4 °C)  Max: 98.9 °F (37.2 °C)  BP  Min: 162/77  Max: 189/88  Pulse  Min: 47  Max: 64  Resp  Min: 16  Max: 18  SpO2  Min: 91 %  Max: 99 % No data recorded    Intake/Ouptut 24 hrs (7:00AM - 6:59 AM)  Intake & Output (last 3 days)       07/31 0701  08/01 0700 08/01 0701  08/02 0700 08/02 0701  08/03 0700 08/03 0701  08/04 0700    P.O. 1090 1300 2195 737    I.V. (mL/kg) 1895.8 (19.7) 2593.5 (26.3) 3678.4 (36.1) 439 (4.3)    IV Piggyback 143       Total Intake(mL/kg) 3128.8 (32.6) 3893.5 (39.4) 5873.4 (57.6) 1176 (11.5)    Urine (mL/kg/hr) 3200 (1.4) 3580 (1.5) 4725 (1.9) 765 (1.3)    Total Output 3200 3580 4725 765    Net -71.2 +313.5 +1148.4 +411                  Medications (drips):  niCARdipine, Last Rate: Stopped (07/31/22 1439)  sodium chloride, Last Rate: 100 mL/hr (08/03/22 1012)  sodium chloride, Last Rate: 50 mL/hr (08/03/22 0500)          Physical Examination  Telemetry:  Normal sinus rhythm.    Constitutional:  No acute distress.  Resting in bed in the dark.    Eyes: No scleral icterus.   PERRL, EOM intact.    Neck:  Supple, FROM   Cardiovascular: Normal rate, regular and rhythm. Normal heart sounds.  No murmurs, gallop or rub.   Respiratory: No respiratory distress. Normal respiratory effort.  Normal breath sounds  Clear to auscultation   Abdominal:  Soft. No masses. Nontender. No distension. No HSM.   Extremities:  No digital cyanosis. No clubbing.  No peripheral edema.   Skin: No rashes, lesions or ulcers   Neurological:   Alert and Oriented to person, place, and time.              Results from last 7 days   Lab Units 08/01/22  0501   WBC 10*3/mm3 22.15*   HEMOGLOBIN g/dL 10.4*   MCV fL 82.2   PLATELETS 10*3/mm3 437     Results from last 7 days   Lab Units 08/03/22  0505 08/02/22  2324 08/02/22  1832 08/02/22  1208 08/02/22  0540 08/01/22  1304 08/01/22  0501   SODIUM mmol/L 140  140 141 142   < > 137   < > 138   POTASSIUM mmol/L 4.2  --   --   --  4.2  --  4.6   CO2 mmol/L 25.0  --   --   --  24.0  --  26.0   CREATININE mg/dL 0.48*  --   --   --  0.57  --  0.49*   GLUCOSE mg/dL 118*  --   --   --  218*  --  146*   MAGNESIUM mg/dL 1.7  --   --   --  1.8  --  2.1   PHOSPHORUS mg/dL  --   --   --   --   --   --  3.2    < > = values in this interval not displayed.     Estimated Creatinine Clearance: 179.4 mL/min (A) (by C-G formula based on SCr of 0.48 mg/dL (L)).              Images:  Imaging Results (Last 24 Hours)     ** No results found for the last 24 hours. **            Results: Reviewed.  I reviewed the patient's new laboratory and imaging results.  I independently reviewed the patient's new images.    Medications: Reviewed.    Assessment & Plan   A / P     Ms. Celis is a 47yo F who presented to [facility] for headache. Imaging revealed a large subarachnoid hemorrhage with intraventricular involvement but no evidence of aneurysm and was transferred to [facility] on 7/25/22 for neurosurgical evaluation. Cerebral angiogram on 7/26 was negative for AVM or aneurysm. UDS was positive for methamphetamines and opiates. She was started on Nimotop, Keppra and daily TCD. 3% Saline was started.     She was taken back to the cath lab by Dr. 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     TCDs show increased velocities.     LUE duplex from 8/2/22 shows a superficial thrombophlebitis.     Nutrition:    Diet Regular  Advance Directives:   Code Status and Medical Interventions:   Ordered at: 07/26/22 1544     Level Of Support Discussed With:    Patient     Code Status (Patient has no pulse and is not breathing):    CPR (Attempt to Resuscitate)     Medical Interventions (Patient has pulse or is breathing):    Full Support       Active Hospital Problems    Diagnosis    • **SAH     • Smoker    • Class 1 obesity in adult    • Illicit drug use (UDS + meth/amphetamines, opiates, and oxycodone)     • Essential hypertension    • Iron deficiency anemia due to chronic blood loss        Assessment / Plan:    1. 3% hypertonic saline. Goal -155.   2. SBP goal .   3. Daily TCDs  4. Nimotop, Decadron and Keppra  5. ASA and Brilinta  6. Continue current insulin regimen.   7. LUE duplex with superficial thrombophlebitis. This does not require anticoagulation.    8. Replace magnesium.   9. AM labs  10. To remain in ICU.     High level of risk due to:  severe exacerbation of chronic illness and illness with threat to life or bodily function.    Plan of care and goals reviewed during interdisciplinary rounds.  I discussed the patient's findings and my recommendations with patient and nursing staff        Mary Batista, DO    Intensive Care Medicine and Pulmonary Medicine

## 2022-08-03 NOTE — PLAN OF CARE
Goal Outcome Evaluation:  Plan of Care Reviewed With: patient        Progress: no change  Outcome Evaluation: NIH 0. No neuro defiPatient continues to c/o HA which improves with PRN meds. MN sodium was 141. 3% saline infusing at 100 ml/hr and NS infusing at 50 ml/hr. 's to 180's afebrile. Net I&O + 450.

## 2022-08-03 NOTE — THERAPY TREATMENT NOTE
Patient Name: Mary Celis  : 1975    MRN: [id number]                              Today's Date: 8/3/2022       Admit Date: 2022    Visit Dx:     ICD-10-CM ICD-9-CM   1. SAH (subarachnoid hemorrhage) (HCC)  I60.9 430   2. Essential hypertension  I10 401.9   3. Iron malabsorption  K90.9 579.8   4. Right upper quadrant abdominal pain  R10.11 789.01   5. Iron deficiency anemia due to chronic blood loss  D50.0 280.0   6. Acute nonintractable headache, unspecified headache type  R51.9 784.0   7. Cognitive communication deficit  R41.841 799.52     Patient Active Problem List   Diagnosis   • Iron deficiency anemia due to chronic blood loss   • Right upper quadrant abdominal pain   • Iron malabsorption   • SAH    • Essential hypertension   • Smoker   • Class 1 obesity in adult   • Illicit drug use (UDS + meth/amphetamines, opiates, and oxycodone)      Past Medical History:   Diagnosis Date   • Abnormal Pap smear of cervix    • Anemia    • Arthritis    • Arthritis    • Body piercing     EARS   • Cancer (HCC)     REPORTS PRE-CERVICAL CANCER   • Depression with anxiety    • Gout    • History of blood transfusion    • History of transfusion     REPORTS HAS HAD SEVERAL TRANSFUSIONS, NO REACTIONS   • IBS (irritable bowel syndrome)    • Panic attack    • Tattoo     LEFT FOREARM   • Wears glasses      Past Surgical History:   Procedure Laterality Date   • CEREBRAL ANGIOGRAM N/A 2022    Procedure: Cerebral angiogram;  Surgeon: Spencer Hauser MD;  Location: [facility] INVASIVE LOCATION;  Service: Interventional Radiology;  Laterality: N/A;   • CHOLECYSTECTOMY     • CHOLECYSTECTOMY WITH INTRAOPERATIVE CHOLANGIOGRAM N/A 2017    Procedure: CHOLECYSTECTOMY LAPAROSCOPIC INTRAOPERATIVE CHOLANGIOGRAM;  Surgeon: Albaro Lopez MD;  Location: [facility] OR;  Service:    • COLONOSCOPY N/A 2017    Procedure: COLONOSCOPY;  Surgeon: Albaro Lopez MD;  Location: [facility] ENDOSCOPY;  Service:    • D & C HYSTEROSCOPY       VITAL SIGNS UNSTABLE WITH THIS PROCEDURE   • ENDOSCOPY N/A 7/28/2017    Procedure: ESOPHAGOGASTRODUODENOSCOPY WITH BIOPSIES;  Surgeon: Albaro Lopez MD;  Location: [facility] ENDOSCOPY;  Service:    • INTERVENTIONAL RADIOLOGY PROCEDURE Bilateral 7/31/2022    Procedure: CAROTID CEREBRAL ANGIOGRAM BILATERAL;  Surgeon: Duran Willis MD;  Location: [facility] INVASIVE LOCATION;  Service: Interventional Radiology;  Laterality: Bilateral;   • TUBAL ABDOMINAL LIGATION     • WISDOM TOOTH EXTRACTION        General Information     Row Name 08/03/22 1543          Physical Therapy Time and Intention    Document Type therapy note (daily note) (P)   -AB     Mode of Treatment physical therapy (P)   -AB     Row Name 08/03/22 1543          General Information    Patient Profile Reviewed yes (P)   -AB     Existing Precautions/Restrictions fall (P)   -AB     Barriers to Rehab none identified (P)   -AB     Row Name 08/03/22 1543          Cognition    Orientation Status (Cognition) oriented x 4 (P)   -AB     Row Name 08/03/22 1543          Safety Issues, Functional Mobility    Safety Issues Affecting Function (Mobility) awareness of need for assistance;safety precaution awareness;safety precautions follow-through/compliance;insight into deficits/self-awareness (P)   -AB     Impairments Affecting Function (Mobility) balance;coordination;endurance/activity tolerance;pain (P)   -AB           User Key  (r) = Recorded By, (t) = Taken By, (c) = Cosigned By    Initials Name Provider Type    AB Jackie Tirado, PT Student PT Student               Mobility     Row Name 08/03/22 1543          Bed Mobility    Bed Mobility supine-sit;sit-supine (P)   -AB     Supine-Sit [place] (Bed Mobility) moderate assist (50% patient effort);1 person assist (P)   -AB     Sit-Supine [place] (Bed Mobility) standby assist;verbal cues;nonverbal cues (demo/gesture) (P)   -AB     Comment, (Bed Mobility) Pt required mod A for sup to sit secondary  to reported stomach pain. (P)   -AB     Row Name 08/03/22 1543          Transfers    Comment, (Transfers) Pt highly impulsive. (P)   -AB     Row Name 08/03/22 1543          Sit-Stand Transfer    Sit-Stand [place] (Transfers) standby assist;verbal cues;nonverbal cues (demo/gesture) (P)   -AB     Row Name 08/03/22 1543          Gait/Stairs (Locomotion)    [place] Level (Gait) verbal cues;1 person assist;standby assist (P)   -AB     Assistive Device (Gait) other (see comments) (P)   BUE supported on tele monitor.  -AB     Distance in Feet (Gait) 400 (P)   -AB     Deviations/Abnormal Patterns (Gait) base of support, narrow;benja decreased;stride length decreased (P)   -AB     Bilateral Gait Deviations forward flexed posture;heel strike decreased (P)   -AB     Comment, (Gait/Stairs) Pt demo's step through gait pattern with decreased benja and stride length. Cues for safety awareness required. (P)   -AB           User Key  (r) = Recorded By, (t) = Taken By, (c) = Cosigned By    Initials Name Provider Type    Jackie Salter, PT Student PT Student               Obj/Interventions     Row Name 08/03/22 1546          Balance    Balance Assessment sitting static balance;sitting dynamic balance;standing static balance;standing dynamic balance (P)   -AB     Static Sitting Balance independent (P)   -AB     Dynamic Sitting Balance independent (P)   -AB     Position, Sitting Balance unsupported;sitting edge of bed (P)   -AB     Static Standing Balance standby assist (P)   -AB     Dynamic Standing Balance standby assist (P)   -AB     Position/Device Used, Standing Balance supported (P)   -AB     Balance Interventions sitting;standing;sit to stand;supported;static;dynamic (P)   -AB     Comment, Balance No LOB (P)   -AB           User Key  (r) = Recorded By, (t) = Taken By, (c) = Cosigned By    Initials Name Provider Type    Jackie Salter, PT Student PT Student               Goals/Plan    No documentation.                 Clinical Impression     Row Name 08/03/22 1547          Pain    Pretreatment Pain Rating 8/10 (P)   -AB     Posttreatment Pain Rating 8/10 (P)   -AB     Pain Location generalized (P)   -AB     Pain Location - abdomen (P)   -AB     Pre/Posttreatment Pain Comment Tolerated (P)   -AB     Pain Intervention(s) Repositioned;Ambulation/increased activity;Nursing Notified (P)   -AB     Additional Documentation Pain Scale: Numbers Pre/Post-Treatment (Group) (P)   -AB     Row Name 08/03/22 1547          Plan of Care Review    Plan of Care Reviewed With patient (P)   -AB     Progress improving (P)   -AB     Outcome Evaluation Pt with good progress towards PT mobility goals. Pt demo's improved motivation and ambulatory distance. Ambulation of 400' with SBA and BUE support was well tolerated. Cues required for safety awareness. Pt is limited by impulsivity during transfers. Pt will continue to benefit from skilled IPPT. PT rec d/c home with assist. (P)   -AB     Row Name 08/03/22 1547          Vital Signs    Pre Systolic BP Rehab 173 (P)   -AB     Pre Treatment Diastolic BP 68 (P)   -AB     Post Systolic BP Rehab 164 (P)   -AB     Post Treatment Diastolic BP 76 (P)   -AB     Pretreatment Heart Rate (beats/min) 68 (P)   -AB     Posttreatment Heart Rate (beats/min) 65 (P)   -AB     Pre SpO2 (%) 96 (P)   -AB     O2 Delivery Pre Treatment room air (P)   -AB     O2 Delivery Intra Treatment room air (P)   -AB     Post SpO2 (%) 98 (P)   -AB     O2 Delivery Post Treatment room air (P)   -AB     Pre Patient Position Supine (P)   -AB     Intra Patient Position Standing (P)   -AB     Post Patient Position Supine (P)   -AB     Row Name 08/03/22 1547          Positioning and Restraints    Pre-Treatment Position in bed (P)   -AB     Post Treatment Position bed (P)   -AB     In Bed notified nsg;side lying left;supine;call light within reach;encouraged to call for assist;exit alarm on;patient within staff view (P)   -AB           User  Key  (r) = Recorded By, (t) = Taken By, (c) = Cosigned By    Initials Name Provider Type    Jackie Salter, PT Student PT Student               Outcome Measures     Row Name 08/03/22 1549 08/03/22 0800       How much help from another person do you currently need...    Turning from your back to your side while in flat bed without using bedrails? 4 (P)   -AB 4  -BS    Moving from lying on back to sitting on the side of a flat bed without bedrails? 2 (P)   -AB 4  -BS    Moving to and from a bed to a chair (including a wheelchair)? 4 (P)   -AB 4  -BS    Standing up from a chair using your arms (e.g., wheelchair, bedside chair)? 4 (P)   -AB 3  -BS    Climbing 3-5 steps with a railing? 3 (P)   -AB 3  -BS    To walk in hospital room? 4 (P)   -AB 3  -BS    AM-PAC 6 Clicks Score (PT) 21 (P)   -AB 21  -BS    Highest level of mobility 6 --> Walked 10 steps or more (P)   -AB 6 --> Walked 10 steps or more  -BS          User Key  (r) = Recorded By, (t) = Taken By, (c) = Cosigned By    Initials Name Provider Type    Dori Franz, RN Registered Nurse    Jackie Salter, PT Student PT Student                             Physical Therapy Education                 Title: PT OT SLP Therapies (In Progress)     Topic: Physical Therapy (Done)     Point: Mobility training (Done)     Learning Progress Summary           Patient Acceptance, E,D, VU,NR by AB at 8/3/2022 1550      Show all documentation for this point (4)                 Point: Home exercise program (Done)     Learning Progress Summary           Patient Acceptance, E, VU,NR by KG at 7/29/2022 1137                   Point: Body mechanics (Done)     Learning Progress Summary           Patient Acceptance, E,D, VU,NR by AB at 8/3/2022 1550      Show all documentation for this point (4)                 Point: Precautions (Done)     Learning Progress Summary           Patient Acceptance, E,D, VU,NR by AB at 8/3/2022 1550      Show all documentation for this point (4)                              User Key     Initials Effective Dates Name Provider Type Discipline    KG 05/22/20 -  Stefani Ventura, PT Physical Therapist PT    AB 05/23/22 -  Jackie Tirado, PT Student PT Student PT              PT Recommendation and Plan  Planned Therapy Interventions (PT): balance training, bed mobility training, gait training, home exercise program, patient/family education, postural re-education, stair training, strengthening, transfer training  Plan of Care Reviewed With: (P) patient  Progress: (P) improving  Outcome Evaluation: (P) Pt with good progress towards PT mobility goals. Pt demo's improved motivation and ambulatory distance. Ambulation of 400' with SBA and BUE support was well tolerated. Cues required for safety awareness. Pt is limited by impulsivity during transfers. Pt will continue to benefit from skilled IPPT. PT rec d/c home with assist.     Time Calculation:    PT Charges     Row Name 08/03/22 1551             Time Calculation    PT Received On 08/03/22 (P)   -AB      PT Goal Re-Cert Due Date 08/06/22 (P)   -AB              Time Calculation- PT    Total Timed Code Minutes- PT 23 minute(s) (P)   -AB              Timed Charges    83658 - Gait Training Minutes  23 (P)   -AB              Total Minutes    Timed Charges Total Minutes 23 (P)   -AB       Total Minutes 23 (P)   -AB            User Key  (r) = Recorded By, (t) = Taken By, (c) = Cosigned By    Initials Name Provider Type    AB Jackie Tirado, PT Student PT Student              Therapy Charges for Today     Code Description Service Date Service Provider Modifiers Qty    [id number] HC GAIT TRAINING EA 15 MIN 8/3/2022 Jackie Tirado, PT Student GP 2          PT G-Codes  Outcome Measure Options: AM-PAC 6 Clicks Basic Mobility (PT)  AM-PAC 6 Clicks Score (PT): (P) 21  AM-PAC 6 Clicks Score (OT): 20  Modified Sree Scale: 1 - No significant disability despite symptoms.  Able to carry out all usual duties and  activities.    THANH RIVAS, PT Student  8/3/2022

## 2022-08-04 ENCOUNTER — APPOINTMENT (OUTPATIENT)
Dept: CARDIOLOGY | Facility: HOSPITAL | Age: 47
End: 2022-08-04

## 2022-08-04 LAB
ANION GAP SERPL CALCULATED.3IONS-SCNC: 10 MMOL/L (ref 5–15)
BH CV VAS TCD LEFT ACA: 121 CM/SEC
BH CV VAS TCD LEFT ACA: 92 CM/SEC
BH CV VAS TCD LEFT DISTAL M1: 172 CM/SEC
BH CV VAS TCD LEFT DISTAL M1: 92 CM/SEC
BH CV VAS TCD LEFT MID M1: 149 CM/SEC
BH CV VAS TCD LEFT MID M1: 196 CM/SEC
BH CV VAS TCD LEFT P1: 44 CM/SEC
BH CV VAS TCD LEFT P1: 50 CM/SEC
BH CV VAS TCD LEFT P2: 39 CM/SEC
BH CV VAS TCD LEFT P2: 53 CM/SEC
BH CV VAS TCD LEFT PROXIMAL M1: 111 CM/SEC
BH CV VAS TCD LEFT PROXIMAL M1: 82 CM/SEC
BH CV VAS TCD LEFT TERMINAL ICA: 31 CM/SEC
BH CV VAS TCD LEFT TERMINAL ICA: 84 CM/SEC
BH CV VAS TCD RIGHT ACA: 63 CM/SEC
BH CV VAS TCD RIGHT ACA: 64 CM/SEC
BH CV VAS TCD RIGHT DISTAL M1: 46 CM/SEC
BH CV VAS TCD RIGHT DISTAL M1: 56 CM/SEC
BH CV VAS TCD RIGHT MID M1: 50 CM/SEC
BH CV VAS TCD RIGHT MID M1: 66 CM/SEC
BH CV VAS TCD RIGHT P1: 16 CM/SEC
BH CV VAS TCD RIGHT P1: 22 CM/SEC
BH CV VAS TCD RIGHT P2: 44 CM/SEC
BH CV VAS TCD RIGHT P2: 46 CM/SEC
BH CV VAS TCD RIGHT PROXIMAL M1: 126 CM/SEC
BH CV VAS TCD RIGHT PROXIMAL M1: 147 CM/SEC
BH CV VAS TCD RIGHT TERMINAL ICA: 20 CM/SEC
BH CV VAS TCD RIGHT TERMINAL ICA: 48 CM/SEC
BUN SERPL-MCNC: 14 MG/DL (ref 6–20)
BUN/CREAT SERPL: 25 (ref 7–25)
CALCIUM SPEC-SCNC: 8.1 MG/DL (ref 8.6–10.5)
CHLORIDE SERPL-SCNC: 107 MMOL/L (ref 98–107)
CO2 SERPL-SCNC: 23 MMOL/L (ref 22–29)
CREAT SERPL-MCNC: 0.56 MG/DL (ref 0.57–1)
EGFRCR SERPLBLD CKD-EPI 2021: 114.1 ML/MIN/1.73
GLUCOSE BLDC GLUCOMTR-MCNC: 152 MG/DL (ref 70–130)
GLUCOSE BLDC GLUCOMTR-MCNC: 183 MG/DL (ref 70–130)
GLUCOSE BLDC GLUCOMTR-MCNC: 203 MG/DL (ref 70–130)
GLUCOSE BLDC GLUCOMTR-MCNC: 75 MG/DL (ref 70–130)
GLUCOSE SERPL-MCNC: 233 MG/DL (ref 65–99)
MAGNESIUM SERPL-MCNC: 1.8 MG/DL (ref 1.6–2.6)
MAXIMAL PREDICTED HEART RATE: 174 BPM
MAXIMAL PREDICTED HEART RATE: 174 BPM
POTASSIUM SERPL-SCNC: 4.2 MMOL/L (ref 3.5–5.2)
SODIUM SERPL-SCNC: 140 MMOL/L (ref 136–145)
SODIUM SERPL-SCNC: 143 MMOL/L (ref 136–145)
STRESS TARGET HR: 148 BPM
STRESS TARGET HR: 148 BPM

## 2022-08-04 PROCEDURE — 80048 BASIC METABOLIC PNL TOTAL CA: CPT | Performed by: NEUROLOGICAL SURGERY

## 2022-08-04 PROCEDURE — 99233 SBSQ HOSP IP/OBS HIGH 50: CPT | Performed by: STUDENT IN AN ORGANIZED HEALTH CARE EDUCATION/TRAINING PROGRAM

## 2022-08-04 PROCEDURE — 25010000002 HYDROMORPHONE 1 MG/ML SOLUTION: Performed by: NEUROLOGICAL SURGERY

## 2022-08-04 PROCEDURE — 82962 GLUCOSE BLOOD TEST: CPT

## 2022-08-04 PROCEDURE — 84295 ASSAY OF SERUM SODIUM: CPT | Performed by: NEUROLOGICAL SURGERY

## 2022-08-04 PROCEDURE — 63710000001 INSULIN LISPRO (HUMAN) PER 5 UNITS: Performed by: PHYSICIAN ASSISTANT

## 2022-08-04 PROCEDURE — 83735 ASSAY OF MAGNESIUM: CPT | Performed by: NEUROLOGICAL SURGERY

## 2022-08-04 PROCEDURE — 63710000001 INSULIN DETEMIR PER 5 UNITS: Performed by: INTERNAL MEDICINE

## 2022-08-04 PROCEDURE — 0 MAGNESIUM SULFATE 4 GM/100ML SOLUTION: Performed by: PHYSICIAN ASSISTANT

## 2022-08-04 PROCEDURE — 25010000002 DEXAMETHASONE PER 1 MG: Performed by: PHYSICIAN ASSISTANT

## 2022-08-04 PROCEDURE — 99232 SBSQ HOSP IP/OBS MODERATE 35: CPT | Performed by: INTERNAL MEDICINE

## 2022-08-04 PROCEDURE — 93886 INTRACRANIAL COMPLETE STUDY: CPT

## 2022-08-04 RX ORDER — 3% SODIUM CHLORIDE 3 G/100ML
100 INJECTION, SOLUTION INTRAVENOUS CONTINUOUS
Status: DISPENSED | OUTPATIENT
Start: 2022-08-04 | End: 2022-08-05

## 2022-08-04 RX ADMIN — ACETAMINOPHEN 325MG 325 MG: 325 TABLET ORAL at 08:10

## 2022-08-04 RX ADMIN — SODIUM CHLORIDE 150 ML/HR: 3 INJECTION, SOLUTION INTRAVENOUS at 08:05

## 2022-08-04 RX ADMIN — MAGNESIUM SULFATE HEPTAHYDRATE 4 G: 40 INJECTION, SOLUTION INTRAVENOUS at 06:53

## 2022-08-04 RX ADMIN — SODIUM CHLORIDE 2 G: 1 TABLET ORAL at 18:08

## 2022-08-04 RX ADMIN — NIMODIPINE 60 MG: 30 CAPSULE, LIQUID FILLED ORAL at 13:58

## 2022-08-04 RX ADMIN — BUSPIRONE HYDROCHLORIDE 10 MG: 10 TABLET ORAL at 19:47

## 2022-08-04 RX ADMIN — SIMETHICONE 80 MG: 80 TABLET, CHEWABLE ORAL at 19:47

## 2022-08-04 RX ADMIN — NIMODIPINE 60 MG: 30 CAPSULE, LIQUID FILLED ORAL at 05:30

## 2022-08-04 RX ADMIN — OXYCODONE HYDROCHLORIDE AND ACETAMINOPHEN 1 TABLET: 7.5; 325 TABLET ORAL at 16:14

## 2022-08-04 RX ADMIN — HYDROMORPHONE HYDROCHLORIDE 1 MG: 1 INJECTION, SOLUTION INTRAMUSCULAR; INTRAVENOUS; SUBCUTANEOUS at 05:30

## 2022-08-04 RX ADMIN — LEVETIRACETAM 500 MG: 500 TABLET, FILM COATED ORAL at 08:10

## 2022-08-04 RX ADMIN — OXYCODONE HYDROCHLORIDE AND ACETAMINOPHEN 1 TABLET: 7.5; 325 TABLET ORAL at 08:11

## 2022-08-04 RX ADMIN — SENNOSIDES AND DOCUSATE SODIUM 2 TABLET: 50; 8.6 TABLET ORAL at 21:35

## 2022-08-04 RX ADMIN — SODIUM CHLORIDE 150 ML/HR: 3 INJECTION, SOLUTION INTRAVENOUS at 00:21

## 2022-08-04 RX ADMIN — POLYETHYLENE GLYCOL 3350 17 G: 17 POWDER, FOR SOLUTION ORAL at 08:10

## 2022-08-04 RX ADMIN — SODIUM CHLORIDE 150 ML/HR: 3 INJECTION, SOLUTION INTRAVENOUS at 03:47

## 2022-08-04 RX ADMIN — SODIUM CHLORIDE 2 G: 1 TABLET ORAL at 08:08

## 2022-08-04 RX ADMIN — HYDROMORPHONE HYDROCHLORIDE 1 MG: 1 INJECTION, SOLUTION INTRAMUSCULAR; INTRAVENOUS; SUBCUTANEOUS at 22:16

## 2022-08-04 RX ADMIN — OXYCODONE HYDROCHLORIDE AND ACETAMINOPHEN 1 TABLET: 7.5; 325 TABLET ORAL at 12:12

## 2022-08-04 RX ADMIN — TICAGRELOR 90 MG: 90 TABLET ORAL at 21:35

## 2022-08-04 RX ADMIN — DEXAMETHASONE SODIUM PHOSPHATE 4 MG: 4 INJECTION, SOLUTION INTRA-ARTICULAR; INTRALESIONAL; INTRAMUSCULAR; INTRAVENOUS; SOFT TISSUE at 09:59

## 2022-08-04 RX ADMIN — TICAGRELOR 90 MG: 90 TABLET ORAL at 08:11

## 2022-08-04 RX ADMIN — HYDROMORPHONE HYDROCHLORIDE 1 MG: 1 INJECTION, SOLUTION INTRAMUSCULAR; INTRAVENOUS; SUBCUTANEOUS at 09:55

## 2022-08-04 RX ADMIN — Medication 1 PATCH: at 08:14

## 2022-08-04 RX ADMIN — PANTOPRAZOLE SODIUM 40 MG: 40 TABLET, DELAYED RELEASE ORAL at 05:30

## 2022-08-04 RX ADMIN — DEXAMETHASONE SODIUM PHOSPHATE 4 MG: 4 INJECTION, SOLUTION INTRA-ARTICULAR; INTRALESIONAL; INTRAMUSCULAR; INTRAVENOUS; SOFT TISSUE at 16:06

## 2022-08-04 RX ADMIN — OXYCODONE HYDROCHLORIDE AND ACETAMINOPHEN 1 TABLET: 7.5; 325 TABLET ORAL at 02:58

## 2022-08-04 RX ADMIN — SODIUM CHLORIDE 150 ML/HR: 3 INJECTION, SOLUTION INTRAVENOUS at 20:13

## 2022-08-04 RX ADMIN — INSULIN LISPRO 2 UNITS: 100 INJECTION, SOLUTION INTRAVENOUS; SUBCUTANEOUS at 17:16

## 2022-08-04 RX ADMIN — DEXAMETHASONE SODIUM PHOSPHATE 4 MG: 4 INJECTION, SOLUTION INTRA-ARTICULAR; INTRALESIONAL; INTRAMUSCULAR; INTRAVENOUS; SOFT TISSUE at 21:35

## 2022-08-04 RX ADMIN — LISINOPRIL 5 MG: 5 TABLET ORAL at 08:11

## 2022-08-04 RX ADMIN — BUSPIRONE HYDROCHLORIDE 10 MG: 10 TABLET ORAL at 06:57

## 2022-08-04 RX ADMIN — INSULIN LISPRO 2 UNITS: 100 INJECTION, SOLUTION INTRAVENOUS; SUBCUTANEOUS at 08:14

## 2022-08-04 RX ADMIN — DEXAMETHASONE SODIUM PHOSPHATE 4 MG: 4 INJECTION, SOLUTION INTRA-ARTICULAR; INTRALESIONAL; INTRAMUSCULAR; INTRAVENOUS; SOFT TISSUE at 03:00

## 2022-08-04 RX ADMIN — OXYCODONE HYDROCHLORIDE AND ACETAMINOPHEN 1 TABLET: 7.5; 325 TABLET ORAL at 23:43

## 2022-08-04 RX ADMIN — OXYCODONE HYDROCHLORIDE AND ACETAMINOPHEN 1 TABLET: 7.5; 325 TABLET ORAL at 19:47

## 2022-08-04 RX ADMIN — NIMODIPINE 60 MG: 30 CAPSULE, LIQUID FILLED ORAL at 09:59

## 2022-08-04 RX ADMIN — NIMODIPINE 60 MG: 30 CAPSULE, LIQUID FILLED ORAL at 02:58

## 2022-08-04 RX ADMIN — SODIUM CHLORIDE 2 G: 1 TABLET ORAL at 12:13

## 2022-08-04 RX ADMIN — INSULIN DETEMIR 5 UNITS: 100 INJECTION, SOLUTION SUBCUTANEOUS at 21:37

## 2022-08-04 RX ADMIN — SENNOSIDES AND DOCUSATE SODIUM 2 TABLET: 50; 8.6 TABLET ORAL at 08:10

## 2022-08-04 RX ADMIN — ASPIRIN 81 MG CHEWABLE TABLET 81 MG: 81 TABLET CHEWABLE at 08:10

## 2022-08-04 RX ADMIN — HYDROMORPHONE HYDROCHLORIDE 1 MG: 1 INJECTION, SOLUTION INTRAMUSCULAR; INTRAVENOUS; SUBCUTANEOUS at 13:58

## 2022-08-04 RX ADMIN — NIMODIPINE 60 MG: 30 CAPSULE, LIQUID FILLED ORAL at 18:08

## 2022-08-04 RX ADMIN — SODIUM CHLORIDE 150 ML/HR: 3 INJECTION, SOLUTION INTRAVENOUS at 11:17

## 2022-08-04 RX ADMIN — NIMODIPINE 60 MG: 30 CAPSULE, LIQUID FILLED ORAL at 21:35

## 2022-08-04 RX ADMIN — SODIUM CHLORIDE 150 ML/HR: 3 INJECTION, SOLUTION INTRAVENOUS at 16:06

## 2022-08-04 RX ADMIN — HYDROMORPHONE HYDROCHLORIDE 1 MG: 1 INJECTION, SOLUTION INTRAMUSCULAR; INTRAVENOUS; SUBCUTANEOUS at 18:08

## 2022-08-04 RX ADMIN — INSULIN DETEMIR 5 UNITS: 100 INJECTION, SOLUTION SUBCUTANEOUS at 08:03

## 2022-08-04 RX ADMIN — LEVETIRACETAM 500 MG: 500 TABLET, FILM COATED ORAL at 21:35

## 2022-08-04 NOTE — PAYOR COMM NOTE
"Ref # [id number]  Jackie Burrows RN, BSN, CMGT-BC  Phone # [phone]  Fax # [phone]  Mary Menezes (46 y.o. Female)             Date of Birth   1975    Social Security Number       Address   [address] [place] [address]    Home Phone   [phone]    MRN   [id number]       Nondenominational   None    Marital Status                               Admission Date   7/25/22    Admission Type   Urgent    Admitting Provider   Magdiel Walker MD    Attending Provider   Magdiel Walker MD    Department, Room/Bed   [facility] 2B ICU, N236/1       Discharge Date       Discharge Disposition       Discharge Destination                               Attending Provider: Magdiel Walker MD    Allergies: No Known Allergies    Isolation: None   Infection: None   Code Status: CPR   Advance Care Planning Activity    Ht: 170 cm (66.93\")   Wt: 99.2 kg (218 lb 11.1 oz)    Admission Cmt: None   Principal Problem: SAH  [I60.9]                 Active Insurance as of 7/25/2022     Primary Coverage     Payor Plan Insurance Group Employer/Plan Group    [id number] PPO [id number]     Payor Plan Address Payor Plan Phone Number Payor Plan Fax Number Effective Dates    [phone]  1/1/2015 - None Entered    [place] [id number]       Subscriber Name Subscriber Birth Date Member ID       LAYA MENEZES 1975 [id number]                   Current Facility-Administered Medications   Medication Dose Route Frequency Provider Last Rate Last Admin   • acetaminophen (TYLENOL) tablet 325 mg  325 mg Oral Daily Duran Willis MD   325 mg at 08/04/22 0810   • acetaminophen (TYLENOL) tablet 650 mg  650 mg Oral Q4H PRN Duran Willis MD   650 mg at 08/01/22 0356   • aspirin chewable tablet 81 mg  81 mg Oral Daily Duran Willis MD   81 mg at 08/04/22 0810   • polyethylene glycol (MIRALAX) packet 17 g  17 g Oral Daily Case, " Mary V., DO   17 g at 08/04/22 0810    And   • sennosides-docusate (PERICOLACE) 8.6-50 MG per tablet 2 tablet  2 tablet Oral BID Case, Mary V., DO   2 tablet at 08/04/22 0810    And   • bisacodyl (DULCOLAX) EC tablet 5 mg  5 mg Oral Daily PRN Case, Mary V., DO        And   • bisacodyl (DULCOLAX) suppository 10 mg  10 mg Rectal Daily PRN Case, Mary V., DO   10 mg at 08/03/22 1607   • busPIRone (BUSPAR) tablet 10 mg  10 mg Oral TID PRN Case, Mary V., DO   10 mg at 08/04/22 0657   • dexamethasone (DECADRON) injection 4 mg  4 mg Intravenous Q6H Laisha Siddiqui PA-C   4 mg at 08/04/22 0959   • dextrose (D50W) (25 g/50 mL) IV injection 25 g  25 g Intravenous Q15 Min PRN Laisha Siddiqui PA-C       • dextrose (GLUTOSE) oral gel 15 g  15 g Oral Q15 Min PRN Laisha Siddiqui PA-C       • enalaprilat (VASOTEC) injection 1.25 mg  1.25 mg Intravenous Q6H PRN Duran Willis MD   1.25 mg at 07/29/22 2112   • glucagon (human recombinant) (GLUCAGEN DIAGNOSTIC) injection 1 mg  1 mg Intramuscular Q15 Min PRN Laisha Siddiqui PA-C       • hydrALAZINE (APRESOLINE) injection 10 mg  10 mg Intravenous Q4H PRN Duran Willis MD   10 mg at 07/30/22 0040   • HYDROmorphone (DILAUDID) injection 1 mg  1 mg Intravenous Q4H PRN Duran Willis MD   1 mg at 08/04/22 1358   • insulin detemir (LEVEMIR) injection 5 Units  5 Units Subcutaneous Q12H Case, Mary V., DO       • Insulin Lispro (humaLOG) injection 0-7 Units  0-7 Units Subcutaneous TID AC Laisha Siddiqui PA-C   2 Units at 08/04/22 0814   • labetalol (NORMODYNE,TRANDATE) injection 10 mg  10 mg Intravenous Q10 Min PRN Duran Willis MD       • levETIRAcetam (KEPPRA) tablet 500 mg  500 mg Oral BID Duran Willis MD   500 mg at 08/04/22 0810   • lisinopril (PRINIVIL,ZESTRIL) tablet 5 mg  5 mg Oral Q24H Duran Willis MD   5 mg at 08/04/22 0811   • Magnesium Sulfate 2 gram Bolus, followed by 8 gram infusion (total  Mg dose 10 grams)- Mg less than or equal to 1mg/dL  2 g Intravenous PRN Laisha Siddiqui PA-C        Or   • Magnesium Sulfate 2 gram / 50mL Infusion (GIVE X 3 BAGS TO EQUAL 6GM TOTAL DOSE) - Mg 1.1 - 1.5 mg/dl  2 g Intravenous PRN Laisha Siddiqui PA-C        Or   • Magnesium Sulfate 4 gram infusion- Mg 1.6-1.9 mg/dL  4 g Intravenous PRN Laisha Siddiqui PA-C 25 mL/hr at 08/04/22 0653 4 g at 08/04/22 0653   • niCARdipine (CARDENE) 20 mg in 200 mL NS infusion  5-15 mg/hr Intravenous Titrated Duran Willis MD   Held at 07/31/22 1439   • nicotine (NICODERM CQ) 21 MG/24HR patch 1 patch  1 patch Transdermal Q24H Laisha Siddiqui PA-C   1 patch at 08/04/22 0814   • niMODipine (NIMOTOP) capsule 60 mg  60 mg Oral Q4H Duran Willis MD   60 mg at 08/04/22 1358   • ondansetron (ZOFRAN) injection 4 mg  4 mg Intravenous Q6H PRN Duran Willis MD   4 mg at 08/01/22 1223   • oxyCODONE-acetaminophen (PERCOCET) 7.5-325 MG per tablet 1 tablet  1 tablet Oral Q4H PRN Duran Willis MD   1 tablet at 08/04/22 1212   • pantoprazole (PROTONIX) EC tablet 40 mg  40 mg Oral Q AM Case, Mary V., DO   40 mg at 08/04/22 0530   • simethicone (MYLICON) chewable tablet 80 mg  80 mg Oral 4x Daily PRN Case, Mary V., DO       • sodium chloride (HYPERTONIC) 3 % infusion  150 mL/hr Intravenous Continuous Laisha Siddiqui PA-C 150 mL/hr at 08/04/22 1117 150 mL/hr at 08/04/22 1117   • sodium chloride tablet 2 g  2 g Oral TID With Meals Laisha Siddiqui PA-C   2 g at 08/04/22 1213   • ticagrelor (BRILINTA) tablet 90 mg  90 mg Oral BID Duran Willis MD   90 mg at 08/04/22 0811     Lab Results (last 48 hours)     Procedure Component Value Units Date/Time    Sodium [[id number]]  (Normal) Collected: 08/04/22 1218    Specimen: Blood Updated: 08/04/22 1233     Sodium 143 mmol/L     POC Glucose Once [[id number]]  (Normal) Collected: 08/04/22 1159    Specimen: Blood Updated: 08/04/22 1201     Glucose 75  mg/dL      Comment: Meter: [id number] : [id number] Yair Epps       POC Glucose Once [[id number]]  (Abnormal) Collected: 08/04/22 0801    Specimen: Blood Updated: 08/04/22 0805     Glucose 203 mg/dL      Comment: Meter: [id number] : [id number] Yair Epps       Sodium [[id number]]  (Normal) Collected: 08/04/22 0530    Specimen: Blood Updated: 08/04/22 0608     Sodium 140 mmol/L     Sodium [[id number]]  (Normal) Collected: 08/03/22 2358    Specimen: Blood Updated: 08/04/22 0032     Sodium 140 mmol/L     Basic Metabolic Panel [[id number]]  (Abnormal) Collected: 08/03/22 2358    Specimen: Blood Updated: 08/04/22 0032     Glucose 233 mg/dL      BUN 14 mg/dL      Creatinine 0.56 mg/dL      Sodium 140 mmol/L      Potassium 4.2 mmol/L      Chloride 107 mmol/L      CO2 23.0 mmol/L      Calcium 8.1 mg/dL      BUN/Creatinine Ratio 25.0     Anion Gap 10.0 mmol/L      eGFR 114.1 mL/min/1.73      Comment: National Kidney Foundation and American Society of Nephrology (ASN) Task Force recommended calculation based on the Chronic Kidney Disease Epidemiology Collaboration (CKD-EPI) equation refit without adjustment for race.       Narrative:      GFR Normal >60  Chronic Kidney Disease <60  Kidney Failure <15      Magnesium [[id number]]  (Normal) Collected: 08/03/22 2358    Specimen: Blood Updated: 08/04/22 0031     Magnesium 1.8 mg/dL     POC Glucose Once [[id number]]  (Abnormal) Collected: 08/03/22 2023    Specimen: Blood Updated: 08/03/22 2025     Glucose 218 mg/dL      Comment: Meter: [id number] : [id number] Homa Kovaleva       Sodium [[id number]]  (Normal) Collected: 08/03/22 1807    Specimen: Blood Updated: 08/03/22 1846     Sodium 136 mmol/L     POC Glucose Once [[id number]]  (Normal) Collected: 08/03/22 1703    Specimen: Blood Updated: 08/03/22 1704     Glucose 120 mg/dL      Comment: Meter: [id number] : [id number] Adolph Prietoelis       Sodium [[id number]]  (Normal) Collected: 08/03/22 1210    Specimen: Blood  Updated: 08/03/22 1245     Sodium 138 mmol/L     POC Glucose Once [[id number]]  (Normal) Collected: 08/03/22 1210    Specimen: Blood Updated: 08/03/22 1211     Glucose 126 mg/dL      Comment: Meter: [id number] : [id number] Adolph [employer]       POC Glucose Once [[id number]]  (Normal) Collected: 08/03/22 0739    Specimen: Blood Updated: 08/03/22 0740     Glucose 121 mg/dL      Comment: Meter: [id number] : [id number] Adolph Avelis       Basic Metabolic Panel [[id number]]  (Abnormal) Collected: 08/03/22 0505    Specimen: Blood Updated: 08/03/22 0544     Glucose 118 mg/dL      BUN 13 mg/dL      Creatinine 0.48 mg/dL      Sodium 140 mmol/L      Potassium 4.2 mmol/L      Comment: Slight hemolysis detected by analyzer. Results may be affected.        Chloride 106 mmol/L      CO2 25.0 mmol/L      Calcium 7.7 mg/dL      BUN/Creatinine Ratio 27.1     Anion Gap 9.0 mmol/L      eGFR 118.5 mL/min/1.73      Comment: National Kidney Foundation and American Society of Nephrology (ASN) Task Force recommended calculation based on the Chronic Kidney Disease Epidemiology Collaboration (CKD-EPI) equation refit without adjustment for race.       Narrative:      GFR Normal >60  Chronic Kidney Disease <60  Kidney Failure <15      Sodium [[id number]]  (Normal) Collected: 08/03/22 0505    Specimen: Blood Updated: 08/03/22 0544     Sodium 140 mmol/L     Magnesium [[id number]]  (Normal) Collected: 08/03/22 0505    Specimen: Blood Updated: 08/03/22 0544     Magnesium 1.7 mg/dL     Sodium [[id number]]  (Normal) Collected: 08/02/22 2324    Specimen: Blood Updated: 08/02/22 2350     Sodium 141 mmol/L     POC Glucose Once [[id number]]  (Abnormal) Collected: 08/02/22 2035    Specimen: Blood Updated: 08/02/22 2037     Glucose 210 mg/dL      Comment: Meter: [id number] : [id number] Ever Shari       Sodium [[id number]]  (Normal) Collected: 08/02/22 1832    Specimen: Blood Updated: 08/02/22 1851     Sodium 142 mmol/L     POC Glucose Once  [[id number]]  (Abnormal) Collected: 08/02/22 1627    Specimen: Blood Updated: 08/02/22 1631     Glucose 175 mg/dL      Comment: Meter: [id number] : [id number] Rikki Dawkins             Imaging Results (Last 48 Hours)     ** No results found for the last 48 hours. **        Operative/Procedure Notes (last 48 hours)  Notes from 08/02/22 1531 through 08/04/22 1531   No notes of this type exist for this encounter.          Physician Progress Notes (last 48 hours)      Case, Mary CARDOSO, DO at 08/04/22 1116          INTENSIVIST   PROGRESS NOTE     Hospital:  LOS: 10 days     Ms. Mary Celis, 46 y.o. female is followed for a Chief Complaint of: SAH, Headache      Subjective   S     Interval History:  No acute events. Continues to have a headache.        The patient's relevant past medical, surgical and social history were reviewed and updated in Epic as appropriate.      ROS:   Constitutional: Negative for fever.   Respiratory: Negative for dyspnea.   Cardiovascular: Negative for chest pain.   Gastrointestinal: Negative for  nausea, vomiting and diarrhea.     Objective   O     Vitals:  Temp  Min: 97.6 °F (36.4 °C)  Max: 98.6 °F (37 °C)  BP  Min: 139/55  Max: 213/91  Pulse  Min: 53  Max: 73  Resp  Min: 17  Max: 20  SpO2  Min: 95 %  Max: 99 % No data recorded    Intake/Ouptut 24 hrs (7:00AM - 6:59 AM)  Intake & Output (last 3 days)       08/01 0701 08/02 0700 08/02 0701 08/03 0700 08/03 0701 08/04 0700 08/04 0701 08/05 0700    P.O. 1300 2195 2357 740    I.V. (mL/kg) 2593.5 (26.3) 3678.4 (36.1) 3646 (36.8) 789.7 (8)    IV Piggyback        Total Intake(mL/kg) 3893.5 (39.4) 5873.4 (57.6) 6003 (60.5) 1529.7 (15.4)    Urine (mL/kg/hr) 3580 (1.5) 4725 (1.9) 6500 (2.7) 1175 (2.8)    Stool   0     Total Output 3580 4725 6500 1175    Net +313.5 +1148.4 -497 +354.7            Stool Unmeasured Occurrence   3 x           Medications (drips):  niCARdipine, Last Rate: Stopped (07/31/22 1439)  sodium chloride, Last Rate: 150 mL/hr  (08/04/22 0805)          Physical Examination  Telemetry:  Normal sinus rhythm.    Constitutional:  No acute distress.  Resting in bed in the dark. Complaining of headache.    Eyes: No scleral icterus.   PERRL, EOM intact.    Neck:  Supple, FROM   Cardiovascular: Normal rate, regular and rhythm. Normal heart sounds.  No murmurs, gallop or rub.   Respiratory: No respiratory distress. Normal respiratory effort.  Normal breath sounds  Clear to auscultation   Abdominal:  Soft. No masses. Nontender. No distension. No HSM.   Extremities: No digital cyanosis. No clubbing.  No peripheral edema.   Skin: No rashes, lesions or ulcers   Neurological:   Alert and Oriented to person, place, and time.              Results from last 7 days   Lab Units 08/01/22  0501   WBC 10*3/mm3 22.15*   HEMOGLOBIN g/dL 10.4*   MCV fL 82.2   PLATELETS 10*3/mm3 437     Results from last 7 days   Lab Units 08/04/22  0530 08/03/22  2358 08/03/22  1807 08/03/22  1210 08/03/22  0505 08/02/22  1208 08/02/22  0540 08/01/22  1304 08/01/22  0501   SODIUM mmol/L 140 140  140 136   < > 140  140   < > 137   < > 138   POTASSIUM mmol/L  --  4.2  --   --  4.2  --  4.2  --  4.6   CO2 mmol/L  --  23.0  --   --  25.0  --  24.0  --  26.0   CREATININE mg/dL  --  0.56*  --   --  0.48*  --  0.57  --  0.49*   GLUCOSE mg/dL  --  233*  --   --  118*  --  218*  --  146*   MAGNESIUM mg/dL  --  1.8  --   --  1.7  --  1.8  --  2.1   PHOSPHORUS mg/dL  --   --   --   --   --   --   --   --  3.2    < > = values in this interval not displayed.     Estimated Creatinine Clearance: 151.6 mL/min (A) (by C-G formula based on SCr of 0.56 mg/dL (L)).              Images:  Imaging Results (Last 24 Hours)     ** No results found for the last 24 hours. **            Results: Reviewed.  I reviewed the patient's new laboratory and imaging results.  I independently reviewed the patient's new images.    Medications: Reviewed.    Assessment & Plan   A / P     Ms. Celis is a 47yo F who  presented to [facility] for headache. Imaging revealed a large subarachnoid hemorrhage with intraventricular involvement but no evidence of aneurysm and was transferred to [facility] on 7/25/22 for neurosurgical evaluation. Cerebral angiogram on 7/26 was negative for AVM or aneurysm. UDS was positive for methamphetamines and opiates. She was started on Nimotop, Keppra and daily TCD. 3% Saline was started.     She was taken back to the cath lab by Dr. 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     TCDs show improving velocities.      LUE duplex from 8/2/22 shows a superficial thrombophlebitis.     Nutrition:   Diet Regular  Advance Directives:   Code Status and Medical Interventions:   Ordered at: 07/26/22 5538     Level Of Support Discussed With:    Patient     Code Status (Patient has no pulse and is not breathing):    CPR (Attempt to Resuscitate)     Medical Interventions (Patient has pulse or is breathing):    Full Support       Active Hospital Problems    Diagnosis    • **SAH     • Smoker    • Class 1 obesity in adult    • Illicit drug use (UDS + meth/amphetamines, opiates, and oxycodone)     • Essential hypertension    • Iron deficiency anemia due to chronic blood loss        Assessment / Plan:    1. 3% hypertonic saline. Goal -155.   2. SBP goal .   3. Daily TCDs  4. Nimotop, Decadron and Keppra  5. ASA and Brilinta  6. Increase insulin.   7. AM labs  8. To remain in ICU.     High level of risk due to:  severe exacerbation of chronic illness and illness with threat to life or bodily function.    Plan of care and goals reviewed during interdisciplinary rounds.  I discussed the patient's findings and my recommendations with patient and nursing staff        Mary Batista DO    Intensive Care Medicine and Pulmonary Medicine       Electronically signed by Mary Batista DO at 08/04/22 1122     Brannon Barrientos MD at 08/04/22 0802          NEUROSURGERY PROGRESS  "NOTE    Chief Complaint: Ruptured basilar artery aneurysm    Subjective: Stable overnight.  3% was increased to 150 mL/h due to decreased sodiums.    Objective    Vital Signs: Blood pressure (!) 194/98, pulse 62, temperature 97.6 °F (36.4 °C), temperature source Axillary, resp. rate 17, height 170 cm (66.93\"), weight 99.2 kg (218 lb 11.1 oz), last menstrual period 05/08/2018, SpO2 98 %, not currently breastfeeding.    Physical Exam  Awake, alert and oriented x 3  Opens eyes spont  Pupils 3 mm rx bilat  Extraocular muscles intact bilaterally  Face symmetric bilaterally  Tongue midline  5/5 in all 4 ext  No pronator drift    Intake/Output:     Intake/Output Summary (Last 24 hours) at 8/4/2022 0802  Last data filed at 8/4/2022 0545  Gross per 24 hour   Intake 5327 ml   Output 6135 ml   Net -808 ml       Current Medications:   Current Facility-Administered Medications:   •  acetaminophen (TYLENOL) tablet 325 mg, 325 mg, Oral, Daily, Duran Willis MD, 325 mg at 08/03/22 0856  •  acetaminophen (TYLENOL) tablet 650 mg, 650 mg, Oral, Q4H PRN, Duran Willis MD, 650 mg at 08/01/22 0356  •  aspirin chewable tablet 81 mg, 81 mg, Oral, Daily, Duran Willis MD, 81 mg at 08/03/22 0856  •  sennosides-docusate (PERICOLACE) 8.6-50 MG per tablet 2 tablet, 2 tablet, Oral, BID, 2 tablet at 08/03/22 2011 **AND** polyethylene glycol (MIRALAX) packet 17 g, 17 g, Oral, Daily, 17 g at 08/02/22 1215 **AND** bisacodyl (DULCOLAX) EC tablet 5 mg, 5 mg, Oral, Daily PRN **AND** bisacodyl (DULCOLAX) suppository 10 mg, 10 mg, Rectal, Daily PRN, Case, Mary V., DO, 10 mg at 08/03/22 1607  •  busPIRone (BUSPAR) tablet 10 mg, 10 mg, Oral, TID PRN, Case, Mary V., DO, 10 mg at 08/04/22 0657  •  dexamethasone (DECADRON) injection 4 mg, 4 mg, Intravenous, Q6H, Laisha Siddiqui PA-C, 4 mg at 08/04/22 0300  •  dextrose (D50W) (25 g/50 mL) IV injection 25 g, 25 g, Intravenous, Q15 Min PRN, Laisha Siddiqui PA-C  •  " dextrose (GLUTOSE) oral gel 15 g, 15 g, Oral, Q15 Min PRN, Laisha Siddiqui PA-C  •  enalaprilat (VASOTEC) injection 1.25 mg, 1.25 mg, Intravenous, Q6H PRN, Duran Willis MD, 1.25 mg at 07/29/22 2112  •  glucagon (human recombinant) (GLUCAGEN DIAGNOSTIC) injection 1 mg, 1 mg, Intramuscular, Q15 Min PRN, Laisha Siddiqui PA-C  •  hydrALAZINE (APRESOLINE) injection 10 mg, 10 mg, Intravenous, Q4H PRN, Duran Willis MD, 10 mg at 07/30/22 0040  •  HYDROmorphone (DILAUDID) injection 1 mg, 1 mg, Intravenous, Q4H PRN, Duran Willis MD, 1 mg at 08/04/22 0530  •  insulin detemir (LEVEMIR) injection 5 Units, 5 Units, Subcutaneous, Daily, Case, Mary V., DO, 5 Units at 08/03/22 0857  •  Insulin Lispro (humaLOG) injection 0-7 Units, 0-7 Units, Subcutaneous, TID AC, Laisha Siddiqui PA-C, 2 Units at 08/02/22 1632  •  labetalol (NORMODYNE,TRANDATE) injection 10 mg, 10 mg, Intravenous, Q10 Min PRN, Duran Willis MD  •  levETIRAcetam (KEPPRA) tablet 500 mg, 500 mg, Oral, BID, Duran Willis MD, 500 mg at 08/03/22 2011  •  lisinopril (PRINIVIL,ZESTRIL) tablet 5 mg, 5 mg, Oral, Q24H, Duran Willis MD, 5 mg at 08/03/22 0856  •  Magnesium Sulfate 2 gram Bolus, followed by 8 gram infusion (total Mg dose 10 grams)- Mg less than or equal to 1mg/dL, 2 g, Intravenous, PRN **OR** Magnesium Sulfate 2 gram / 50mL Infusion (GIVE X 3 BAGS TO EQUAL 6GM TOTAL DOSE) - Mg 1.1 - 1.5 mg/dl, 2 g, Intravenous, PRN **OR** Magnesium Sulfate 4 gram infusion- Mg 1.6-1.9 mg/dL, 4 g, Intravenous, PRN, Laisha Siddiqui PA-C, Last Rate: 25 mL/hr at 08/04/22 0653, 4 g at 08/04/22 0653  •  niCARdipine (CARDENE) 20 mg in 200 mL NS infusion, 5-15 mg/hr, Intravenous, Titrated, Duran Willis MD, Held at 07/31/22 1439  •  nicotine (NICODERM CQ) 21 MG/24HR patch 1 patch, 1 patch, Transdermal, Q24H, Laisha Siddiqui PA-C, 1 patch at 08/03/22 0857  •  niMODipine (NIMOTOP) capsule 60 mg,  60 mg, Oral, Q4H, Duran Willis MD, 60 mg at 08/04/22 0530  •  ondansetron (ZOFRAN) injection 4 mg, 4 mg, Intravenous, Q6H PRN, Duran Willis MD, 4 mg at 08/01/22 1223  •  oxyCODONE-acetaminophen (PERCOCET) 7.5-325 MG per tablet 1 tablet, 1 tablet, Oral, Q4H PRN, Duran Willis MD, 1 tablet at 08/04/22 0258  •  pantoprazole (PROTONIX) EC tablet 40 mg, 40 mg, Oral, Q AM, Case, Mary V., DO, 40 mg at 08/04/22 0530  •  simethicone (MYLICON) chewable tablet 80 mg, 80 mg, Oral, 4x Daily PRN, Case, Mary V., DO  •  sodium chloride (HYPERTONIC) 3 % infusion, 150 mL/hr, Intravenous, Continuous, Laisha Siddiqui PA-C, Last Rate: 150 mL/hr at 08/04/22 0347, 150 mL/hr at 08/04/22 0347  •  sodium chloride tablet 2 g, 2 g, Oral, TID With Meals, Laisha Siddiqui PA-C, 2 g at 08/03/22 1802  •  ticagrelor (BRILINTA) tablet 90 mg, 90 mg, Oral, BID, Duran Willsi MD, 90 mg at 08/03/22 2011     Laboratory Results:       Lab 08/01/22  0501   WBC 22.15*   HEMOGLOBIN 10.4*   HEMATOCRIT 33.6*   PLATELETS 437   NEUTROS ABS 19.85*   IMMATURE GRANS (ABS) 0.28*   LYMPHS ABS 0.70   MONOS ABS 1.29*   EOS ABS 0.00   MCV 82.2         Lab 08/04/22  0530 08/03/22  2358 08/03/22  1807 08/03/22  1210 08/03/22  0505 08/02/22  1208 08/02/22  0540 08/01/22  1304 08/01/22  0501 07/31/22  1830 07/31/22  1415 07/31/22  0505   SODIUM 140 140  140 136 138 140  140   < > 137   < > 138   < > 137 138   POTASSIUM  --  4.2  --   --  4.2  --  4.2  --  4.6  --   --  4.3   CHLORIDE  --  107  --   --  106  --  104  --  106  --   --  103   CO2  --  23.0  --   --  25.0  --  24.0  --  26.0  --   --  28.0   ANION GAP  --  10.0  --   --  9.0  --  9.0  --  6.0  --   --  7.0   BUN  --  14  --   --  13  --  13  --  15  --   --  16   CREATININE  --  0.56*  --   --  0.48*  --  0.57  --  0.49*  --   --  0.48*   EGFR  --  114.1  --   --  118.5  --  113.7  --  117.9  --   --  118.5   GLUCOSE  --  233*  --   --  118*  --  218*   --  146*  --   --  133*   CALCIUM  --  8.1*  --   --  7.7*  --  7.9*  --  8.1*  --   --  8.1*   MAGNESIUM  --  1.8  --   --  1.7  --  1.8  --  2.1  --  1.9 2.7*   PHOSPHORUS  --   --   --   --   --   --   --   --  3.2  --   --   --     < > = values in this interval not displayed.                         Brief Urine Lab Results     None        Microbiology Results (last 10 days)     Procedure Component Value - Date/Time    COVID PRE-OP / PRE-PROCEDURE SCREENING ORDER (NO ISOLATION) - Swab, Nasopharynx [[id number]]  (Normal) Collected: 07/25/22 1559    Lab Status: Final result Specimen: Swab from Nasopharynx Updated: 07/25/22 1708    Narrative:      The following orders were created for panel order COVID PRE-OP / PRE-PROCEDURE SCREENING ORDER (NO ISOLATION) - Swab, Nasopharynx.  Procedure                               Abnormality         Status                     ---------                               -----------         ------                     Respiratory Panel PCR w/...[[id number]]  Normal              Final result                 Please view results for these tests on the individual orders.    Respiratory Panel PCR w/COVID-19(SARS-CoV-2) MAURICE/ROBERTO/HERBERT/PAD/COR/MAD/JAYLYN In-House, NP Swab in UTM/[facility], 3-4 HR TAT - Swab, Nasopharynx [[id number]]  (Normal) Collected: 07/25/22 1559    Lab Status: Final result Specimen: Swab from Nasopharynx Updated: 07/25/22 1708     ADENOVIRUS, PCR Not Detected     Coronavirus 229E Not Detected     Coronavirus HKU1 Not Detected     Coronavirus NL63 Not Detected     Coronavirus OC43 Not Detected     COVID19 Not Detected     Human Metapneumovirus Not Detected     Human Rhinovirus/Enterovirus Not Detected     Influenza A PCR Not Detected     Influenza B PCR Not Detected     Parainfluenza Virus 1 Not Detected     Parainfluenza Virus 2 Not Detected     Parainfluenza Virus 3 Not Detected     Parainfluenza Virus 4 Not Detected     RSV, PCR Not Detected     Bordetella pertussis pcr Not Detected      Bordetella parapertussis PCR Not Detected     Chlamydophila pneumoniae PCR Not Detected     Mycoplasma pneumo by PCR Not Detected    Narrative:      In the setting of a positive respiratory panel with a viral infection PLUS a negative procalcitonin without other underlying concern for bacterial infection, consider observing off antibiotics or discontinuation of antibiotics and continue supportive care. If the respiratory panel is positive for atypical bacterial infection (Bordetella pertussis, Chlamydophila pneumoniae, or Mycoplasma pneumoniae), consider antibiotic de-escalation to target atypical bacterial infection.           Diagnostic Imaging: I reviewed and independently interpreted the new imaging.     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  -Neurologically, patient has remained stable.  TCD's from yesterday show slight decrease in velocities, will continue to monitor. 3% now at 150 ml/hr, normal saline stopped.  Sodium goal between 145 and 155. Will continue to monitor neurologic exam. Continue Keppra and nimodipine. Appreciate ICU assistance.            Brannon Barrientos MD  08/04/22  08:02 EDT        Electronically signed by Brannon Barrientos MD at 08/04/22 0807     Mary Batista DO at 08/03/22 1243          INTENSIVIST   PROGRESS NOTE     Hospital:  LOS: 9 days     Ms. Mary Celis, 46 y.o. female is followed for a Chief Complaint of: SAH, Headache      Subjective   S     Interval History:  No acute events. Continues to have a headache.        The patient's relevant past medical, surgical and social history were reviewed and updated in Epic as appropriate.      ROS:   Constitutional: Negative for fever.   Respiratory: Negative for dyspnea.   Cardiovascular: Negative for chest pain.   Gastrointestinal: Negative for  nausea, vomiting and diarrhea.     Objective   O      Vitals:  Temp  Min: 97.6 °F (36.4 °C)  Max: 98.9 °F (37.2 °C)  BP  Min: 162/77  Max: 189/88  Pulse  Min: 47  Max: 64  Resp  Min: 16  Max: 18  SpO2  Min: 91 %  Max: 99 % No data recorded    Intake/Ouptut 24 hrs (7:00AM - 6:59 AM)  Intake & Output (last 3 days)       07/31 0701 08/01 0700 08/01 0701 08/02 0700 08/02 0701 08/03 0700 08/03 0701 08/04 0700    P.O. 1090 1300 2195 737    I.V. (mL/kg) 1895.8 (19.7) 2593.5 (26.3) 3678.4 (36.1) 439 (4.3)    IV Piggyback 143       Total Intake(mL/kg) 3128.8 (32.6) 3893.5 (39.4) 5873.4 (57.6) 1176 (11.5)    Urine (mL/kg/hr) 3200 (1.4) 3580 (1.5) 4725 (1.9) 765 (1.3)    Total Output 3200 3580 4725 765    Net -71.2 +313.5 +1148.4 +411                  Medications (drips):  niCARdipine, Last Rate: Stopped (07/31/22 1439)  sodium chloride, Last Rate: 100 mL/hr (08/03/22 1012)  sodium chloride, Last Rate: 50 mL/hr (08/03/22 0500)          Physical Examination  Telemetry:  Normal sinus rhythm.    Constitutional:  No acute distress.  Resting in bed in the dark.    Eyes: No scleral icterus.   PERRL, EOM intact.    Neck:  Supple, FROM   Cardiovascular: Normal rate, regular and rhythm. Normal heart sounds.  No murmurs, gallop or rub.   Respiratory: No respiratory distress. Normal respiratory effort.  Normal breath sounds  Clear to auscultation   Abdominal:  Soft. No masses. Nontender. No distension. No HSM.   Extremities: No digital cyanosis. No clubbing.  No peripheral edema.   Skin: No rashes, lesions or ulcers   Neurological:   Alert and Oriented to person, place, and time.              Results from last 7 days   Lab Units 08/01/22  0501   WBC 10*3/mm3 22.15*   HEMOGLOBIN g/dL 10.4*   MCV fL 82.2   PLATELETS 10*3/mm3 437     Results from last 7 days   Lab Units 08/03/22  0505 08/02/22  2324 08/02/22  1832 08/02/22  1208 08/02/22  0540 08/01/22  1304 08/01/22  0501   SODIUM mmol/L 140  140 141 142   < > 137   < > 138   POTASSIUM mmol/L 4.2  --   --   --  4.2  --  4.6   CO2  mmol/L 25.0  --   --   --  24.0  --  26.0   CREATININE mg/dL 0.48*  --   --   --  0.57  --  0.49*   GLUCOSE mg/dL 118*  --   --   --  218*  --  146*   MAGNESIUM mg/dL 1.7  --   --   --  1.8  --  2.1   PHOSPHORUS mg/dL  --   --   --   --   --   --  3.2    < > = values in this interval not displayed.     Estimated Creatinine Clearance: 179.4 mL/min (A) (by C-G formula based on SCr of 0.48 mg/dL (L)).              Images:  Imaging Results (Last 24 Hours)     ** No results found for the last 24 hours. **            Results: Reviewed.  I reviewed the patient's new laboratory and imaging results.  I independently reviewed the patient's new images.    Medications: Reviewed.    Assessment & Plan   A / P     Ms. Celis is a 47yo F who presented to [facility] for headache. Imaging revealed a large subarachnoid hemorrhage with intraventricular involvement but no evidence of aneurysm and was transferred to [facility] on 7/25/22 for neurosurgical evaluation. Cerebral angiogram on 7/26 was negative for AVM or aneurysm. UDS was positive for methamphetamines and opiates. She was started on Nimotop, Keppra and daily TCD. 3% Saline was started.     She was taken back to the cath lab by Dr. 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     TCDs show increased velocities.     LUE duplex from 8/2/22 shows a superficial thrombophlebitis.     Nutrition:   Diet Regular  Advance Directives:   Code Status and Medical Interventions:   Ordered at: 07/26/22 1877     Level Of Support Discussed With:    Patient     Code Status (Patient has no pulse and is not breathing):    CPR (Attempt to Resuscitate)     Medical Interventions (Patient has pulse or is breathing):    Full Support       Active Hospital Problems    Diagnosis    • **SAH     • Smoker    • Class 1 obesity in adult    • Illicit drug use (UDS + meth/amphetamines, opiates, and oxycodone)     • Essential hypertension    • Iron deficiency anemia due to chronic  "blood loss        Assessment / Plan:    9. 3% hypertonic saline. Goal -155.   10. SBP goal .   11. Daily TCDs  12. Nimotop, Decadron and Keppra  13. ASA and Brilinta  14. Continue current insulin regimen.   15. LUE duplex with superficial thrombophlebitis. This does not require anticoagulation.    16. Replace magnesium.   17. AM labs  18. To remain in ICU.     High level of risk due to:  severe exacerbation of chronic illness and illness with threat to life or bodily function.    Plan of care and goals reviewed during interdisciplinary rounds.  I discussed the patient's findings and my recommendations with patient and nursing staff        Mary Batista DO    Intensive Care Medicine and Pulmonary Medicine       Electronically signed by Mary Batista DO at 08/03/22 1245     Brannon Barrientos MD at 08/03/22 1218          NEUROSURGERY PROGRESS NOTE    Chief Complaint: Ruptured basilar artery aneurysm    Subjective: Stable overnight.    Objective    Vital Signs: Blood pressure 169/85, pulse 56, temperature 97.6 °F (36.4 °C), temperature source Oral, resp. rate 16, height 170 cm (66.93\"), weight 102 kg (225 lb 5 oz), last menstrual period 05/08/2018, SpO2 98 %, not currently breastfeeding.    Physical Exam  Awake, alert and oriented x 3  Opens eyes spont  Pupils 3 mm rx bilat  Extraocular muscles intact bilaterally  Face symmetric bilaterally  Tongue midline  5/5 in all 4 ext  No pronator drift    Intake/Output:     Intake/Output Summary (Last 24 hours) at 8/3/2022 1218  Last data filed at 8/3/2022 1012  Gross per 24 hour   Intake 5289.3 ml   Output 4615 ml   Net 674.3 ml       Current Medications:   Current Facility-Administered Medications:   •  acetaminophen (TYLENOL) tablet 325 mg, 325 mg, Oral, Daily, Duran Willis MD, 325 mg at 08/03/22 0856  •  acetaminophen (TYLENOL) tablet 650 mg, 650 mg, Oral, Q4H PRN, Duran Willis MD, 650 mg at 08/01/22 0356  •  aspirin chewable tablet " 81 mg, 81 mg, Oral, Daily, Duran Willis MD, 81 mg at 08/03/22 0856  •  sennosides-docusate (PERICOLACE) 8.6-50 MG per tablet 2 tablet, 2 tablet, Oral, BID, 2 tablet at 08/03/22 0857 **AND** polyethylene glycol (MIRALAX) packet 17 g, 17 g, Oral, Daily, 17 g at 08/02/22 1215 **AND** bisacodyl (DULCOLAX) EC tablet 5 mg, 5 mg, Oral, Daily PRN **AND** bisacodyl (DULCOLAX) suppository 10 mg, 10 mg, Rectal, Daily PRN, Case, Mary V., DO  •  busPIRone (BUSPAR) tablet 10 mg, 10 mg, Oral, TID PRN, Case, Mary V., DO, 10 mg at 08/03/22 0817  •  dexamethasone (DECADRON) injection 4 mg, 4 mg, Intravenous, Q6H, Laisha Siddiuqi PA-C, 4 mg at 08/03/22 1012  •  dextrose (D50W) (25 g/50 mL) IV injection 25 g, 25 g, Intravenous, Q15 Min PRN, Laisha Siddiqui PA-C  •  dextrose (GLUTOSE) oral gel 15 g, 15 g, Oral, Q15 Min PRN, Laisha Siddiqui PA-C  •  enalaprilat (VASOTEC) injection 1.25 mg, 1.25 mg, Intravenous, Q6H PRN, Duran Willis MD, 1.25 mg at 07/29/22 2112  •  glucagon (human recombinant) (GLUCAGEN DIAGNOSTIC) injection 1 mg, 1 mg, Intramuscular, Q15 Min PRN, Laisha Siddiqui PA-C  •  hydrALAZINE (APRESOLINE) injection 10 mg, 10 mg, Intravenous, Q4H PRN, Duran Willis MD, 10 mg at 07/30/22 0040  •  HYDROmorphone (DILAUDID) injection 1 mg, 1 mg, Intravenous, Q4H PRN, Duran Willis MD, 1 mg at 08/03/22 0817  •  insulin detemir (LEVEMIR) injection 5 Units, 5 Units, Subcutaneous, Daily, Case, Mary V., DO, 5 Units at 08/03/22 0857  •  Insulin Lispro (humaLOG) injection 0-7 Units, 0-7 Units, Subcutaneous, TID TESFAYE, Laisha Siddiqui, JORGE, 2 Units at 08/02/22 1632  •  labetalol (NORMODYNE,TRANDATE) injection 10 mg, 10 mg, Intravenous, Q10 Min PRN, Duran Willis MD  •  levETIRAcetam (KEPPRA) tablet 500 mg, 500 mg, Oral, BID, Duran Willis MD, 500 mg at 08/03/22 0856  •  lisinopril (PRINIVIL,ZESTRIL) tablet 5 mg, 5 mg, Oral, Q24H, Duran Willis MD,  5 mg at 08/03/22 0856  •  Magnesium Sulfate 2 gram Bolus, followed by 8 gram infusion (total Mg dose 10 grams)- Mg less than or equal to 1mg/dL, 2 g, Intravenous, PRN **OR** Magnesium Sulfate 2 gram / 50mL Infusion (GIVE X 3 BAGS TO EQUAL 6GM TOTAL DOSE) - Mg 1.1 - 1.5 mg/dl, 2 g, Intravenous, PRN **OR** Magnesium Sulfate 4 gram infusion- Mg 1.6-1.9 mg/dL, 4 g, Intravenous, PRN, Laisha Siddiqui PA-C, Last Rate: 25 mL/hr at 08/03/22 0615, 4 g at 08/03/22 0615  •  niCARdipine (CARDENE) 20 mg in 200 mL NS infusion, 5-15 mg/hr, Intravenous, Titrated, Duran Willis MD, Held at 07/31/22 1439  •  nicotine (NICODERM CQ) 21 MG/24HR patch 1 patch, 1 patch, Transdermal, Q24H, Laisha Siddiqui PA-C, 1 patch at 08/03/22 0857  •  niMODipine (NIMOTOP) capsule 60 mg, 60 mg, Oral, Q4H, Duran Willis MD, 60 mg at 08/03/22 1012  •  ondansetron (ZOFRAN) injection 4 mg, 4 mg, Intravenous, Q6H PRN, Duran Willis MD, 4 mg at 08/01/22 1223  •  oxyCODONE-acetaminophen (PERCOCET) 7.5-325 MG per tablet 1 tablet, 1 tablet, Oral, Q4H PRN, Duran Willis MD, 1 tablet at 08/03/22 1012  •  pantoprazole (PROTONIX) EC tablet 40 mg, 40 mg, Oral, Q AM, Case, Mary V., DO, 40 mg at 08/03/22 0616  •  simethicone (MYLICON) chewable tablet 80 mg, 80 mg, Oral, 4x Daily PRN, Case, Mary V., DO  •  sodium chloride (HYPERTONIC) 3 % infusion, 100 mL/hr, Intravenous, Continuous, Brannon Barrientos MD, Last Rate: 100 mL/hr at 08/03/22 1012, 100 mL/hr at 08/03/22 1012  •  sodium chloride 0.9 % infusion, 50 mL/hr, Intravenous, Continuous, Brannon Barrientos MD, Last Rate: 50 mL/hr at 08/03/22 0500, 50 mL/hr at 08/03/22 0500  •  sodium chloride tablet 2 g, 2 g, Oral, TID With Meals, Laisha Siddiqui PA-C, 2 g at 08/03/22 1204  •  ticagrelor (BRILINTA) tablet 90 mg, 90 mg, Oral, BID, WillisDuran MD, 90 mg at 08/03/22 0857     Laboratory Results:       Lab 08/01/22  0501   WBC 22.15*   HEMOGLOBIN 10.4*    HEMATOCRIT 33.6*   PLATELETS 437   NEUTROS ABS 19.85*   IMMATURE GRANS (ABS) 0.28*   LYMPHS ABS 0.70   MONOS ABS 1.29*   EOS ABS 0.00   MCV 82.2         Lab 08/03/22  0505 08/02/22  2324 08/02/22  1832 08/02/22  1208 08/02/22  0540 08/01/22  1304 08/01/22  0501 07/31/22  1830 07/31/22  1415 07/31/22  0505 07/30/22  2213   SODIUM 140  140 141 142 140 137   < > 138   < > 137 138 137   POTASSIUM 4.2  --   --   --  4.2  --  4.6  --   --  4.3 4.3   CHLORIDE 106  --   --   --  104  --  106  --   --  103 102   CO2 25.0  --   --   --  24.0  --  26.0  --   --  28.0 24.0   ANION GAP 9.0  --   --   --  9.0  --  6.0  --   --  7.0 11.0   BUN 13  --   --   --  13  --  15  --   --  16 20   CREATININE 0.48*  --   --   --  0.57  --  0.49*  --   --  0.48* 0.51*   EGFR 118.5  --   --   --  113.7  --  117.9  --   --  118.5 116.8   GLUCOSE 118*  --   --   --  218*  --  146*  --   --  133* 188*   CALCIUM 7.7*  --   --   --  7.9*  --  8.1*  --   --  8.1* 8.6   MAGNESIUM 1.7  --   --   --  1.8  --  2.1  --  1.9 2.7* 1.7   PHOSPHORUS  --   --   --   --   --   --  3.2  --   --   --   --     < > = values in this interval not displayed.                         Brief Urine Lab Results     None        Microbiology Results (last 10 days)     Procedure Component Value - Date/Time    COVID PRE-OP / PRE-PROCEDURE SCREENING ORDER (NO ISOLATION) - Swab, Nasopharynx [[id number]]  (Normal) Collected: 07/25/22 5700    Lab Status: Final result Specimen: Swab from Nasopharynx Updated: 07/25/22 8320    Narrative:      The following orders were created for panel order COVID PRE-OP / PRE-PROCEDURE SCREENING ORDER (NO ISOLATION) - Swab, Nasopharynx.  Procedure                               Abnormality         Status                     ---------                               -----------         ------                     Respiratory Panel PCR w/...[[id number]]  Normal              Final result                 Please view results for these tests on the  individual orders.    Respiratory Panel PCR w/COVID-19(SARS-CoV-2) MAURICE/ROBERTO/HERBERT/PAD/COR/MAD/JAYLYN In-House, NP Swab in UTM/VTM, 3-4 HR TAT - Swab, Nasopharynx [[id number]]  (Normal) Collected: 07/25/22 1559    Lab Status: Final result Specimen: Swab from Nasopharynx Updated: 07/25/22 1701     ADENOVIRUS, PCR Not Detected     Coronavirus 229E Not Detected     Coronavirus HKU1 Not Detected     Coronavirus NL63 Not Detected     Coronavirus OC43 Not Detected     COVID19 Not Detected     Human Metapneumovirus Not Detected     Human Rhinovirus/Enterovirus Not Detected     Influenza A PCR Not Detected     Influenza B PCR Not Detected     Parainfluenza Virus 1 Not Detected     Parainfluenza Virus 2 Not Detected     Parainfluenza Virus 3 Not Detected     Parainfluenza Virus 4 Not Detected     RSV, PCR Not Detected     Bordetella pertussis pcr Not Detected     Bordetella parapertussis PCR Not Detected     Chlamydophila pneumoniae PCR Not Detected     Mycoplasma pneumo by PCR Not Detected    Narrative:      In the setting of a positive respiratory panel with a viral infection PLUS a negative procalcitonin without other underlying concern for bacterial infection, consider observing off antibiotics or discontinuation of antibiotics and continue supportive care. If the respiratory panel is positive for atypical bacterial infection (Bordetella pertussis, Chlamydophila pneumoniae, or Mycoplasma pneumoniae), consider antibiotic de-escalation to target atypical bacterial infection.           Diagnostic Imaging: I reviewed and independently interpreted the new imaging.     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  -Neurologically, patient has remained stable.  Her TCD's continue to mildly increase. Cont her 3% at 100 mls per hour and NS at 50 ml/hr.  Sodium goal between 145 and 155. We will continue to  monitor her neurological exam closely.  Continue Keppra and nimodipine. Appreciate ICU assistance.         Brannon Barrientos MD  08/03/22  12:18 EDT        Electronically signed by Brannon Barrientos MD at 08/03/22 1224

## 2022-08-04 NOTE — PROGRESS NOTES
INTENSIVIST   PROGRESS NOTE     Hospital:  LOS: 10 days     Ms. Mary Celis, 46 y.o. female is followed for a Chief Complaint of: SAH, Headache      Subjective   S     Interval History:  No acute events. Continues to have a headache.        The patient's relevant past medical, surgical and social history were reviewed and updated in Epic as appropriate.      ROS:   Constitutional: Negative for fever.   Respiratory: Negative for dyspnea.   Cardiovascular: Negative for chest pain.   Gastrointestinal: Negative for  nausea, vomiting and diarrhea.     Objective   O     Vitals:  Temp  Min: 97.6 °F (36.4 °C)  Max: 98.6 °F (37 °C)  BP  Min: 139/55  Max: 213/91  Pulse  Min: 53  Max: 73  Resp  Min: 17  Max: 20  SpO2  Min: 95 %  Max: 99 % No data recorded    Intake/Ouptut 24 hrs (7:00AM - 6:59 AM)  Intake & Output (last 3 days)       08/01 0701  08/02 0700 08/02 0701  08/03 0700 08/03 0701  08/04 0700 08/04 0701  08/05 0700    P.O. 1300 2195 2357 740    I.V. (mL/kg) 2593.5 (26.3) 3678.4 (36.1) 3646 (36.8) 789.7 (8)    IV Piggyback        Total Intake(mL/kg) 3893.5 (39.4) 5873.4 (57.6) 6003 (60.5) 1529.7 (15.4)    Urine (mL/kg/hr) 3580 (1.5) 4725 (1.9) 6500 (2.7) 1175 (2.8)    Stool   0     Total Output 3580 4725 6500 1175    Net +313.5 +1148.4 -497 +354.7            Stool Unmeasured Occurrence   3 x           Medications (drips):  niCARdipine, Last Rate: Stopped (07/31/22 1439)  sodium chloride, Last Rate: 150 mL/hr (08/04/22 0805)          Physical Examination  Telemetry:  Normal sinus rhythm.    Constitutional:  No acute distress.  Resting in bed in the dark. Complaining of headache.    Eyes: No scleral icterus.   PERRL, EOM intact.    Neck:  Supple, FROM   Cardiovascular: Normal rate, regular and rhythm. Normal heart sounds.  No murmurs, gallop or rub.   Respiratory: No respiratory distress. Normal respiratory effort.  Normal breath sounds  Clear to auscultation   Abdominal:  Soft. No masses. Nontender. No distension. No  HSM.   Extremities: No digital cyanosis. No clubbing.  No peripheral edema.   Skin: No rashes, lesions or ulcers   Neurological:   Alert and Oriented to person, place, and time.              Results from last 7 days   Lab Units 08/01/22  0501   WBC 10*3/mm3 22.15*   HEMOGLOBIN g/dL 10.4*   MCV fL 82.2   PLATELETS 10*3/mm3 437     Results from last 7 days   Lab Units 08/04/22  0530 08/03/22  2358 08/03/22  1807 08/03/22  1210 08/03/22  0505 08/02/22  1208 08/02/22  0540 08/01/22  1304 08/01/22  0501   SODIUM mmol/L 140 140  140 136   < > 140  140   < > 137   < > 138   POTASSIUM mmol/L  --  4.2  --   --  4.2  --  4.2  --  4.6   CO2 mmol/L  --  23.0  --   --  25.0  --  24.0  --  26.0   CREATININE mg/dL  --  0.56*  --   --  0.48*  --  0.57  --  0.49*   GLUCOSE mg/dL  --  233*  --   --  118*  --  218*  --  146*   MAGNESIUM mg/dL  --  1.8  --   --  1.7  --  1.8  --  2.1   PHOSPHORUS mg/dL  --   --   --   --   --   --   --   --  3.2    < > = values in this interval not displayed.     Estimated Creatinine Clearance: 151.6 mL/min (A) (by C-G formula based on SCr of 0.56 mg/dL (L)).              Images:  Imaging Results (Last 24 Hours)     ** No results found for the last 24 hours. **            Results: Reviewed.  I reviewed the patient's new laboratory and imaging results.  I independently reviewed the patient's new images.    Medications: Reviewed.    Assessment & Plan   A / P     Ms. Celis is a 47yo F who presented to [facility] for headache. Imaging revealed a large subarachnoid hemorrhage with intraventricular involvement but no evidence of aneurysm and was transferred to [facility] on 7/25/22 for neurosurgical evaluation. Cerebral angiogram on 7/26 was negative for AVM or aneurysm. UDS was positive for methamphetamines and opiates. She was started on Nimotop, Keppra and daily TCD. 3% Saline was started.     She was taken back to the cath lab by Dr. Willis on 7/31 for embolization of a basilar apex artery  aneurysm.     TCDs show improving velocities.      LUE duplex from 8/2/22 shows a superficial thrombophlebitis.     Nutrition:   Diet Regular  Advance Directives:   Code Status and Medical Interventions:   Ordered at: 07/26/22 5218     Level Of Support Discussed With:    Patient     Code Status (Patient has no pulse and is not breathing):    CPR (Attempt to Resuscitate)     Medical Interventions (Patient has pulse or is breathing):    Full Support       Active Hospital Problems    Diagnosis    • **SAH     • Smoker    • Class 1 obesity in adult    • Illicit drug use (UDS + meth/amphetamines, opiates, and oxycodone)     • Essential hypertension    • Iron deficiency anemia due to chronic blood loss        Assessment / Plan:    1. 3% hypertonic saline. Goal -155.   2. SBP goal .   3. Daily TCDs  4. Nimotop, Decadron and Keppra  5. ASA and Brilinta  6. Increase insulin.   7. AM labs  8. To remain in ICU.     High level of risk due to:  severe exacerbation of chronic illness and illness with threat to life or bodily function.    Plan of care and goals reviewed during interdisciplinary rounds.  I discussed the patient's findings and my recommendations with patient and nursing staff        Mary Batista, DO    Intensive Care Medicine and Pulmonary Medicine

## 2022-08-04 NOTE — PLAN OF CARE
Goal Outcome Evaluation:               Pt status remains unchanged.  NIH 0. VSS and within parameters.  UOP excessive. Pt with 1 small BM but is passing gas frequently.  Afebrile. Headache remains 6-8/10 per pt despite around the clock meds.  3% increased to 150 and sodium improving.

## 2022-08-04 NOTE — NURSING NOTE
3% NS increased to 150ml/hr per neurosurgery.  IV pump will not allow, as is over the hard limit.  Pharmacy notified and advised to run it as basic infusion until they are able to resolve.

## 2022-08-04 NOTE — PROGRESS NOTES
"NEUROSURGERY PROGRESS NOTE    Chief Complaint: Ruptured basilar artery aneurysm    Subjective: Stable overnight.  3% was increased to 150 mL/h due to decreased sodiums.    Objective    Vital Signs: Blood pressure (!) 194/98, pulse 62, temperature 97.6 °F (36.4 °C), temperature source Axillary, resp. rate 17, height 170 cm (66.93\"), weight 99.2 kg (218 lb 11.1 oz), last menstrual period 05/08/2018, SpO2 98 %, not currently breastfeeding.    Physical Exam  Awake, alert and oriented x 3  Opens eyes spont  Pupils 3 mm rx bilat  Extraocular muscles intact bilaterally  Face symmetric bilaterally  Tongue midline  5/5 in all 4 ext  No pronator drift    Intake/Output:     Intake/Output Summary (Last 24 hours) at 8/4/2022 0802  Last data filed at 8/4/2022 0545  Gross per 24 hour   Intake 5327 ml   Output 6135 ml   Net -808 ml       Current Medications:   Current Facility-Administered Medications:   •  acetaminophen (TYLENOL) tablet 325 mg, 325 mg, Oral, Daily, Duran Willis MD, 325 mg at 08/03/22 0856  •  acetaminophen (TYLENOL) tablet 650 mg, 650 mg, Oral, Q4H PRN, Duran Willis MD, 650 mg at 08/01/22 0356  •  aspirin chewable tablet 81 mg, 81 mg, Oral, Daily, Duran Willis MD, 81 mg at 08/03/22 0856  •  sennosides-docusate (PERICOLACE) 8.6-50 MG per tablet 2 tablet, 2 tablet, Oral, BID, 2 tablet at 08/03/22 2011 **AND** polyethylene glycol (MIRALAX) packet 17 g, 17 g, Oral, Daily, 17 g at 08/02/22 1215 **AND** bisacodyl (DULCOLAX) EC tablet 5 mg, 5 mg, Oral, Daily PRN **AND** bisacodyl (DULCOLAX) suppository 10 mg, 10 mg, Rectal, Daily PRN, Case, Mary V., DO, 10 mg at 08/03/22 1607  •  busPIRone (BUSPAR) tablet 10 mg, 10 mg, Oral, TID PRN, Case, Mary V., DO, 10 mg at 08/04/22 0657  •  dexamethasone (DECADRON) injection 4 mg, 4 mg, Intravenous, Q6H, Laisha Siddiqui PA-C, 4 mg at 08/04/22 0300  •  dextrose (D50W) (25 g/50 mL) IV injection 25 g, 25 g, Intravenous, Q15 Min PRN, Hunt, " Laisha ROMERO PA-C  •  dextrose (GLUTOSE) oral gel 15 g, 15 g, Oral, Q15 Min PRN, Laisha Siddiqui PA-C  •  enalaprilat (VASOTEC) injection 1.25 mg, 1.25 mg, Intravenous, Q6H PRN, Duran Willis MD, 1.25 mg at 07/29/22 2112  •  glucagon (human recombinant) (GLUCAGEN DIAGNOSTIC) injection 1 mg, 1 mg, Intramuscular, Q15 Min PRN, Laisha Siddiqui PA-C  •  hydrALAZINE (APRESOLINE) injection 10 mg, 10 mg, Intravenous, Q4H PRN, Duran Willis MD, 10 mg at 07/30/22 0040  •  HYDROmorphone (DILAUDID) injection 1 mg, 1 mg, Intravenous, Q4H PRN, Duran Willis MD, 1 mg at 08/04/22 0530  •  insulin detemir (LEVEMIR) injection 5 Units, 5 Units, Subcutaneous, Daily, Case, Mary V., DO, 5 Units at 08/03/22 0857  •  Insulin Lispro (humaLOG) injection 0-7 Units, 0-7 Units, Subcutaneous, TID AC, Laisha Siddiqui PA-C, 2 Units at 08/02/22 1632  •  labetalol (NORMODYNE,TRANDATE) injection 10 mg, 10 mg, Intravenous, Q10 Min PRN, Duran Willis MD  •  levETIRAcetam (KEPPRA) tablet 500 mg, 500 mg, Oral, BID, Duran Willis MD, 500 mg at 08/03/22 2011  •  lisinopril (PRINIVIL,ZESTRIL) tablet 5 mg, 5 mg, Oral, Q24H, Duran Willis MD, 5 mg at 08/03/22 0856  •  Magnesium Sulfate 2 gram Bolus, followed by 8 gram infusion (total Mg dose 10 grams)- Mg less than or equal to 1mg/dL, 2 g, Intravenous, PRN **OR** Magnesium Sulfate 2 gram / 50mL Infusion (GIVE X 3 BAGS TO EQUAL 6GM TOTAL DOSE) - Mg 1.1 - 1.5 mg/dl, 2 g, Intravenous, PRN **OR** Magnesium Sulfate 4 gram infusion- Mg 1.6-1.9 mg/dL, 4 g, Intravenous, PRN, Laisha Siddiqui PA-C, Last Rate: 25 mL/hr at 08/04/22 0653, 4 g at 08/04/22 0653  •  niCARdipine (CARDENE) 20 mg in 200 mL NS infusion, 5-15 mg/hr, Intravenous, Titrated, Duran Willis MD, Held at 07/31/22 1439  •  nicotine (NICODERM CQ) 21 MG/24HR patch 1 patch, 1 patch, Transdermal, Q24H, Laisha Siddiqui PA-C, 1 patch at 08/03/22 0857  •  niMODipine  (NIMOTOP) capsule 60 mg, 60 mg, Oral, Q4H, Duran Willis MD, 60 mg at 08/04/22 0530  •  ondansetron (ZOFRAN) injection 4 mg, 4 mg, Intravenous, Q6H PRN, Duran Willis MD, 4 mg at 08/01/22 1223  •  oxyCODONE-acetaminophen (PERCOCET) 7.5-325 MG per tablet 1 tablet, 1 tablet, Oral, Q4H PRN, Duran Willis MD, 1 tablet at 08/04/22 0258  •  pantoprazole (PROTONIX) EC tablet 40 mg, 40 mg, Oral, Q AM, Case, Mary V., DO, 40 mg at 08/04/22 0530  •  simethicone (MYLICON) chewable tablet 80 mg, 80 mg, Oral, 4x Daily PRN, Case, Mary V., DO  •  sodium chloride (HYPERTONIC) 3 % infusion, 150 mL/hr, Intravenous, Continuous, Laisha Siddiqui PA-C, Last Rate: 150 mL/hr at 08/04/22 0347, 150 mL/hr at 08/04/22 0347  •  sodium chloride tablet 2 g, 2 g, Oral, TID With Meals, Laisha Siddiqui PA-C, 2 g at 08/03/22 1802  •  ticagrelor (BRILINTA) tablet 90 mg, 90 mg, Oral, BID, Duran Willis MD, 90 mg at 08/03/22 2011     Laboratory Results:       Lab 08/01/22  0501   WBC 22.15*   HEMOGLOBIN 10.4*   HEMATOCRIT 33.6*   PLATELETS 437   NEUTROS ABS 19.85*   IMMATURE GRANS (ABS) 0.28*   LYMPHS ABS 0.70   MONOS ABS 1.29*   EOS ABS 0.00   MCV 82.2         Lab 08/04/22  0530 08/03/22  2358 08/03/22  1807 08/03/22  1210 08/03/22  0505 08/02/22  1208 08/02/22  0540 08/01/22  1304 08/01/22  0501 07/31/22  1830 07/31/22  1415 07/31/22  0505   SODIUM 140 140  140 136 138 140  140   < > 137   < > 138   < > 137 138   POTASSIUM  --  4.2  --   --  4.2  --  4.2  --  4.6  --   --  4.3   CHLORIDE  --  107  --   --  106  --  104  --  106  --   --  103   CO2  --  23.0  --   --  25.0  --  24.0  --  26.0  --   --  28.0   ANION GAP  --  10.0  --   --  9.0  --  9.0  --  6.0  --   --  7.0   BUN  --  14  --   --  13  --  13  --  15  --   --  16   CREATININE  --  0.56*  --   --  0.48*  --  0.57  --  0.49*  --   --  0.48*   EGFR  --  114.1  --   --  118.5  --  113.7  --  117.9  --   --  118.5   GLUCOSE  --  233*   --   --  118*  --  218*  --  146*  --   --  133*   CALCIUM  --  8.1*  --   --  7.7*  --  7.9*  --  8.1*  --   --  8.1*   MAGNESIUM  --  1.8  --   --  1.7  --  1.8  --  2.1  --  1.9 2.7*   PHOSPHORUS  --   --   --   --   --   --   --   --  3.2  --   --   --     < > = values in this interval not displayed.                         Brief Urine Lab Results     None        Microbiology Results (last 10 days)     Procedure Component Value - Date/Time    COVID PRE-OP / PRE-PROCEDURE SCREENING ORDER (NO ISOLATION) - Swab, Nasopharynx [[id number]]  (Normal) Collected: 07/25/22 1559    Lab Status: Final result Specimen: Swab from Nasopharynx Updated: 07/25/22 1708    Narrative:      The following orders were created for panel order COVID PRE-OP / PRE-PROCEDURE SCREENING ORDER (NO ISOLATION) - Swab, Nasopharynx.  Procedure                               Abnormality         Status                     ---------                               -----------         ------                     Respiratory Panel PCR w/...[[id number]]  Normal              Final result                 Please view results for these tests on the individual orders.    Respiratory Panel PCR w/COVID-19(SARS-CoV-2) MAURICE/ROBERTO/HERBERT/PAD/COR/MAD/JAYLYN In-House, NP Swab in UTM/VTM, 3-4 HR TAT - Swab, Nasopharynx [[id number]]  (Normal) Collected: 07/25/22 1559    Lab Status: Final result Specimen: Swab from Nasopharynx Updated: 07/25/22 1708     ADENOVIRUS, PCR Not Detected     Coronavirus 229E Not Detected     Coronavirus HKU1 Not Detected     Coronavirus NL63 Not Detected     Coronavirus OC43 Not Detected     COVID19 Not Detected     Human Metapneumovirus Not Detected     Human Rhinovirus/Enterovirus Not Detected     Influenza A PCR Not Detected     Influenza B PCR Not Detected     Parainfluenza Virus 1 Not Detected     Parainfluenza Virus 2 Not Detected     Parainfluenza Virus 3 Not Detected     Parainfluenza Virus 4 Not Detected     RSV, PCR Not Detected     Bordetella  pertussis pcr Not Detected     Bordetella parapertussis PCR Not Detected     Chlamydophila pneumoniae PCR Not Detected     Mycoplasma pneumo by PCR Not Detected    Narrative:      In the setting of a positive respiratory panel with a viral infection PLUS a negative procalcitonin without other underlying concern for bacterial infection, consider observing off antibiotics or discontinuation of antibiotics and continue supportive care. If the respiratory panel is positive for atypical bacterial infection (Bordetella pertussis, Chlamydophila pneumoniae, or Mycoplasma pneumoniae), consider antibiotic de-escalation to target atypical bacterial infection.           Diagnostic Imaging: I reviewed and independently interpreted the new imaging.     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  -Neurologically, patient has remained stable.  TCD's from yesterday show slight decrease in velocities, will continue to monitor. 3% now at 150 ml/hr, normal saline stopped.  Sodium goal between 145 and 155. Will continue to monitor neurologic exam. Continue Keppra and nimodipine. Appreciate ICU assistance.            Brannon Barrientos MD  08/04/22  08:02 EDT

## 2022-08-05 ENCOUNTER — APPOINTMENT (OUTPATIENT)
Dept: CARDIOLOGY | Facility: HOSPITAL | Age: 47
End: 2022-08-05

## 2022-08-05 PROBLEM — R73.9 HYPERGLYCEMIA: Status: ACTIVE | Noted: 2022-08-05

## 2022-08-05 LAB
ANION GAP SERPL CALCULATED.3IONS-SCNC: 8 MMOL/L (ref 5–15)
BH CV VAS TCD LEFT ACA: 38 CM/SEC
BH CV VAS TCD LEFT DISTAL M1: 130 CM/SEC
BH CV VAS TCD LEFT MID M1: 143 CM/SEC
BH CV VAS TCD LEFT P1: 29 CM/SEC
BH CV VAS TCD LEFT P2: 53 CM/SEC
BH CV VAS TCD LEFT PROXIMAL M1: 69 CM/SEC
BH CV VAS TCD LEFT TERMINAL ICA: 13 CM/SEC
BH CV VAS TCD RIGHT ACA: 63 CM/SEC
BH CV VAS TCD RIGHT DISTAL M1: 48 CM/SEC
BH CV VAS TCD RIGHT MID M1: 106 CM/SEC
BH CV VAS TCD RIGHT P1: 57 CM/SEC
BH CV VAS TCD RIGHT P2: 48 CM/SEC
BH CV VAS TCD RIGHT PROXIMAL M1: 104 CM/SEC
BH CV VAS TCD RIGHT TERMINAL ICA: 36 CM/SEC
BUN SERPL-MCNC: 13 MG/DL (ref 6–20)
BUN/CREAT SERPL: 32.5 (ref 7–25)
CALCIUM SPEC-SCNC: 8.1 MG/DL (ref 8.6–10.5)
CHLORIDE SERPL-SCNC: 105 MMOL/L (ref 98–107)
CO2 SERPL-SCNC: 27 MMOL/L (ref 22–29)
CREAT SERPL-MCNC: 0.4 MG/DL (ref 0.57–1)
EGFRCR SERPLBLD CKD-EPI 2021: 123.8 ML/MIN/1.73
GLUCOSE BLDC GLUCOMTR-MCNC: 101 MG/DL (ref 70–130)
GLUCOSE BLDC GLUCOMTR-MCNC: 102 MG/DL (ref 70–130)
GLUCOSE BLDC GLUCOMTR-MCNC: 103 MG/DL (ref 70–130)
GLUCOSE BLDC GLUCOMTR-MCNC: 108 MG/DL (ref 70–130)
GLUCOSE SERPL-MCNC: 120 MG/DL (ref 65–99)
MAGNESIUM SERPL-MCNC: 1.7 MG/DL (ref 1.6–2.6)
MAXIMAL PREDICTED HEART RATE: 174 BPM
POTASSIUM SERPL-SCNC: 4.6 MMOL/L (ref 3.5–5.2)
SODIUM SERPL-SCNC: 140 MMOL/L (ref 136–145)
SODIUM SERPL-SCNC: 141 MMOL/L (ref 136–145)
STRESS TARGET HR: 148 BPM

## 2022-08-05 PROCEDURE — 93886 INTRACRANIAL COMPLETE STUDY: CPT

## 2022-08-05 PROCEDURE — 99232 SBSQ HOSP IP/OBS MODERATE 35: CPT | Performed by: INTERNAL MEDICINE

## 2022-08-05 PROCEDURE — 84295 ASSAY OF SERUM SODIUM: CPT | Performed by: STUDENT IN AN ORGANIZED HEALTH CARE EDUCATION/TRAINING PROGRAM

## 2022-08-05 PROCEDURE — 25010000002 DEXAMETHASONE PER 1 MG: Performed by: PHYSICIAN ASSISTANT

## 2022-08-05 PROCEDURE — 0 MAGNESIUM SULFATE 4 GM/100ML SOLUTION: Performed by: PHYSICIAN ASSISTANT

## 2022-08-05 PROCEDURE — 82962 GLUCOSE BLOOD TEST: CPT

## 2022-08-05 PROCEDURE — 92507 TX SP LANG VOICE COMM INDIV: CPT

## 2022-08-05 PROCEDURE — 99232 SBSQ HOSP IP/OBS MODERATE 35: CPT | Performed by: STUDENT IN AN ORGANIZED HEALTH CARE EDUCATION/TRAINING PROGRAM

## 2022-08-05 PROCEDURE — 84295 ASSAY OF SERUM SODIUM: CPT | Performed by: NEUROLOGICAL SURGERY

## 2022-08-05 PROCEDURE — 25010000002 HYDROMORPHONE 1 MG/ML SOLUTION: Performed by: NEUROLOGICAL SURGERY

## 2022-08-05 PROCEDURE — 63710000001 INSULIN DETEMIR PER 5 UNITS: Performed by: INTERNAL MEDICINE

## 2022-08-05 RX ORDER — LISINOPRIL 5 MG/1
5 TABLET ORAL ONCE
Status: COMPLETED | OUTPATIENT
Start: 2022-08-05 | End: 2022-08-05

## 2022-08-05 RX ORDER — LISINOPRIL 10 MG/1
10 TABLET ORAL
Status: DISCONTINUED | OUTPATIENT
Start: 2022-08-06 | End: 2022-08-11 | Stop reason: HOSPADM

## 2022-08-05 RX ADMIN — LISINOPRIL 5 MG: 5 TABLET ORAL at 08:31

## 2022-08-05 RX ADMIN — BISACODYL 5 MG: 5 TABLET, COATED ORAL at 08:31

## 2022-08-05 RX ADMIN — VASOPRESSIN 100 ML/HR: 20 INJECTION, SOLUTION INTRAVENOUS at 20:32

## 2022-08-05 RX ADMIN — TICAGRELOR 90 MG: 90 TABLET ORAL at 20:25

## 2022-08-05 RX ADMIN — SODIUM CHLORIDE 2 G: 1 TABLET ORAL at 12:00

## 2022-08-05 RX ADMIN — INSULIN DETEMIR 5 UNITS: 100 INJECTION, SOLUTION SUBCUTANEOUS at 08:29

## 2022-08-05 RX ADMIN — SENNOSIDES AND DOCUSATE SODIUM 2 TABLET: 50; 8.6 TABLET ORAL at 08:31

## 2022-08-05 RX ADMIN — MAGNESIUM SULFATE HEPTAHYDRATE 4 G: 40 INJECTION, SOLUTION INTRAVENOUS at 03:06

## 2022-08-05 RX ADMIN — VASOPRESSIN 150 ML/HR: 20 INJECTION, SOLUTION INTRAVENOUS at 03:50

## 2022-08-05 RX ADMIN — SENNOSIDES AND DOCUSATE SODIUM 2 TABLET: 50; 8.6 TABLET ORAL at 20:25

## 2022-08-05 RX ADMIN — POLYETHYLENE GLYCOL 3350 17 G: 17 POWDER, FOR SOLUTION ORAL at 08:30

## 2022-08-05 RX ADMIN — HYDROMORPHONE HYDROCHLORIDE 1 MG: 1 INJECTION, SOLUTION INTRAMUSCULAR; INTRAVENOUS; SUBCUTANEOUS at 08:31

## 2022-08-05 RX ADMIN — BISACODYL 10 MG: 10 SUPPOSITORY RECTAL at 12:20

## 2022-08-05 RX ADMIN — TICAGRELOR 90 MG: 90 TABLET ORAL at 08:32

## 2022-08-05 RX ADMIN — VASOPRESSIN 150 ML/HR: 20 INJECTION, SOLUTION INTRAVENOUS at 07:34

## 2022-08-05 RX ADMIN — INSULIN DETEMIR 5 UNITS: 100 INJECTION, SOLUTION SUBCUTANEOUS at 20:25

## 2022-08-05 RX ADMIN — HYDROMORPHONE HYDROCHLORIDE 1 MG: 1 INJECTION, SOLUTION INTRAMUSCULAR; INTRAVENOUS; SUBCUTANEOUS at 20:29

## 2022-08-05 RX ADMIN — VASOPRESSIN 100 ML/HR: 20 INJECTION, SOLUTION INTRAVENOUS at 15:57

## 2022-08-05 RX ADMIN — NIMODIPINE 60 MG: 30 CAPSULE, LIQUID FILLED ORAL at 10:03

## 2022-08-05 RX ADMIN — HYDROMORPHONE HYDROCHLORIDE 1 MG: 1 INJECTION, SOLUTION INTRAMUSCULAR; INTRAVENOUS; SUBCUTANEOUS at 23:59

## 2022-08-05 RX ADMIN — OXYCODONE HYDROCHLORIDE AND ACETAMINOPHEN 1 TABLET: 7.5; 325 TABLET ORAL at 22:12

## 2022-08-05 RX ADMIN — OXYCODONE HYDROCHLORIDE AND ACETAMINOPHEN 1 TABLET: 7.5; 325 TABLET ORAL at 06:07

## 2022-08-05 RX ADMIN — NIMODIPINE 60 MG: 30 CAPSULE, LIQUID FILLED ORAL at 03:06

## 2022-08-05 RX ADMIN — HYDROMORPHONE HYDROCHLORIDE 1 MG: 1 INJECTION, SOLUTION INTRAMUSCULAR; INTRAVENOUS; SUBCUTANEOUS at 03:06

## 2022-08-05 RX ADMIN — LEVETIRACETAM 500 MG: 500 TABLET, FILM COATED ORAL at 08:31

## 2022-08-05 RX ADMIN — SODIUM CHLORIDE 2 G: 1 TABLET ORAL at 17:55

## 2022-08-05 RX ADMIN — ACETAMINOPHEN 325MG 325 MG: 325 TABLET ORAL at 08:31

## 2022-08-05 RX ADMIN — NIMODIPINE 60 MG: 30 CAPSULE, LIQUID FILLED ORAL at 06:06

## 2022-08-05 RX ADMIN — LEVETIRACETAM 500 MG: 500 TABLET, FILM COATED ORAL at 20:24

## 2022-08-05 RX ADMIN — LISINOPRIL 5 MG: 5 TABLET ORAL at 12:20

## 2022-08-05 RX ADMIN — DEXAMETHASONE SODIUM PHOSPHATE 4 MG: 4 INJECTION, SOLUTION INTRA-ARTICULAR; INTRALESIONAL; INTRAMUSCULAR; INTRAVENOUS; SOFT TISSUE at 03:06

## 2022-08-05 RX ADMIN — SODIUM CHLORIDE 2 G: 1 TABLET ORAL at 08:30

## 2022-08-05 RX ADMIN — VASOPRESSIN 150 ML/HR: 20 INJECTION, SOLUTION INTRAVENOUS at 10:41

## 2022-08-05 RX ADMIN — OXYCODONE HYDROCHLORIDE AND ACETAMINOPHEN 1 TABLET: 7.5; 325 TABLET ORAL at 14:52

## 2022-08-05 RX ADMIN — BUSPIRONE HYDROCHLORIDE 10 MG: 10 TABLET ORAL at 08:32

## 2022-08-05 RX ADMIN — VASOPRESSIN 150 ML/HR: 20 INJECTION, SOLUTION INTRAVENOUS at 00:30

## 2022-08-05 RX ADMIN — ASPIRIN 81 MG CHEWABLE TABLET 81 MG: 81 TABLET CHEWABLE at 08:31

## 2022-08-05 RX ADMIN — NIMODIPINE 60 MG: 30 CAPSULE, LIQUID FILLED ORAL at 17:55

## 2022-08-05 RX ADMIN — DEXAMETHASONE SODIUM PHOSPHATE 4 MG: 4 INJECTION, SOLUTION INTRA-ARTICULAR; INTRALESIONAL; INTRAMUSCULAR; INTRAVENOUS; SOFT TISSUE at 10:03

## 2022-08-05 RX ADMIN — NIMODIPINE 60 MG: 30 CAPSULE, LIQUID FILLED ORAL at 22:06

## 2022-08-05 RX ADMIN — Medication 1 PATCH: at 08:32

## 2022-08-05 RX ADMIN — PANTOPRAZOLE SODIUM 40 MG: 40 TABLET, DELAYED RELEASE ORAL at 06:07

## 2022-08-05 RX ADMIN — NIMODIPINE 60 MG: 30 CAPSULE, LIQUID FILLED ORAL at 14:53

## 2022-08-05 NOTE — PLAN OF CARE
Goal Outcome Evaluation:   SLP treatment completed. Will sign-off for cognitive communication. Please see note for further details and recommendations.

## 2022-08-05 NOTE — PROGRESS NOTES
"NEUROSURGERY PROGRESS NOTE    Chief Complaint: Ruptured basilar artery aneurysm    Subjective: Neurologically stable overnight, patient was agitated this morning and initially refused medications.  She eventually took them.    Objective    Vital Signs: Blood pressure (!) 189/94, pulse 55, temperature 98.3 °F (36.8 °C), temperature source Oral, resp. rate 16, height 170.2 cm (67\"), weight 102 kg (225 lb), last menstrual period 05/08/2018, SpO2 97 %, not currently breastfeeding.    Physical Exam  Awake, alert and oriented x 3  Opens eyes spont  Pupils 3 mm rx bilat  Extraocular muscles intact bilaterally  Face symmetric bilaterally  Tongue midline  5/5 in all 4 ext  No pronator drift    Intake/Output:     Intake/Output Summary (Last 24 hours) at 8/5/2022 1127  Last data filed at 8/5/2022 1000  Gross per 24 hour   Intake 6826.1 ml   Output 7875 ml   Net -1048.9 ml       Current Medications:   Current Facility-Administered Medications:   •  acetaminophen (TYLENOL) tablet 325 mg, 325 mg, Oral, Daily, Duran Willis MD, 325 mg at 08/05/22 0831  •  acetaminophen (TYLENOL) tablet 650 mg, 650 mg, Oral, Q4H PRN, Duran Willis MD, 650 mg at 08/01/22 0356  •  aspirin chewable tablet 81 mg, 81 mg, Oral, Daily, Duran Willis MD, 81 mg at 08/05/22 0831  •  sennosides-docusate (PERICOLACE) 8.6-50 MG per tablet 2 tablet, 2 tablet, Oral, BID, 2 tablet at 08/05/22 0831 **AND** polyethylene glycol (MIRALAX) packet 17 g, 17 g, Oral, Daily, 17 g at 08/05/22 0830 **AND** bisacodyl (DULCOLAX) EC tablet 5 mg, 5 mg, Oral, Daily PRN, 5 mg at 08/05/22 0831 **AND** bisacodyl (DULCOLAX) suppository 10 mg, 10 mg, Rectal, Daily PRN, Case, Mary V., DO, 10 mg at 08/03/22 1607  •  busPIRone (BUSPAR) tablet 10 mg, 10 mg, Oral, TID PRN, Case, Mary V., DO, 10 mg at 08/05/22 0832  •  dexamethasone (DECADRON) injection 4 mg, 4 mg, Intravenous, Q6H, Laisha Siddiqui PA-C, 4 mg at 08/05/22 1003  •  dextrose (D50W) " (25 g/50 mL) IV injection 25 g, 25 g, Intravenous, Q15 Min PRN, Laisha Siddiqui PA-C  •  dextrose (GLUTOSE) oral gel 15 g, 15 g, Oral, Q15 Min PRN, Laisha Siddiqui PA-C  •  enalaprilat (VASOTEC) injection 1.25 mg, 1.25 mg, Intravenous, Q6H PRN, Duran Willis MD, 1.25 mg at 07/29/22 2112  •  glucagon (human recombinant) (GLUCAGEN DIAGNOSTIC) injection 1 mg, 1 mg, Intramuscular, Q15 Min PRN, Liasha Siddiqui PA-C  •  hydrALAZINE (APRESOLINE) injection 10 mg, 10 mg, Intravenous, Q4H PRN, Duran Willis MD, 10 mg at 07/30/22 0040  •  HYDROmorphone (DILAUDID) injection 1 mg, 1 mg, Intravenous, Q4H PRN, Duran Willis MD, 1 mg at 08/05/22 0831  •  insulin detemir (LEVEMIR) injection 5 Units, 5 Units, Subcutaneous, Q12H, Case, Mary V., DO, 5 Units at 08/05/22 0829  •  Insulin Lispro (humaLOG) injection 0-7 Units, 0-7 Units, Subcutaneous, TID AC, Laisha Siddiqui PA-C, 2 Units at 08/04/22 1716  •  labetalol (NORMODYNE,TRANDATE) injection 10 mg, 10 mg, Intravenous, Q10 Min PRN, Duran Willis MD  •  levETIRAcetam (KEPPRA) tablet 500 mg, 500 mg, Oral, BID, Duran Willis MD, 500 mg at 08/05/22 0831  •  [START ON 8/6/2022] lisinopril (PRINIVIL,ZESTRIL) tablet 10 mg, 10 mg, Oral, Q24H, Case, Mary V., DO  •  lisinopril (PRINIVIL,ZESTRIL) tablet 5 mg, 5 mg, Oral, Once, Case, Mary V., DO  •  Magnesium Sulfate 2 gram Bolus, followed by 8 gram infusion (total Mg dose 10 grams)- Mg less than or equal to 1mg/dL, 2 g, Intravenous, PRN **OR** Magnesium Sulfate 2 gram / 50mL Infusion (GIVE X 3 BAGS TO EQUAL 6GM TOTAL DOSE) - Mg 1.1 - 1.5 mg/dl, 2 g, Intravenous, PRN **OR** Magnesium Sulfate 4 gram infusion- Mg 1.6-1.9 mg/dL, 4 g, Intravenous, PRN, Laisha Siddiqui, JORGE, Last Rate: 25 mL/hr at 08/05/22 0306, 4 g at 08/05/22 0306  •  niCARdipine (CARDENE) 20 mg in 200 mL NS infusion, 5-15 mg/hr, Intravenous, Titrated, Duran Willis MD, Held at 07/31/22 6079  •   nicotine (NICODERM CQ) 21 MG/24HR patch 1 patch, 1 patch, Transdermal, Q24H, Laisha Siddiqui PA-C, 1 patch at 08/05/22 0832  •  niMODipine (NIMOTOP) capsule 60 mg, 60 mg, Oral, Q4H, Duran Willis MD, 60 mg at 08/05/22 1003  •  ondansetron (ZOFRAN) injection 4 mg, 4 mg, Intravenous, Q6H PRN, Duran Willis MD, 4 mg at 08/01/22 1223  •  oxyCODONE-acetaminophen (PERCOCET) 7.5-325 MG per tablet 1 tablet, 1 tablet, Oral, Q4H PRN, Duran Willis MD, 1 tablet at 08/05/22 0607  •  pantoprazole (PROTONIX) EC tablet 40 mg, 40 mg, Oral, Q AM, Case, Mary V., DO, 40 mg at 08/05/22 0607  •  simethicone (MYLICON) chewable tablet 80 mg, 80 mg, Oral, 4x Daily PRN, Case, Mary V., DO, 80 mg at 08/04/22 1947  •  sodium chloride (HYPERTONIC) 3 % infusion, 150 mL/hr, Intravenous, Continuous, Modesta Salazar PA-C, Last Rate: 150 mL/hr at 08/05/22 1041, 150 mL/hr at 08/05/22 1041  •  sodium chloride tablet 2 g, 2 g, Oral, TID With Meals, Laisha Siddiqui PA-C, 2 g at 08/05/22 0830  •  ticagrelor (BRILINTA) tablet 90 mg, 90 mg, Oral, BID, Duran Willis MD, 90 mg at 08/05/22 0832     Laboratory Results:       Lab 08/01/22  0501   WBC 22.15*   HEMOGLOBIN 10.4*   HEMATOCRIT 33.6*   PLATELETS 437   NEUTROS ABS 19.85*   IMMATURE GRANS (ABS) 0.28*   LYMPHS ABS 0.70   MONOS ABS 1.29*   EOS ABS 0.00   MCV 82.2         Lab 08/05/22  0608 08/04/22  2329 08/04/22  1817 08/04/22  1218 08/04/22  0530 08/03/22  2358 08/03/22  1210 08/03/22  0505 08/02/22  1208 08/02/22  0540 08/01/22  1304 08/01/22  0501   SODIUM 140 140  140 140 143 140 140  140   < > 140  140   < > 137   < > 138   POTASSIUM  --  4.6  --   --   --  4.2  --  4.2  --  4.2  --  4.6   CHLORIDE  --  105  --   --   --  107  --  106  --  104  --  106   CO2  --  27.0  --   --   --  23.0  --  25.0  --  24.0  --  26.0   ANION GAP  --  8.0  --   --   --  10.0  --  9.0  --  9.0  --  6.0   BUN  --  13  --   --   --  14  --  13  --  13  --  15    CREATININE  --  0.40*  --   --   --  0.56*  --  0.48*  --  0.57  --  0.49*   EGFR  --  123.8  --   --   --  114.1  --  118.5  --  113.7  --  117.9   GLUCOSE  --  120*  --   --   --  233*  --  118*  --  218*  --  146*   CALCIUM  --  8.1*  --   --   --  8.1*  --  7.7*  --  7.9*  --  8.1*   MAGNESIUM  --  1.7  --   --   --  1.8  --  1.7  --  1.8  --  2.1   PHOSPHORUS  --   --   --   --   --   --   --   --   --   --   --  3.2    < > = values in this interval not displayed.                         Brief Urine Lab Results     None        Microbiology Results (last 10 days)     ** No results found for the last 240 hours. **           Diagnostic Imaging: I reviewed and independently interpreted the new imaging.     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  -Neurologically, patient has remained stable.  TCD's look much better today.  We will decrease her 3% to 100 mils per hour. Will continue to monitor neurologic exam. Continue Keppra and nimodipine. Appreciate ICU assistance.         Brannon Barrientos MD  08/05/22  11:27 EDT

## 2022-08-05 NOTE — PROGRESS NOTES
INTENSIVIST   PROGRESS NOTE     Hospital:  LOS: 11 days     Ms. Mary Celis, 46 y.o. female is followed for a Chief Complaint of: SAH, Headache      Subjective   S     Interval History:  No acute events.        The patient's relevant past medical, surgical and social history were reviewed and updated in Epic as appropriate.      ROS:   Constitutional: Negative for fever.   Respiratory: Negative for dyspnea.   Cardiovascular: Negative for chest pain.   Gastrointestinal: Negative for  nausea, vomiting and diarrhea.     Objective   O     Vitals:  Temp  Min: 97.4 °F (36.3 °C)  Max: 99.4 °F (37.4 °C)  BP  Min: 162/65  Max: 228/103  Pulse  Min: 51  Max: 76  Resp  Min: 16  Max: 20  SpO2  Min: 95 %  Max: 100 % No data recorded    Intake/Ouptut 24 hrs (7:00AM - 6:59 AM)  Intake & Output (last 3 days)       08/02 0701  08/03 0700 08/03 0701  08/04 0700 08/04 0701  08/05 0700 08/05 0701  08/06 0700    P.O. 2195 2357 3730 400    I.V. (mL/kg) 3678.4 (36.1) 3646 (36.8) 3638.7 (35.7) 587.1 (5.8)    Total Intake(mL/kg) 5873.4 (57.6) 6003 (60.5) 7368.7 (72.2) 987.1 (9.7)    Urine (mL/kg/hr) 4725 (1.9) 6500 (2.7) 8075 (3.3) 975 (2.1)    Stool  0 0     Total Output 4725 6500 8075 975    Net +1148.4 -497 -706.3 +12.1            Stool Unmeasured Occurrence  3 x 1 x           Medications (drips):  niCARdipine, Last Rate: Stopped (07/31/22 1439)  sodium chloride, Last Rate: 150 mL/hr (08/05/22 1041)          Physical Examination  Telemetry:  Normal sinus rhythm.    Constitutional:  No acute distress.  Resting in bed in the dark.    Eyes: No scleral icterus.   PERRL, EOM intact.    Neck:  Supple, FROM   Cardiovascular: Normal rate, regular and rhythm. Normal heart sounds.  No murmurs, gallop or rub.   Respiratory: No respiratory distress. Normal respiratory effort.  Normal breath sounds  Clear to auscultation   Abdominal:  Soft. No masses. Nontender. No distension. No HSM.   Extremities: No digital cyanosis. No clubbing.  No peripheral  edema.   Skin: No rashes, lesions or ulcers   Neurological:   Alert and Oriented to person, place, and time.              Results from last 7 days   Lab Units 08/01/22  0501   WBC 10*3/mm3 22.15*   HEMOGLOBIN g/dL 10.4*   MCV fL 82.2   PLATELETS 10*3/mm3 437     Results from last 7 days   Lab Units 08/05/22  0608 08/04/22  2329 08/04/22  1817 08/04/22  0530 08/03/22  2358 08/03/22  1210 08/03/22  0505 08/01/22  1304 08/01/22  0501   SODIUM mmol/L 140 140  140 140   < > 140  140   < > 140  140   < > 138   POTASSIUM mmol/L  --  4.6  --   --  4.2  --  4.2   < > 4.6   CO2 mmol/L  --  27.0  --   --  23.0  --  25.0   < > 26.0   CREATININE mg/dL  --  0.40*  --   --  0.56*  --  0.48*   < > 0.49*   GLUCOSE mg/dL  --  120*  --   --  233*  --  118*   < > 146*   MAGNESIUM mg/dL  --  1.7  --   --  1.8  --  1.7   < > 2.1   PHOSPHORUS mg/dL  --   --   --   --   --   --   --   --  3.2    < > = values in this interval not displayed.     Estimated Creatinine Clearance: 215.8 mL/min (A) (by C-G formula based on SCr of 0.4 mg/dL (L)).              Images:  Imaging Results (Last 24 Hours)     ** No results found for the last 24 hours. **            Results: Reviewed.  I reviewed the patient's new laboratory and imaging results.  I independently reviewed the patient's new images.    Medications: Reviewed.    Assessment & Plan   A / P     Ms. Celis is a 47yo F who presented to [facility] for headache. Imaging revealed a large subarachnoid hemorrhage with intraventricular involvement but no evidence of aneurysm and was transferred to [facility] on 7/25/22 for neurosurgical evaluation. Cerebral angiogram on 7/26 was negative for AVM or aneurysm. UDS was positive for methamphetamines and opiates. She was started on Nimotop, Keppra and daily TCD. 3% Saline was started.     She was taken back to the cath lab by Dr. 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     TCDs show improving velocities.      LUE duplex  from 8/2/22 shows a superficial thrombophlebitis.     Nutrition:   Diet Regular  Advance Directives:   Code Status and Medical Interventions:   Ordered at: 07/26/22 1548     Level Of Support Discussed With:    Patient     Code Status (Patient has no pulse and is not breathing):    CPR (Attempt to Resuscitate)     Medical Interventions (Patient has pulse or is breathing):    Full Support       Active Hospital Problems    Diagnosis    • **SAH     • Hyperglycemia    • Smoker    • Class 1 obesity in adult    • Illicit drug use (UDS + meth/amphetamines, opiates, and oxycodone)     • Essential hypertension    • Iron deficiency anemia due to chronic blood loss        Assessment / Plan:    1. 3% hypertonic saline. Goal -155.   2. SBP goal .   3. Daily TCDs  4. Nimotop, Decadron and Keppra  5. ASA and Brilinta  6. Continue current insulin regimen.   7. AM labs  8. To remain in ICU.     High level of risk due to:  severe exacerbation of chronic illness and illness with threat to life or bodily function.    Plan of care and goals reviewed during interdisciplinary rounds.  I discussed the patient's findings and my recommendations with patient and nursing staff        Mary Batista, DO    Intensive Care Medicine and Pulmonary Medicine

## 2022-08-05 NOTE — THERAPY TREATMENT NOTE
Acute Care - Speech Language Pathology Treatment Note  [facility]     Patient Name: Mary Celis  : 1975  MRN: [id number]  Today's Date: 2022               Admit Date: 2022     Visit Dx:    ICD-10-CM ICD-9-CM   1. SAH (subarachnoid hemorrhage) (HCC)  I60.9 430   2. Essential hypertension  I10 401.9   3. Iron malabsorption  K90.9 579.8   4. Right upper quadrant abdominal pain  R10.11 789.01   5. Iron deficiency anemia due to chronic blood loss  D50.0 280.0   6. Acute nonintractable headache, unspecified headache type  R51.9 784.0   7. Cognitive communication deficit  R41.841 799.52     Patient Active Problem List   Diagnosis   • Iron deficiency anemia due to chronic blood loss   • Right upper quadrant abdominal pain   • Iron malabsorption   • SAH    • Essential hypertension   • Smoker   • Class 1 obesity in adult   • Illicit drug use (UDS + meth/amphetamines, opiates, and oxycodone)    • Hyperglycemia     Past Medical History:   Diagnosis Date   • Abnormal Pap smear of cervix    • Anemia    • Arthritis    • Arthritis    • Body piercing     EARS   • Cancer (HCC)     REPORTS PRE-CERVICAL CANCER   • Depression with anxiety    • Gout    • History of blood transfusion    • History of transfusion     REPORTS HAS HAD SEVERAL TRANSFUSIONS, NO REACTIONS   • IBS (irritable bowel syndrome)    • Panic attack    • Tattoo     LEFT FOREARM   • Wears glasses      Past Surgical History:   Procedure Laterality Date   • CEREBRAL ANGIOGRAM N/A 2022    Procedure: Cerebral angiogram;  Surgeon: Spencer Hauser MD;  Location: [facility] INVASIVE LOCATION;  Service: Interventional Radiology;  Laterality: N/A;   • CHOLECYSTECTOMY     • CHOLECYSTECTOMY WITH INTRAOPERATIVE CHOLANGIOGRAM N/A 2017    Procedure: CHOLECYSTECTOMY LAPAROSCOPIC INTRAOPERATIVE CHOLANGIOGRAM;  Surgeon: Albaro Lopez MD;  Location: [facility] OR;  Service:    • COLONOSCOPY N/A 2017    Procedure: COLONOSCOPY;  Surgeon: Albaro Lopez MD;   Location:  [facility]COPY;  Service:    • D & C HYSTEROSCOPY      VITAL SIGNS UNSTABLE WITH THIS PROCEDURE   • ENDOSCOPY N/A 7/28/2017    Procedure: ESOPHAGOGASTRODUODENOSCOPY WITH BIOPSIES;  Surgeon: Albaro Lopez MD;  Location: [facility] ENDOSCOPY;  Service:    • INTERVENTIONAL RADIOLOGY PROCEDURE Bilateral 7/31/2022    Procedure: CAROTID CEREBRAL ANGIOGRAM BILATERAL;  Surgeon: Duran Willis MD;  Location:  [facility] CATH INVASIVE LOCATION;  Service: Interventional Radiology;  Laterality: Bilateral;   • TUBAL ABDOMINAL LIGATION     • WISDOM TOOTH EXTRACTION         SLP Recommendation and Plan              Anticipated Discharge Disposition (SLP): unknown (08/05/22 1535)           Daily Summary of Progress (SLP): progress toward functional goals as expected (08/05/22 1535)           Treatment Assessment (SLP): Initial deficits noted on initial eval have resolved. Focused on higher level cognitive tasks this session, reading and writing abilities are grossly functional. Pt and daughter report pt's cognitive function is c/w baseline. SLP will sign off for speech, language, and cognitive comunication. If further concerns arise, please re-consult. (08/05/22 1535)  Plan for Continued Treatment (SLP): treatment no longer indicated as all goals met (08/05/22 1535)         SLP EVALUATION (last 72 hours)     SLP SLC Evaluation     Row Name 08/05/22 1535                   Communication Assessment/Intervention    Document Type therapy note (daily note)  -        Subjective Information no complaints  -        Patient Observations alert;cooperative  -        Patient/Family/Caregiver Comments/Observations Daughter present  -        Patient Effort good  -        Symptoms Noted During/After Treatment none  -                  Pain    Additional Documentation Pain Scale: FACES Pre/Post-Treatment (Group)  -                  Pain Scale: FACES Pre/Post-Treatment    Pain: FACES Scale, Pretreatment 0-->no hurt  -         Posttreatment Pain Rating 0-->no hurt  -                  SLP Treatment Clinical Impressions    Treatment Assessment (SLP) Initial deficits noted on initial eval have resolved. Focused on higher level cognitive tasks this session, reading and writing abilities are grossly functional. Pt and daughter report pt's cognitive function is c/w baseline. SLP will sign off for speech, language, and cognitive comunication. If further concerns arise, please re-consult.  -        Daily Summary of Progress (SLP) progress toward functional goals as expected  -        Plan for Continued Treatment (SLP) treatment no longer indicated as all goals met  -        Care Plan Review evaluation/treatment results reviewed  -        Care Plan Review, Other Participant(s) daughter  -                  Recommendations    Anticipated Discharge Disposition (SLP) unknown  -              User Key  (r) = Recorded By, (t) = Taken By, (c) = Cosigned By    Initials Name Effective Dates     Chelsie Hatch, MS, CFY-SLP 06/22/22 -                    EDUCATION  The patient has been educated in the following areas:     Cognitive Impairment Communication Impairment.           SLP GOALS     Row Name 08/05/22 1535             Attention Goal 1 (SLP)    Improve Attention by Goal 1 (SLP) looking at speaker;100%;independently (over 90% accuracy)  -      Time Frame (Attention Goal 1, SLP) short term goal (STG)  -      Progress/Outcomes (Attention Goal 1, SLP) goal met  -              Memory Skills Goal 1 (SLP)    Improve Memory Skills Through Goal 1 (SLP) recalling unrelated word lists immediately;recalling unrelated word lists with an imposed delay;90%;independently (over 90% accuracy)  -      Time Frame (Memory Skills Goal 1, SLP) short term goal (STG)  -      Progress/Outcomes (Memory Skills Goal 1, SLP) goal met  -              Organizational Skills Goal 1 (SLP)    Improve Thought Organization Through Goal 1 (SLP) completing  a divergent naming task;completing a convergent naming task;completing a verbal sequencing task;90%;independently (over 90% accuracy)  -MH      Time Frame (Thought Organization Skills Goal 1, SLP) short term goal (STG)  -MH      Progress/Outcomes (Thought Organization Skills Goal 1, SLP) goal met  -MH              Pragmatic Skills Goal 1 (SLP)    Improve Pragmatic Skills Goal 1 (SLP) maintain topic;100%;independently (over 90% accuracy)  -MH      Time Frame (Pragmatic Skills Goal 1, SLP) short term goal (STG)  -MH      Progress/Outcomes (Pragmatic Skills Goal 1, SLP) goal met  -MH              Additional Goal 1 (SLP)    Additional Goal 1, SLP Pt will participate in dx/tx of reading writing to determine further needs.  -MH      Time Frame (Additional Goal 1, SLP) short term goal (STG)  -MH      Barriers (Additional Goal 1, SLP) Reading/ writing skills assessed in dx tx this session, grossly functional.  -MH      Progress/Outcomes (Additional Goal 1, SLP) goal met  -MH              Additional Goal 2 (SLP)    Additional Goal 2, SLP LTG: pt will improve cognitive-communication skills to participate in care w/ 100% accuracy w/o cues  -MH      Time Frame (Additional Goal 2, SLP) by discharge  -MH      Progress/Outcomes (Additional Goal 2, SLP) goal met  -MH            User Key  (r) = Recorded By, (t) = Taken By, (c) = Cosigned By    Initials Name Provider Type    Chelsie Olguin, MS, CFY-SLP Speech and Language Pathologist                        Time Calculation:      Time Calculation- SLP     Row Name 08/05/22 1621             Time Calculation- SLP    SLP Start Time 1535  -MH      SLP Received On 08/05/22  -MH              Untimed Charges    99500-[id number] Treatment/ST Modification Prosth Aug Alter  50  -MH              Total Minutes    Untimed Charges Total Minutes 50  -MH       Total Minutes 50  -MH            User Key  (r) = Recorded By, (t) = Taken By, (c) = Cosigned By    Initials Name Provider Type    LYNN Hatch  Chelsie MÁRQUEZ MS, CFY-SLP Speech and Language Pathologist                Therapy Charges for Today     Code Description Service Date Service Provider Modifiers Qty    [id number]  ST TREATMENT SPEECH 3 8/5/2022 Chelsie Hatch MS, CFY-SLP GN 1                Patient was not wearing a face mask and did not exhibit coughing during this therapy encounter.  Procedure performed was not aerosolizing, did not involve close contact (within 6 feet for at least 15 minutes or longer), and did not involve contact with infectious secretions or specimens.  Therapist used appropriate personal protective equipment including gloves, standard procedure mask and eye protection.  Appropriate PPE was worn during the entire therapy session.  Hand hygiene was completed before and after therapy session.         Chelsie Hatch MS, CFY-SLP  8/5/2022

## 2022-08-06 ENCOUNTER — APPOINTMENT (OUTPATIENT)
Dept: CARDIOLOGY | Facility: HOSPITAL | Age: 47
End: 2022-08-06

## 2022-08-06 LAB
ANION GAP SERPL CALCULATED.3IONS-SCNC: 8 MMOL/L (ref 5–15)
BH CV VAS TCD LEFT ACA: 104 CM/SEC
BH CV VAS TCD LEFT DISTAL M1: 115 CM/SEC
BH CV VAS TCD LEFT MID M1: 69 CM/SEC
BH CV VAS TCD LEFT P1: 61 CM/SEC
BH CV VAS TCD LEFT P2: 23 CM/SEC
BH CV VAS TCD LEFT PROXIMAL M1: 57 CM/SEC
BH CV VAS TCD LEFT TERMINAL ICA: 57 CM/SEC
BH CV VAS TCD RIGHT ACA: 89 CM/SEC
BH CV VAS TCD RIGHT DISTAL M1: 50 CM/SEC
BH CV VAS TCD RIGHT MID M1: 91 CM/SEC
BH CV VAS TCD RIGHT P1: 36 CM/SEC
BH CV VAS TCD RIGHT P2: 15 CM/SEC
BH CV VAS TCD RIGHT PROXIMAL M1: 100 CM/SEC
BH CV VAS TCD RIGHT TERMINAL ICA: 51 CM/SEC
BUN SERPL-MCNC: 17 MG/DL (ref 6–20)
BUN/CREAT SERPL: 32.1 (ref 7–25)
CALCIUM SPEC-SCNC: 7.9 MG/DL (ref 8.6–10.5)
CHLORIDE SERPL-SCNC: 102 MMOL/L (ref 98–107)
CO2 SERPL-SCNC: 28 MMOL/L (ref 22–29)
CREAT SERPL-MCNC: 0.53 MG/DL (ref 0.57–1)
DEPRECATED RDW RBC AUTO: 47.3 FL (ref 37–54)
EGFRCR SERPLBLD CKD-EPI 2021: 115.7 ML/MIN/1.73
ERYTHROCYTE [DISTWIDTH] IN BLOOD BY AUTOMATED COUNT: 15.9 % (ref 12.3–15.4)
GLUCOSE BLDC GLUCOMTR-MCNC: 101 MG/DL (ref 70–130)
GLUCOSE BLDC GLUCOMTR-MCNC: 86 MG/DL (ref 70–130)
GLUCOSE BLDC GLUCOMTR-MCNC: 88 MG/DL (ref 70–130)
GLUCOSE SERPL-MCNC: 100 MG/DL (ref 65–99)
HCT VFR BLD AUTO: 30.1 % (ref 34–46.6)
HGB BLD-MCNC: 9.3 G/DL (ref 12–15.9)
MAGNESIUM SERPL-MCNC: 1.6 MG/DL (ref 1.6–2.6)
MAXIMAL PREDICTED HEART RATE: 174 BPM
MCH RBC QN AUTO: 25.1 PG (ref 26.6–33)
MCHC RBC AUTO-ENTMCNC: 30.9 G/DL (ref 31.5–35.7)
MCV RBC AUTO: 81.1 FL (ref 79–97)
PLATELET # BLD AUTO: 415 10*3/MM3 (ref 140–450)
PMV BLD AUTO: 10.1 FL (ref 6–12)
POTASSIUM SERPL-SCNC: 4 MMOL/L (ref 3.5–5.2)
RBC # BLD AUTO: 3.71 10*6/MM3 (ref 3.77–5.28)
SODIUM SERPL-SCNC: 137 MMOL/L (ref 136–145)
SODIUM SERPL-SCNC: 138 MMOL/L (ref 136–145)
SODIUM SERPL-SCNC: 141 MMOL/L (ref 136–145)
STRESS TARGET HR: 148 BPM
WBC NRBC COR # BLD: 18.31 10*3/MM3 (ref 3.4–10.8)

## 2022-08-06 PROCEDURE — 83735 ASSAY OF MAGNESIUM: CPT | Performed by: NEUROLOGICAL SURGERY

## 2022-08-06 PROCEDURE — 84295 ASSAY OF SERUM SODIUM: CPT | Performed by: STUDENT IN AN ORGANIZED HEALTH CARE EDUCATION/TRAINING PROGRAM

## 2022-08-06 PROCEDURE — 82962 GLUCOSE BLOOD TEST: CPT

## 2022-08-06 PROCEDURE — 99231 SBSQ HOSP IP/OBS SF/LOW 25: CPT | Performed by: RADIOLOGY

## 2022-08-06 PROCEDURE — 85027 COMPLETE CBC AUTOMATED: CPT | Performed by: INTERNAL MEDICINE

## 2022-08-06 PROCEDURE — 0 MAGNESIUM SULFATE 4 GM/100ML SOLUTION: Performed by: PHYSICIAN ASSISTANT

## 2022-08-06 PROCEDURE — 80048 BASIC METABOLIC PNL TOTAL CA: CPT | Performed by: NEUROLOGICAL SURGERY

## 2022-08-06 PROCEDURE — 93886 INTRACRANIAL COMPLETE STUDY: CPT

## 2022-08-06 PROCEDURE — 25010000002 HYDROMORPHONE 1 MG/ML SOLUTION: Performed by: NEUROLOGICAL SURGERY

## 2022-08-06 PROCEDURE — 99232 SBSQ HOSP IP/OBS MODERATE 35: CPT | Performed by: INTERNAL MEDICINE

## 2022-08-06 PROCEDURE — 63710000001 INSULIN DETEMIR PER 5 UNITS: Performed by: INTERNAL MEDICINE

## 2022-08-06 RX ORDER — POTASSIUM CHLORIDE 7.45 MG/ML
10 INJECTION INTRAVENOUS
Status: DISCONTINUED | OUTPATIENT
Start: 2022-08-06 | End: 2022-08-11 | Stop reason: HOSPADM

## 2022-08-06 RX ORDER — MAGNESIUM SULFATE HEPTAHYDRATE 40 MG/ML
2 INJECTION, SOLUTION INTRAVENOUS AS NEEDED
Status: DISCONTINUED | OUTPATIENT
Start: 2022-08-06 | End: 2022-08-11 | Stop reason: HOSPADM

## 2022-08-06 RX ORDER — MAGNESIUM SULFATE HEPTAHYDRATE 40 MG/ML
4 INJECTION, SOLUTION INTRAVENOUS AS NEEDED
Status: DISCONTINUED | OUTPATIENT
Start: 2022-08-06 | End: 2022-08-11 | Stop reason: HOSPADM

## 2022-08-06 RX ORDER — FENTANYL/ROPIVACAINE/NS/PF 2-625MCG/1
15 PLASTIC BAG, INJECTION (ML) EPIDURAL AS NEEDED
Status: DISCONTINUED | OUTPATIENT
Start: 2022-08-06 | End: 2022-08-11 | Stop reason: HOSPADM

## 2022-08-06 RX ORDER — POTASSIUM CHLORIDE 750 MG/1
40 CAPSULE, EXTENDED RELEASE ORAL AS NEEDED
Status: DISCONTINUED | OUTPATIENT
Start: 2022-08-06 | End: 2022-08-11 | Stop reason: HOSPADM

## 2022-08-06 RX ORDER — 3% SODIUM CHLORIDE 3 G/100ML
100 INJECTION, SOLUTION INTRAVENOUS CONTINUOUS
Status: DISCONTINUED | OUTPATIENT
Start: 2022-08-06 | End: 2022-08-06

## 2022-08-06 RX ORDER — POTASSIUM CHLORIDE 1.5 G/1.77G
40 POWDER, FOR SOLUTION ORAL AS NEEDED
Status: DISCONTINUED | OUTPATIENT
Start: 2022-08-06 | End: 2022-08-11 | Stop reason: HOSPADM

## 2022-08-06 RX ADMIN — LEVETIRACETAM 500 MG: 500 TABLET, FILM COATED ORAL at 08:19

## 2022-08-06 RX ADMIN — NIMODIPINE 60 MG: 30 CAPSULE, LIQUID FILLED ORAL at 17:31

## 2022-08-06 RX ADMIN — NIMODIPINE 60 MG: 30 CAPSULE, LIQUID FILLED ORAL at 13:47

## 2022-08-06 RX ADMIN — Medication 1 PATCH: at 08:20

## 2022-08-06 RX ADMIN — SENNOSIDES AND DOCUSATE SODIUM 2 TABLET: 50; 8.6 TABLET ORAL at 08:18

## 2022-08-06 RX ADMIN — LISINOPRIL 10 MG: 10 TABLET ORAL at 08:19

## 2022-08-06 RX ADMIN — OXYCODONE HYDROCHLORIDE AND ACETAMINOPHEN 1 TABLET: 7.5; 325 TABLET ORAL at 10:09

## 2022-08-06 RX ADMIN — SENNOSIDES AND DOCUSATE SODIUM 2 TABLET: 50; 8.6 TABLET ORAL at 20:05

## 2022-08-06 RX ADMIN — INSULIN DETEMIR 5 UNITS: 100 INJECTION, SOLUTION SUBCUTANEOUS at 08:23

## 2022-08-06 RX ADMIN — VASOPRESSIN 100 ML/HR: 20 INJECTION, SOLUTION INTRAVENOUS at 17:31

## 2022-08-06 RX ADMIN — SODIUM CHLORIDE: 234 INJECTION, SOLUTION INTRAVENOUS at 08:43

## 2022-08-06 RX ADMIN — NIMODIPINE 60 MG: 30 CAPSULE, LIQUID FILLED ORAL at 10:09

## 2022-08-06 RX ADMIN — POLYETHYLENE GLYCOL 3350 17 G: 17 POWDER, FOR SOLUTION ORAL at 08:18

## 2022-08-06 RX ADMIN — ACETAMINOPHEN 325MG 325 MG: 325 TABLET ORAL at 08:19

## 2022-08-06 RX ADMIN — MAGNESIUM SULFATE HEPTAHYDRATE 4 G: 40 INJECTION, SOLUTION INTRAVENOUS at 08:43

## 2022-08-06 RX ADMIN — BUSPIRONE HYDROCHLORIDE 10 MG: 10 TABLET ORAL at 00:29

## 2022-08-06 RX ADMIN — LEVETIRACETAM 500 MG: 500 TABLET, FILM COATED ORAL at 20:05

## 2022-08-06 RX ADMIN — BUSPIRONE HYDROCHLORIDE 10 MG: 10 TABLET ORAL at 08:28

## 2022-08-06 RX ADMIN — ASPIRIN 81 MG CHEWABLE TABLET 81 MG: 81 TABLET CHEWABLE at 08:18

## 2022-08-06 RX ADMIN — NIMODIPINE 60 MG: 30 CAPSULE, LIQUID FILLED ORAL at 02:45

## 2022-08-06 RX ADMIN — VASOPRESSIN 100 ML/HR: 20 INJECTION, SOLUTION INTRAVENOUS at 12:27

## 2022-08-06 RX ADMIN — TICAGRELOR 90 MG: 90 TABLET ORAL at 20:05

## 2022-08-06 RX ADMIN — SODIUM CHLORIDE 2 G: 1 TABLET ORAL at 11:32

## 2022-08-06 RX ADMIN — HYDROMORPHONE HYDROCHLORIDE 1 MG: 1 INJECTION, SOLUTION INTRAMUSCULAR; INTRAVENOUS; SUBCUTANEOUS at 20:17

## 2022-08-06 RX ADMIN — SODIUM CHLORIDE 2 G: 1 TABLET ORAL at 17:31

## 2022-08-06 RX ADMIN — TICAGRELOR 90 MG: 90 TABLET ORAL at 08:18

## 2022-08-06 RX ADMIN — PANTOPRAZOLE SODIUM 40 MG: 40 TABLET, DELAYED RELEASE ORAL at 05:15

## 2022-08-06 RX ADMIN — NIMODIPINE 60 MG: 30 CAPSULE, LIQUID FILLED ORAL at 06:18

## 2022-08-06 RX ADMIN — BUSPIRONE HYDROCHLORIDE 10 MG: 10 TABLET ORAL at 18:12

## 2022-08-06 RX ADMIN — NIMODIPINE 60 MG: 30 CAPSULE, LIQUID FILLED ORAL at 22:18

## 2022-08-06 RX ADMIN — OXYCODONE HYDROCHLORIDE AND ACETAMINOPHEN 1 TABLET: 7.5; 325 TABLET ORAL at 15:33

## 2022-08-06 RX ADMIN — SODIUM CHLORIDE: 234 INJECTION, SOLUTION INTRAVENOUS at 05:12

## 2022-08-06 RX ADMIN — OXYCODONE HYDROCHLORIDE AND ACETAMINOPHEN 1 TABLET: 7.5; 325 TABLET ORAL at 05:15

## 2022-08-06 RX ADMIN — SODIUM CHLORIDE 2 G: 1 TABLET ORAL at 08:20

## 2022-08-06 NOTE — PROGRESS NOTES
INTENSIVIST / PULMONARY FOLLOW UP NOTE     Hospital:  LOS: 12 days   Ms. Mary Celis, 46 y.o. female is followed for:     SAH     Iron deficiency anemia due to chronic blood loss    Essential hypertension    Smoker    Class 1 obesity in adult    Illicit drug use (UDS + meth/amphetamines, opiates, and oxycodone)     Hyperglycemia          SUBJECTIVE   No complaints    The patient's relevant past medical, surgical, family, and social history were reviewed    Allergies and medications were reviewed    ROS:    Per subjective, all other systems were reviewed and were negative        OBJECTIVE     Vital Sign Min/Max for last 24 hours:  Temp  Min: 97.8 °F (36.6 °C)  Max: 98.4 °F (36.9 °C)   BP  Min: 123/62  Max: 184/96   Pulse  Min: 53  Max: 67   Resp  Min: 14  Max: 18   SpO2  Min: 96 %  Max: 99 %   No data recorded     Physical Exam:  General Appearance:  No acute distress  Eyes:  No scleral icterus or pallor, pupils normal  Ears, Nose, Mouth, Throat:  Atraumatic, oropharynx clear  Neck:  Trachea midline, thyroid normal  Respiratory:  Clear to auscultation bilaterally, normal effort  Cardiovascular:  Regular rate and rhythm, no murmurs, no peripheral edema  Gastrointestinal:  Soft, non-tender, non-distended, no hepatosplenomegaly  Skin:  Normal temperature, no rash  Psychiatric:  No agitation  Neuro:    Interval:  (shift change)  1a. Level of Consciousness: 0-->Alert, keenly responsive  1b. LOC Questions: 0-->Answers both questions correctly  1c. LOC Commands: 0-->Performs both tasks correctly  2. Best Gaze: 0-->Normal  3. Visual: 0-->No visual loss  4. Facial Palsy: 0-->Normal symmetrical movements  5a. Motor Arm, Left: 0-->No drift, limb holds 90 (or 45) degrees for full 10 secs  5b. Motor Arm, Right: 0-->No drift, limb holds 90 (or 45) degrees for full 10 secs  6a. Motor Leg, Left: 0-->No drift, leg holds 30 degree position for full 5 secs  6b. Motor Leg, Right: 0-->No drift, leg holds 30 degree position for full 5  secs  7. Limb Ataxia: 0-->Absent  8. Sensory: 0-->Normal, no sensory loss  9. Best Language: 0-->No aphasia, normal  10. Dysarthria: 0-->Normal  11. Extinction and Inattention (formerly Neglect): 0-->No abnormality    Total (NIH Stroke Scale): 0      Telemetry:              Hemodynamics:   CVP:     PAP:     PAOP:     CO:     CI:     SVI:     SVR:       SpO2: 97 % SpO2  Min: 96 %  Max: 99 %   Device:      Flow Rate:   No data recorded     Mechanical Ventilator Settings:                                         Intake/Ouptut 24 hrs (7:00AM - 6:59 AM)  Intake & Output (last 3 days)       08/03 0701 08/04 0700 08/04 0701 08/05 0700 08/05 0701 08/06 0700 08/06 0701 08/07 0700    P.O. 2357 3730 1572 600    I.V. (mL/kg) 3646 (36.8) 3638.7 (35.7) 1892.6 (18.6) 1260 (12.4)    Total Intake(mL/kg) 6003 (60.5) 7368.7 (72.2) 3464.6 (34) 1860 (18.2)    Urine (mL/kg/hr) 6500 (2.7) 8075 (3.3) 8100 (3.3) 2725 (4.1)    Stool 0 0 0     Total Output 6500 8075 8100 2725    Net -497 -706.3 -4635.4 -865            Stool Unmeasured Occurrence 3 x 1 x 2 x           Lines, Drains & Airways     Active LDAs     Name Placement date Placement time Site Days    PICC Triple Lumen 07/30/22 Right Basilic 07/30/22  1138  Basilic  7    Urethral Catheter 07/31/22  1100  -- 6    Arterial Line 07/31/22 Left Radial 07/31/22  1034  created via procedure documentation  Radial  6                Hematology:  Results from last 7 days   Lab Units 08/06/22  0512 08/01/22  0501   WBC 10*3/mm3 18.31* 22.15*   HEMOGLOBIN g/dL 9.3* 10.4*   HEMATOCRIT % 30.1* 33.6*   PLATELETS 10*3/mm3 415 437     Electrolytes, Magnesium and Phosphorus:  Results from last 7 days   Lab Units 08/06/22  1126 08/06/22  0512 08/06/22  0023 08/05/22  1158 08/05/22  0608 08/04/22  2329 08/04/22  1817 08/04/22  0530 08/03/22  2358 08/03/22  1210 08/03/22  0505 08/02/22  1208 08/02/22  0540 08/01/22  1304 08/01/22  0501 07/31/22  1830 07/31/22  1415 07/31/22  0505   SODIUM mmol/L 137 138  141 141 140 140  140 140   < > 140  140   < > 140  140   < > 137   < > 138   < > 137 138   CHLORIDE mmol/L  --  102  --   --   --  105  --   --  107  --  106  --  104  --  106  --   --  103   POTASSIUM mmol/L  --  4.0  --   --   --  4.6  --   --  4.2  --  4.2  --  4.2  --  4.6  --   --  4.3   CO2 mmol/L  --  28.0  --   --   --  27.0  --   --  23.0  --  25.0  --  24.0  --  26.0  --   --  28.0   MAGNESIUM mg/dL  --  1.6  --   --   --  1.7  --   --  1.8  --  1.7  --  1.8  --  2.1  --  1.9 2.7*   PHOSPHORUS mg/dL  --   --   --   --   --   --   --   --   --   --   --   --   --   --  3.2  --   --   --     < > = values in this interval not displayed.     Renal:  Results from last 7 days   Lab Units 08/06/22  0512 08/04/22  2329 08/03/22  2358 08/03/22  0505 08/02/22  0540 08/01/22  0501 07/31/22  0505   CREATININE mg/dL 0.53* 0.40* 0.56* 0.48* 0.57 0.49* 0.48*   BUN mg/dL 17 13 14 13 13 15 16     Estimated Creatinine Clearance: 162.5 mL/min (A) (by C-G formula based on SCr of 0.53 mg/dL (L)).  Hepatic:      Arterial Blood Gases:              No results found for: LACTATE    Relevant imaging studies and labs from 08/06/22 were reviewed    Medications (drips):  niCARdipine, Last Rate: Stopped (07/31/22 1439)  sodium chloride 1.5% (HYPERTONIC) 500 mL infusion, Last Rate: 100 mL/hr (08/06/22 1227)        acetaminophen, 325 mg, Oral, Daily  aspirin, 81 mg, Oral, Daily  insulin detemir, 5 Units, Subcutaneous, Q12H  insulin lispro, 0-7 Units, Subcutaneous, TID AC  levETIRAcetam, 500 mg, Oral, BID  lisinopril, 10 mg, Oral, Q24H  nicotine, 1 patch, Transdermal, Q24H  niMODipine, 60 mg, Oral, Q4H  pantoprazole, 40 mg, Oral, Q AM  polyethylene glycol, 17 g, Oral, Daily   And  senna-docusate sodium, 2 tablet, Oral, BID  sodium chloride, 2 g, Oral, TID With Meals  ticagrelor, 90 mg, Oral, BID        acetaminophen  •  senna-docusate sodium **AND** polyethylene glycol **AND** bisacodyl **AND** bisacodyl  •  busPIRone  •  dextrose  •   dextrose  •  enalaprilat  •  glucagon (human recombinant)  •  hydrALAZINE  •  HYDROmorphone  •  labetalol  •  magnesium sulfate **OR** magnesium sulfate **OR** magnesium sulfate  •  ondansetron  •  oxyCODONE-acetaminophen  •  simethicone    Assessment & Plan   IMPRESSION / PLAN     Inpatient Problem List:  46 y.o.female:  Active Hospital Problems    Diagnosis    • **SAH     • Hyperglycemia    • Smoker    • Class 1 obesity in adult    • Illicit drug use (UDS + meth/amphetamines, opiates, and oxycodone)     • Essential hypertension    • Iron deficiency anemia due to chronic blood loss         Hospital Course:  Ms. Celis is a 47yo F who presented to [facility] for headache. Imaging revealed a large subarachnoid hemorrhage with intraventricular involvement but no evidence of aneurysm and was transferred to [facility] on 7/25/22 for neurosurgical evaluation. Cerebral angiogram on 7/26 was negative for AVM or aneurysm. UDS was positive for methamphetamines and opiates. She was started on Nimotop, Keppra and daily TCD. 3% Saline was started.      She was taken back to the cath lab by Dr. 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      TCDs show improving velocities.       LUE duplex from 8/2/22 shows a superficial thrombophlebitis.    Impression:  TCDs improving.  Wants to go home.    Plan:    SAH  Per NS.  Plan today to change from 3% to 1.5% saline, d/c olivia, remain on ASA / Brilinta, 21 days of Nimotop.  Day#14, exiting vasospasm window.  Steroids / keppra also per NS.    Anemia  Leukocytosis  monitor    Hypomagnesemia  replace    Hyperglycemia  Improved, no longer on steroids, d/c levemir, continue correction scale    DVT Prophylaxis  SCDs    Nutrition  Dietary Orders (From admission, onward)     Start     Ordered    07/31/22 1430  Diet Regular  Diet Effective Now        Question:  Diet Texture / Consistency  Answer:  Regular    07/31/22 1430              Plan of care and goals reviewed  with multidisciplinary team at daily rounds         Ethan Burrows MD  Intensive Care Medicine  08/06/22 13:31 EDT

## 2022-08-06 NOTE — PROGRESS NOTES
"NAME: PRATIK MENEZES  : 1975  PCP: System, Provider Not In  ADMITTING PHYSICIAN: Ethan Burrows*    DATE OF ADMISSION:  2022  DATE OF SERVICE: 2022    HOSPITAL DAY:  12 days, SAH day #14    HISTORY OF PRESENT ILLNESS:  46 y.o. female who experienced acute onset of severe headache on the evening of 2022.  She was transferred to [facility] on 2022 and underwent diagnostic angiography on 2022 which failed to demonstrate any aneurysm or \"culprit\" lesion to account for her subarachnoid hemorrhage.  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  The aneurysm was treated with flow diverter therapy on 2022, with subsequent follow-up CT angiogram on 2022 demonstrating occlusion of the aneurysm and preservation of flow in parent vasculature.    REVIEW OF IMAGING:  Transcranial Doppler studies on 2022 demonstrate further interval improvement in cerebral vasospasm.    LABS:  Lab Results   Component Value Date    WBC 18.31 (H) 2022    HGB 9.3 (L) 2022    HCT 30.1 (L) 2022    MCV 81.1 2022     2022     Lab Results   Component Value Date    GLUCOSE 100 (H) 2022    CALCIUM 7.9 (L) 2022     2022    K 4.0 2022    CO2 28.0 2022     2022    BUN 17 2022    CREATININE 0.53 (L) 2022    EGFRIFAFRI >60 2019    EGFRIFNONA 110 2017    BCR 32.1 (H) 2022    ANIONGAP 8.0 2022       CURRENT MEDS:  Current Facility-Administered Medications   Medication Dose Route Frequency Provider Last Rate Last Admin   • acetaminophen (TYLENOL) tablet 325 mg  325 mg Oral Daily Duran Willis MD   325 mg at 22 0819   • acetaminophen (TYLENOL) tablet 650 mg  650 mg Oral Q4H PRN Duran Willis MD   650 mg at 22 0356   • aspirin chewable tablet 81 mg  81 mg Oral Daily Duran Willis MD   81 mg at " 08/06/22 0818   • polyethylene glycol (MIRALAX) packet 17 g  17 g Oral Daily Case, Mary V., DO   17 g at 08/06/22 0818    And   • sennosides-docusate (PERICOLACE) 8.6-50 MG per tablet 2 tablet  2 tablet Oral BID Case, Mary V., DO   2 tablet at 08/06/22 0818    And   • bisacodyl (DULCOLAX) EC tablet 5 mg  5 mg Oral Daily PRN Case, Mary V., DO   5 mg at 08/05/22 0831    And   • bisacodyl (DULCOLAX) suppository 10 mg  10 mg Rectal Daily PRN Case, Mary V., DO   10 mg at 08/05/22 1220   • busPIRone (BUSPAR) tablet 10 mg  10 mg Oral TID PRN Case, Mary V., DO   10 mg at 08/06/22 0828   • dextrose (D50W) (25 g/50 mL) IV injection 25 g  25 g Intravenous Q15 Min PRN Laisha Siddiqui PA-C       • dextrose (GLUTOSE) oral gel 15 g  15 g Oral Q15 Min PRN Laisha Siddiqui PA-C       • enalaprilat (VASOTEC) injection 1.25 mg  1.25 mg Intravenous Q6H PRN Duran Willis MD   1.25 mg at 07/29/22 2112   • glucagon (human recombinant) (GLUCAGEN DIAGNOSTIC) injection 1 mg  1 mg Intramuscular Q15 Min PRN Laisha Siddiqui PA-C       • hydrALAZINE (APRESOLINE) injection 10 mg  10 mg Intravenous Q4H PRN Duran Willis MD   10 mg at 07/30/22 0040   • HYDROmorphone (DILAUDID) injection 1 mg  1 mg Intravenous Q4H PRN Duran Willis MD   1 mg at 08/05/22 2359   • insulin detemir (LEVEMIR) injection 5 Units  5 Units Subcutaneous Q12H Case, Mary V., DO   5 Units at 08/06/22 0823   • Insulin Lispro (humaLOG) injection 0-7 Units  0-7 Units Subcutaneous TID AC Laisha Siddiqui PA-C   2 Units at 08/04/22 1716   • labetalol (NORMODYNE,TRANDATE) injection 10 mg  10 mg Intravenous Q10 Min PRN Duran Willis MD       • levETIRAcetam (KEPPRA) tablet 500 mg  500 mg Oral BID Duran Willis MD   500 mg at 08/06/22 0819   • lisinopril (PRINIVIL,ZESTRIL) tablet 10 mg  10 mg Oral Q24H Case, Mary V., DO   10 mg at 08/06/22 0819   • Magnesium Sulfate 2 gram Bolus, followed by 8 gram  infusion (total Mg dose 10 grams)- Mg less than or equal to 1mg/dL  2 g Intravenous PRN Laisha Siddiqui PA-C        Or   • Magnesium Sulfate 2 gram / 50mL Infusion (GIVE X 3 BAGS TO EQUAL 6GM TOTAL DOSE) - Mg 1.1 - 1.5 mg/dl  2 g Intravenous PRN Laisha Siddiqui PA-C        Or   • Magnesium Sulfate 4 gram infusion- Mg 1.6-1.9 mg/dL  4 g Intravenous PRN Laisha Siddiqui PA-C 25 mL/hr at 08/06/22 0843 4 g at 08/06/22 0843   • niCARdipine (CARDENE) 20 mg in 200 mL NS infusion  5-15 mg/hr Intravenous Titrated Duran Willis MD   Held at 07/31/22 1439   • nicotine (NICODERM CQ) 21 MG/24HR patch 1 patch  1 patch Transdermal Q24H Laisha Siddiqui PA-C   1 patch at 08/06/22 0820   • niMODipine (NIMOTOP) capsule 60 mg  60 mg Oral Q4H Duran Willis MD   60 mg at 08/06/22 1009   • ondansetron (ZOFRAN) injection 4 mg  4 mg Intravenous Q6H PRN Duran Willis MD   4 mg at 08/01/22 1223   • oxyCODONE-acetaminophen (PERCOCET) 7.5-325 MG per tablet 1 tablet  1 tablet Oral Q4H PRN Duran Willis MD   1 tablet at 08/06/22 1009   • pantoprazole (PROTONIX) EC tablet 40 mg  40 mg Oral Q AM Case, Mary V., DO   40 mg at 08/06/22 0515   • simethicone (MYLICON) chewable tablet 80 mg  80 mg Oral 4x Daily PRN Case, Mary V., DO   80 mg at 08/04/22 1947   • sodium chloride 1.5% (HYPERTONIC) 500 mL infusion  100 mL/hr Intravenous Continuous Spencer Hauser MD       • sodium chloride tablet 2 g  2 g Oral TID With Meals Laisha Siddiqui PA-C   2 g at 08/06/22 0820   • ticagrelor (BRILINTA) tablet 90 mg  90 mg Oral BID Duran Willis MD   90 mg at 08/06/22 0818       PHYSICAL EXAM:  Vitals:    08/06/22 1100   BP: 149/76   Pulse: 58   Resp:    Temp:    SpO2: 97%      Alert and oriented x3.  Resting comfortably in bed.  Still has mild-moderate headache, but this is gradually improving.  No facial droop or pronator drift.  Speech is clear.  Tolerating p.o. intake very  well.    ASSESSMENT/PLAN:  Ms. Celis is a 46 y.o. 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  She is now SAH day #14, and her TCD's have been trending down over the past couple of days or so.  I suspect that she is effectively exiting the vasospasm window, and remains neurologically intact.  We will switch her from 3% to 1.5% normal saline, and see if she can maintain her sodium level.  We will also D/C her radial arterial line.  She will need to remain on Brilinta/aspirin dual antiplatelet regimen for the next several months or so, and will need to complete a full 21-day course of Nimotop therapy.    If her sodium remains stable and she remains asymptomatic, we will try to switch her to normal saline tomorrow, with anticipation of possible discharge early next week.  I discussed these extensively with the patient and family at the bedside, and they expressed an understanding and were in agreement with this treatment plan.

## 2022-08-07 ENCOUNTER — APPOINTMENT (OUTPATIENT)
Dept: CT IMAGING | Facility: HOSPITAL | Age: 47
End: 2022-08-07

## 2022-08-07 ENCOUNTER — APPOINTMENT (OUTPATIENT)
Dept: CARDIOLOGY | Facility: HOSPITAL | Age: 47
End: 2022-08-07

## 2022-08-07 LAB
ALBUMIN SERPL-MCNC: 3.5 G/DL (ref 3.5–5.2)
ALBUMIN/GLOB SERPL: 1.7 G/DL
ALP SERPL-CCNC: 59 U/L (ref 39–117)
ALT SERPL W P-5'-P-CCNC: 16 U/L (ref 1–33)
ANION GAP SERPL CALCULATED.3IONS-SCNC: 6 MMOL/L (ref 5–15)
AST SERPL-CCNC: 15 U/L (ref 1–32)
BASOPHILS # BLD AUTO: 0.04 10*3/MM3 (ref 0–0.2)
BASOPHILS NFR BLD AUTO: 0.2 % (ref 0–1.5)
BH CV VAS TCD LEFT ACA: 115 CM/SEC
BH CV VAS TCD LEFT DISTAL M1: 133 CM/SEC
BH CV VAS TCD LEFT MID M1: 100 CM/SEC
BH CV VAS TCD LEFT P1: 41 CM/SEC
BH CV VAS TCD LEFT P2: 29 CM/SEC
BH CV VAS TCD LEFT PROXIMAL M1: 72 CM/SEC
BH CV VAS TCD LEFT TERMINAL ICA: 57 CM/SEC
BH CV VAS TCD RIGHT ACA: 123 CM/SEC
BH CV VAS TCD RIGHT DISTAL M1: 67 CM/SEC
BH CV VAS TCD RIGHT MID M1: 64 CM/SEC
BH CV VAS TCD RIGHT P1: 56 CM/SEC
BH CV VAS TCD RIGHT P2: 27 CM/SEC
BH CV VAS TCD RIGHT PROXIMAL M1: 100 CM/SEC
BH CV VAS TCD RIGHT TERMINAL ICA: 50 CM/SEC
BILIRUB SERPL-MCNC: 0.3 MG/DL (ref 0–1.2)
BUN SERPL-MCNC: 16 MG/DL (ref 6–20)
BUN/CREAT SERPL: 35.6 (ref 7–25)
CALCIUM SPEC-SCNC: 8.4 MG/DL (ref 8.6–10.5)
CHLORIDE SERPL-SCNC: 98 MMOL/L (ref 98–107)
CO2 SERPL-SCNC: 31 MMOL/L (ref 22–29)
CREAT SERPL-MCNC: 0.45 MG/DL (ref 0.57–1)
DEPRECATED RDW RBC AUTO: 47.1 FL (ref 37–54)
EGFRCR SERPLBLD CKD-EPI 2021: 120.3 ML/MIN/1.73
EOSINOPHIL # BLD AUTO: 0.14 10*3/MM3 (ref 0–0.4)
EOSINOPHIL NFR BLD AUTO: 0.8 % (ref 0.3–6.2)
ERYTHROCYTE [DISTWIDTH] IN BLOOD BY AUTOMATED COUNT: 15.9 % (ref 12.3–15.4)
GLOBULIN UR ELPH-MCNC: 2.1 GM/DL
GLUCOSE BLDC GLUCOMTR-MCNC: 103 MG/DL (ref 70–130)
GLUCOSE BLDC GLUCOMTR-MCNC: 114 MG/DL (ref 70–130)
GLUCOSE BLDC GLUCOMTR-MCNC: 123 MG/DL (ref 70–130)
GLUCOSE BLDC GLUCOMTR-MCNC: 135 MG/DL (ref 70–130)
GLUCOSE SERPL-MCNC: 136 MG/DL (ref 65–99)
HCT VFR BLD AUTO: 32.3 % (ref 34–46.6)
HGB BLD-MCNC: 9.9 G/DL (ref 12–15.9)
IMM GRANULOCYTES # BLD AUTO: 0.46 10*3/MM3 (ref 0–0.05)
IMM GRANULOCYTES NFR BLD AUTO: 2.6 % (ref 0–0.5)
LYMPHOCYTES # BLD AUTO: 2.66 10*3/MM3 (ref 0.7–3.1)
LYMPHOCYTES NFR BLD AUTO: 15.1 % (ref 19.6–45.3)
MAGNESIUM SERPL-MCNC: 1.8 MG/DL (ref 1.6–2.6)
MAXIMAL PREDICTED HEART RATE: 174 BPM
MCH RBC QN AUTO: 25.1 PG (ref 26.6–33)
MCHC RBC AUTO-ENTMCNC: 30.7 G/DL (ref 31.5–35.7)
MCV RBC AUTO: 81.8 FL (ref 79–97)
MONOCYTES # BLD AUTO: 1.44 10*3/MM3 (ref 0.1–0.9)
MONOCYTES NFR BLD AUTO: 8.2 % (ref 5–12)
NEUTROPHILS NFR BLD AUTO: 12.89 10*3/MM3 (ref 1.7–7)
NEUTROPHILS NFR BLD AUTO: 73.1 % (ref 42.7–76)
NRBC BLD AUTO-RTO: 0 /100 WBC (ref 0–0.2)
PHOSPHATE SERPL-MCNC: 4.6 MG/DL (ref 2.5–4.5)
PLATELET # BLD AUTO: 400 10*3/MM3 (ref 140–450)
PMV BLD AUTO: 9.7 FL (ref 6–12)
POTASSIUM SERPL-SCNC: 3.8 MMOL/L (ref 3.5–5.2)
PROT SERPL-MCNC: 5.6 G/DL (ref 6–8.5)
RBC # BLD AUTO: 3.95 10*6/MM3 (ref 3.77–5.28)
SODIUM SERPL-SCNC: 135 MMOL/L (ref 136–145)
SODIUM SERPL-SCNC: 135 MMOL/L (ref 136–145)
SODIUM SERPL-SCNC: 138 MMOL/L (ref 136–145)
STRESS TARGET HR: 148 BPM
WBC NRBC COR # BLD: 17.63 10*3/MM3 (ref 3.4–10.8)

## 2022-08-07 PROCEDURE — 80053 COMPREHEN METABOLIC PANEL: CPT | Performed by: INTERNAL MEDICINE

## 2022-08-07 PROCEDURE — 97530 THERAPEUTIC ACTIVITIES: CPT

## 2022-08-07 PROCEDURE — 99232 SBSQ HOSP IP/OBS MODERATE 35: CPT | Performed by: INTERNAL MEDICINE

## 2022-08-07 PROCEDURE — 70450 CT HEAD/BRAIN W/O DYE: CPT

## 2022-08-07 PROCEDURE — 84295 ASSAY OF SERUM SODIUM: CPT | Performed by: STUDENT IN AN ORGANIZED HEALTH CARE EDUCATION/TRAINING PROGRAM

## 2022-08-07 PROCEDURE — 81001 URINALYSIS AUTO W/SCOPE: CPT | Performed by: NURSE PRACTITIONER

## 2022-08-07 PROCEDURE — 0 MAGNESIUM SULFATE 4 GM/100ML SOLUTION: Performed by: INTERNAL MEDICINE

## 2022-08-07 PROCEDURE — 85025 COMPLETE CBC W/AUTO DIFF WBC: CPT | Performed by: INTERNAL MEDICINE

## 2022-08-07 PROCEDURE — 84295 ASSAY OF SERUM SODIUM: CPT | Performed by: NURSE PRACTITIONER

## 2022-08-07 PROCEDURE — 84100 ASSAY OF PHOSPHORUS: CPT | Performed by: INTERNAL MEDICINE

## 2022-08-07 PROCEDURE — 87186 SC STD MICRODIL/AGAR DIL: CPT | Performed by: NURSE PRACTITIONER

## 2022-08-07 PROCEDURE — 97116 GAIT TRAINING THERAPY: CPT

## 2022-08-07 PROCEDURE — 93886 INTRACRANIAL COMPLETE STUDY: CPT

## 2022-08-07 PROCEDURE — 99232 SBSQ HOSP IP/OBS MODERATE 35: CPT | Performed by: RADIOLOGY

## 2022-08-07 PROCEDURE — 87086 URINE CULTURE/COLONY COUNT: CPT | Performed by: NURSE PRACTITIONER

## 2022-08-07 PROCEDURE — 87077 CULTURE AEROBIC IDENTIFY: CPT | Performed by: NURSE PRACTITIONER

## 2022-08-07 PROCEDURE — 82962 GLUCOSE BLOOD TEST: CPT

## 2022-08-07 PROCEDURE — 83735 ASSAY OF MAGNESIUM: CPT | Performed by: NEUROLOGICAL SURGERY

## 2022-08-07 PROCEDURE — 25010000002 HYDROMORPHONE 1 MG/ML SOLUTION: Performed by: NEUROLOGICAL SURGERY

## 2022-08-07 RX ORDER — OXYCODONE AND ACETAMINOPHEN 7.5; 325 MG/1; MG/1
1 TABLET ORAL EVERY 4 HOURS PRN
Status: DISCONTINUED | OUTPATIENT
Start: 2022-08-07 | End: 2022-08-11 | Stop reason: HOSPADM

## 2022-08-07 RX ADMIN — NIMODIPINE 60 MG: 30 CAPSULE, LIQUID FILLED ORAL at 10:13

## 2022-08-07 RX ADMIN — HYDROMORPHONE HYDROCHLORIDE 1 MG: 1 INJECTION, SOLUTION INTRAMUSCULAR; INTRAVENOUS; SUBCUTANEOUS at 00:13

## 2022-08-07 RX ADMIN — ACETAMINOPHEN 325MG 650 MG: 325 TABLET ORAL at 10:13

## 2022-08-07 RX ADMIN — Medication 1 PATCH: at 08:04

## 2022-08-07 RX ADMIN — LEVETIRACETAM 500 MG: 500 TABLET, FILM COATED ORAL at 08:03

## 2022-08-07 RX ADMIN — OXYCODONE HYDROCHLORIDE AND ACETAMINOPHEN 1 TABLET: 7.5; 325 TABLET ORAL at 03:26

## 2022-08-07 RX ADMIN — ACETAMINOPHEN 325MG 325 MG: 325 TABLET ORAL at 08:03

## 2022-08-07 RX ADMIN — VASOPRESSIN 100 ML/HR: 20 INJECTION, SOLUTION INTRAVENOUS at 13:17

## 2022-08-07 RX ADMIN — MAGNESIUM SULFATE HEPTAHYDRATE 4 G: 40 INJECTION, SOLUTION INTRAVENOUS at 16:17

## 2022-08-07 RX ADMIN — OXYCODONE HYDROCHLORIDE AND ACETAMINOPHEN 1 TABLET: 7.5; 325 TABLET ORAL at 16:17

## 2022-08-07 RX ADMIN — ASPIRIN 81 MG CHEWABLE TABLET 81 MG: 81 TABLET CHEWABLE at 08:03

## 2022-08-07 RX ADMIN — VASOPRESSIN 100 ML/HR: 20 INJECTION, SOLUTION INTRAVENOUS at 18:30

## 2022-08-07 RX ADMIN — HYDROMORPHONE HYDROCHLORIDE 1 MG: 1 INJECTION, SOLUTION INTRAMUSCULAR; INTRAVENOUS; SUBCUTANEOUS at 04:52

## 2022-08-07 RX ADMIN — SODIUM CHLORIDE 2 G: 1 TABLET ORAL at 08:04

## 2022-08-07 RX ADMIN — VASOPRESSIN 100 ML/HR: 20 INJECTION, SOLUTION INTRAVENOUS at 00:04

## 2022-08-07 RX ADMIN — SENNOSIDES AND DOCUSATE SODIUM 2 TABLET: 50; 8.6 TABLET ORAL at 20:26

## 2022-08-07 RX ADMIN — OXYCODONE HYDROCHLORIDE AND ACETAMINOPHEN 1 TABLET: 7.5; 325 TABLET ORAL at 08:03

## 2022-08-07 RX ADMIN — ACETAMINOPHEN 325MG 650 MG: 325 TABLET ORAL at 14:29

## 2022-08-07 RX ADMIN — NIMODIPINE 60 MG: 30 CAPSULE, LIQUID FILLED ORAL at 22:31

## 2022-08-07 RX ADMIN — BUSPIRONE HYDROCHLORIDE 10 MG: 10 TABLET ORAL at 20:25

## 2022-08-07 RX ADMIN — BUSPIRONE HYDROCHLORIDE 10 MG: 10 TABLET ORAL at 12:08

## 2022-08-07 RX ADMIN — NIMODIPINE 60 MG: 30 CAPSULE, LIQUID FILLED ORAL at 14:29

## 2022-08-07 RX ADMIN — TICAGRELOR 90 MG: 90 TABLET ORAL at 08:03

## 2022-08-07 RX ADMIN — VASOPRESSIN 100 ML/HR: 20 INJECTION, SOLUTION INTRAVENOUS at 06:19

## 2022-08-07 RX ADMIN — NIMODIPINE 60 MG: 30 CAPSULE, LIQUID FILLED ORAL at 18:07

## 2022-08-07 RX ADMIN — LEVETIRACETAM 500 MG: 500 TABLET, FILM COATED ORAL at 20:25

## 2022-08-07 RX ADMIN — TICAGRELOR 90 MG: 90 TABLET ORAL at 20:25

## 2022-08-07 RX ADMIN — PANTOPRAZOLE SODIUM 40 MG: 40 TABLET, DELAYED RELEASE ORAL at 07:06

## 2022-08-07 RX ADMIN — OXYCODONE HYDROCHLORIDE AND ACETAMINOPHEN 1 TABLET: 7.5; 325 TABLET ORAL at 20:26

## 2022-08-07 RX ADMIN — NIMODIPINE 60 MG: 30 CAPSULE, LIQUID FILLED ORAL at 07:06

## 2022-08-07 RX ADMIN — VASOPRESSIN 100 ML/HR: 20 INJECTION, SOLUTION INTRAVENOUS at 23:49

## 2022-08-07 RX ADMIN — LISINOPRIL 10 MG: 10 TABLET ORAL at 08:03

## 2022-08-07 RX ADMIN — BUSPIRONE HYDROCHLORIDE 10 MG: 10 TABLET ORAL at 03:26

## 2022-08-07 RX ADMIN — SODIUM CHLORIDE 2 G: 1 TABLET ORAL at 18:09

## 2022-08-07 RX ADMIN — OXYCODONE HYDROCHLORIDE AND ACETAMINOPHEN 1 TABLET: 7.5; 325 TABLET ORAL at 12:08

## 2022-08-07 RX ADMIN — SODIUM CHLORIDE 2 G: 1 TABLET ORAL at 12:08

## 2022-08-07 RX ADMIN — NIMODIPINE 60 MG: 30 CAPSULE, LIQUID FILLED ORAL at 03:14

## 2022-08-07 NOTE — PLAN OF CARE
Goal Outcome Evaluation:  Plan of Care Reviewed With: patient        Progress: no change  Outcome Evaluation: Pt demonstrates decreased activity tolerance, fair balance and decreased functional mobility. Pt c/o discomfort with mobility and ambulation. Pt required use of bathroom for BM, I for pericare. Pt will continue to benefit from skilled acute physical therapy services to increase functional mobility. Recommend home with assist upon DC.

## 2022-08-07 NOTE — PROGRESS NOTES
INTENSIVIST / PULMONARY FOLLOW UP NOTE     Hospital:  LOS: 13 days   Ms. Mary Celis, 46 y.o. female is followed for:     SAH     Iron deficiency anemia due to chronic blood loss    Essential hypertension    Smoker    Class 1 obesity in adult    Illicit drug use (UDS + meth/amphetamines, opiates, and oxycodone)     Hyperglycemia          SUBJECTIVE   No complaints    The patient's relevant past medical, surgical, family, and social history were reviewed    Allergies and medications were reviewed    ROS:    Per subjective, all other systems were reviewed and were negative        OBJECTIVE     Vital Sign Min/Max for last 24 hours:  Temp  Min: 97.6 °F (36.4 °C)  Max: 98.4 °F (36.9 °C)   BP  Min: 114/84  Max: 148/81   Pulse  Min: 50  Max: 68   Resp  Min: 16  Max: 20   SpO2  Min: 95 %  Max: 99 %   No data recorded     Physical Exam:  General Appearance:  No acute distress  Eyes:  No scleral icterus or pallor, pupils normal  Ears, Nose, Mouth, Throat:  Atraumatic, oropharynx clear  Neck:  Trachea midline, thyroid normal  Respiratory:  Clear to auscultation bilaterally, normal effort  Cardiovascular:  Regular rate and rhythm, no murmurs, no peripheral edema  Gastrointestinal:  Soft, non-tender, non-distended, no hepatosplenomegaly  Skin:  Normal temperature, no rash  Psychiatric:  No agitation  Neuro:    Interval:  (shift)  1a. Level of Consciousness: 0-->Alert, keenly responsive  1b. LOC Questions: 0-->Answers both questions correctly  1c. LOC Commands: 0-->Performs both tasks correctly  2. Best Gaze: 0-->Normal  3. Visual: 0-->No visual loss  4. Facial Palsy: 0-->Normal symmetrical movements  5a. Motor Arm, Left: 0-->No drift, limb holds 90 (or 45) degrees for full 10 secs  5b. Motor Arm, Right: 0-->No drift, limb holds 90 (or 45) degrees for full 10 secs  6a. Motor Leg, Left: 0-->No drift, leg holds 30 degree position for full 5 secs  6b. Motor Leg, Right: 0-->No drift, leg holds 30 degree position for full 5 secs  7.  Limb Ataxia: 0-->Absent  8. Sensory: 0-->Normal, no sensory loss  9. Best Language: 0-->No aphasia, normal  10. Dysarthria: 0-->Normal  11. Extinction and Inattention (formerly Neglect): 0-->No abnormality    Total (NIH Stroke Scale): 0      Telemetry:              Hemodynamics:   CVP:     PAP:     PAOP:     CO:     CI:     SVI:     SVR:       SpO2: 96 % SpO2  Min: 95 %  Max: 99 %   Device:      Flow Rate:   No data recorded     Mechanical Ventilator Settings:                                         Intake/Ouptut 24 hrs (7:00AM - 6:59 AM)  Intake & Output (last 3 days)       08/04 0701 08/05 0700 08/05 0701 08/06 0700 08/06 0701 08/07 0700 08/07 0701 08/08 0700    P.O. 3730 1572 1320 350    I.V. (mL/kg) 3638.7 (35.7) 1892.6 (18.6) 1472 (14.4) 1707 (16.7)    Total Intake(mL/kg) 7368.7 (72.2) 3464.6 (34) 2792 (27.4) 2057 (20.2)    Urine (mL/kg/hr) 8075 (3.3) 8100 (3.3) 7430 (3) 285 (0.6)    Stool 0 0 0     Total Output 8075 8100 7430 285    Net -706.3 -4635.4 -4638 +1772            Stool Unmeasured Occurrence 1 x 2 x 1 x           Lines, Drains & Airways     Active LDAs     Name Placement date Placement time Site Days    PICC Triple Lumen 07/30/22 Right Basilic 07/30/22  1138  Basilic  7    Urethral Catheter 07/31/22  1100  -- 6    Arterial Line 07/31/22 Left Radial 07/31/22  1034  created via procedure documentation  Radial  6                Hematology:  Results from last 7 days   Lab Units 08/07/22  0428 08/06/22  0512 08/01/22  0501   WBC 10*3/mm3 17.63* 18.31* 22.15*   HEMOGLOBIN g/dL 9.9* 9.3* 10.4*   HEMATOCRIT % 32.3* 30.1* 33.6*   PLATELETS 10*3/mm3 400 415 437     Electrolytes, Magnesium and Phosphorus:  Results from last 7 days   Lab Units 08/07/22  0428 08/07/22  0003 08/06/22  1126 08/06/22  0512 08/06/22  0023 08/05/22  1158 08/05/22  0608 08/04/22  2329 08/04/22  0530 08/03/22  2358 08/03/22  1210 08/03/22  0505 08/02/22  1208 08/02/22  0540 08/01/22  1304 08/01/22  0501   SODIUM mmol/L 135* 135* 137  138 141 141 140 140  140   < > 140  140   < > 140  140   < > 137   < > 138   CHLORIDE mmol/L 98  --   --  102  --   --   --  105  --  107  --  106  --  104  --  106   POTASSIUM mmol/L 3.8  --   --  4.0  --   --   --  4.6  --  4.2  --  4.2  --  4.2  --  4.6   CO2 mmol/L 31.0*  --   --  28.0  --   --   --  27.0  --  23.0  --  25.0  --  24.0  --  26.0   MAGNESIUM mg/dL 1.8  --   --  1.6  --   --   --  1.7  --  1.8  --  1.7  --  1.8  --  2.1   PHOSPHORUS mg/dL 4.6*  --   --   --   --   --   --   --   --   --   --   --   --   --   --  3.2    < > = values in this interval not displayed.     Renal:  Results from last 7 days   Lab Units 08/07/22  0428 08/06/22  0512 08/04/22  2329 08/03/22  2358 08/03/22  0505 08/02/22  0540 08/01/22  0501   CREATININE mg/dL 0.45* 0.53* 0.40* 0.56* 0.48* 0.57 0.49*   BUN mg/dL 16 17 13 14 13 13 15     Estimated Creatinine Clearance: 191.4 mL/min (A) (by C-G formula based on SCr of 0.45 mg/dL (L)).  Hepatic:  Results from last 7 days   Lab Units 08/07/22  0428   ALK PHOS U/L 59   BILIRUBIN mg/dL 0.3   ALT (SGPT) U/L 16   AST (SGOT) U/L 15     Arterial Blood Gases:              No results found for: LACTATE    Relevant imaging studies and labs from 08/07/22 were reviewed    Medications (drips):  niCARdipine, Last Rate: Stopped (07/31/22 1439)  sodium chloride 1.5% (HYPERTONIC) 500 mL infusion, Last Rate: 100 mL/hr (08/07/22 0619)        acetaminophen, 325 mg, Oral, Daily  aspirin, 81 mg, Oral, Daily  insulin lispro, 0-7 Units, Subcutaneous, TID AC  levETIRAcetam, 500 mg, Oral, BID  lisinopril, 10 mg, Oral, Q24H  nicotine, 1 patch, Transdermal, Q24H  niMODipine, 60 mg, Oral, Q4H  pantoprazole, 40 mg, Oral, Q AM  polyethylene glycol, 17 g, Oral, Daily   And  senna-docusate sodium, 2 tablet, Oral, BID  sodium chloride, 2 g, Oral, TID With Meals  ticagrelor, 90 mg, Oral, BID        acetaminophen  •  senna-docusate sodium **AND** polyethylene glycol **AND** bisacodyl **AND** bisacodyl  •   busPIRone  •  dextrose  •  dextrose  •  enalaprilat  •  glucagon (human recombinant)  •  hydrALAZINE  •  HYDROmorphone  •  labetalol  •  magnesium sulfate **OR** magnesium sulfate **OR** magnesium sulfate  •  ondansetron  •  oxyCODONE-acetaminophen  •  potassium chloride **OR** potassium chloride **OR** potassium chloride  •  potassium phosphate infusion greater than 15 mMoles **OR** potassium phosphate infusion greater than 15 mMoles **OR** potassium phosphate **OR** sodium phosphate IVPB **OR** sodium phosphate IVPB **OR** sodium phosphate IVPB  •  simethicone    Assessment & Plan   IMPRESSION / PLAN     Inpatient Problem List:  46 y.o.female:  Active Hospital Problems    Diagnosis    • **SAH     • Hyperglycemia    • Smoker    • Class 1 obesity in adult    • Illicit drug use (UDS + meth/amphetamines, opiates, and oxycodone)     • Essential hypertension    • Iron deficiency anemia due to chronic blood loss         Hospital Course:  Ms. Cleis is a 47yo F who presented to [facility] for headache. Imaging revealed a large subarachnoid hemorrhage with intraventricular involvement but no evidence of aneurysm and was transferred to [facility] on 7/25/22 for neurosurgical evaluation. Cerebral angiogram on 7/26 was negative for AVM or aneurysm. UDS was positive for methamphetamines and opiates. She was started on Nimotop, Keppra and daily TCD. 3% Saline was started.      She was taken back to the cath lab by Dr. 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      TCDs show improving velocities.       LUE duplex from 8/2/22 shows a superficial thrombophlebitis.    Impression:  TCDs improving.  Wants to go home.    Plan:    SAH  Per NS.  She was changed from 3% to 1.5% saline on 8/6/22, remains on ASA / Brilinta, 21 days of Nimotop.  Day#15, exiting vasospasm window.  Steroids / keppra also per NS.    Anemia  Leukocytosis  monitor    Hypomagnesemia  Replace prn    Hyperglycemia  Improved, no longer  on steroids, d/cd levemir, continue correction scale    DVT Prophylaxis  SCDs    D/c Mauricio    Ok for tele from my standpoint if ok with Neuro    Nutrition  Dietary Orders (From admission, onward)     Start     Ordered    08/06/22 1408  DIET MESSAGE Please send pepperoni pizza and salad with ranch for dinner. Thank you!  Once        Comments: Please send pepperoni pizza and salad with ranch for dinner. Thank you!    08/06/22 1407    07/31/22 1430  Diet Regular  Diet Effective Now        Question:  Diet Texture / Consistency  Answer:  Regular    07/31/22 1430              Plan of care and goals reviewed with multidisciplinary team at daily rounds         Ethan Burrows MD  Intensive Care Medicine  08/07/22 11:48 EDT

## 2022-08-07 NOTE — PROGRESS NOTES
"NAME: PRATIK MENEZES  : 1975  PCP: System, Provider Not In  ADMITTING PHYSICIAN: Ethan Burrows*    DATE OF ADMISSION:  2022  DATE OF SERVICE: 2022    HOSPITAL DAY:  13 days, SAH day #15    HISTORY OF PRESENT ILLNESS:  46 y.o. female who experienced acute onset of severe headache on the evening of 2022.  She was transferred to [facility] on 2022 and underwent diagnostic angiography on 2022 which failed to demonstrate any aneurysm or \"culprit\" lesion to account for her subarachnoid hemorrhage.  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  The aneurysm was treated with flow diverter therapy on 2022, with subsequent follow-up CT angiogram on 2022 demonstrating occlusion of the aneurysm and preservation of flow in parent vasculature.    REVIEW OF IMAGING:  CT head on the morning of 2022 demonstrates resolved subarachnoid hemorrhage.  No new hemorrhages or hydrocephalus.  No areas of infarct identified.  No acute abnormalities.    LABS:  Lab Results   Component Value Date    WBC 17.63 (H) 2022    HGB 9.9 (L) 2022    HCT 32.3 (L) 2022    MCV 81.8 2022     2022     Lab Results   Component Value Date    GLUCOSE 136 (H) 2022    CALCIUM 8.4 (L) 2022     (L) 2022    K 3.8 2022    CO2 31.0 (H) 2022    CL 98 2022    BUN 16 2022    CREATININE 0.45 (L) 2022    EGFRIFAFRI >60 2019    EGFRIFNONA 110 2017    BCR 35.6 (H) 2022    ANIONGAP 6.0 2022       CURRENT MEDS:  Current Facility-Administered Medications   Medication Dose Route Frequency Provider Last Rate Last Admin   • acetaminophen (TYLENOL) tablet 325 mg  325 mg Oral Daily Duran Willis MD   325 mg at 22 0819   • acetaminophen (TYLENOL) tablet 650 mg  650 mg Oral Q4H PRN Duran Willis MD   650 mg at 22 0356   • aspirin " chewable tablet 81 mg  81 mg Oral Daily Duran Willis MD   81 mg at 08/06/22 0818   • polyethylene glycol (MIRALAX) packet 17 g  17 g Oral Daily Case, Mary V., DO   17 g at 08/06/22 0818    And   • sennosides-docusate (PERICOLACE) 8.6-50 MG per tablet 2 tablet  2 tablet Oral BID Case, Mary V., DO   2 tablet at 08/06/22 2005    And   • bisacodyl (DULCOLAX) EC tablet 5 mg  5 mg Oral Daily PRN Case, Mary V., DO   5 mg at 08/05/22 0831    And   • bisacodyl (DULCOLAX) suppository 10 mg  10 mg Rectal Daily PRN Case, Mary V., DO   10 mg at 08/05/22 1220   • busPIRone (BUSPAR) tablet 10 mg  10 mg Oral TID PRN Case, Mary V., DO   10 mg at 08/07/22 0326   • dextrose (D50W) (25 g/50 mL) IV injection 25 g  25 g Intravenous Q15 Min PRN Laisha Siddiqui PA-C       • dextrose (GLUTOSE) oral gel 15 g  15 g Oral Q15 Min PRN Laisha Siddiqui PA-C       • enalaprilat (VASOTEC) injection 1.25 mg  1.25 mg Intravenous Q6H PRN Duran Willis MD   1.25 mg at 07/29/22 2112   • glucagon (human recombinant) (GLUCAGEN DIAGNOSTIC) injection 1 mg  1 mg Intramuscular Q15 Min PRN Laisha Siddiqui PA-C       • hydrALAZINE (APRESOLINE) injection 10 mg  10 mg Intravenous Q4H PRN Duran Willis MD   10 mg at 07/30/22 0040   • HYDROmorphone (DILAUDID) injection 1 mg  1 mg Intravenous Q4H PRN Duran Willis MD   1 mg at 08/07/22 0452   • Insulin Lispro (humaLOG) injection 0-7 Units  0-7 Units Subcutaneous TID AC Laisha Siddiqui PA-C   2 Units at 08/04/22 1716   • labetalol (NORMODYNE,TRANDATE) injection 10 mg  10 mg Intravenous Q10 Min PRN Duran Willis MD       • levETIRAcetam (KEPPRA) tablet 500 mg  500 mg Oral BID Duran Willis MD   500 mg at 08/06/22 2005   • lisinopril (PRINIVIL,ZESTRIL) tablet 10 mg  10 mg Oral Q24H Case, Mary V., DO   10 mg at 08/06/22 0819   • Magnesium Sulfate 2 gram Bolus, followed by 8 gram infusion (total Mg dose 10 grams)- Mg less than  or equal to 1mg/dL  2 g Intravenous PRN Ethan Burrows MD        Or   • Magnesium Sulfate 2 gram / 50mL Infusion (GIVE X 3 BAGS TO EQUAL 6GM TOTAL DOSE) - Mg 1.1 - 1.5 mg/dl  2 g Intravenous PRN Ethan Burrows MD        Or   • Magnesium Sulfate 4 gram infusion- Mg 1.6-1.9 mg/dL  4 g Intravenous PRN Ethan Burrows MD       • niCARdipine (CARDENE) 20 mg in 200 mL NS infusion  5-15 mg/hr Intravenous Titrated Duran Willis MD   Held at 07/31/22 1439   • nicotine (NICODERM CQ) 21 MG/24HR patch 1 patch  1 patch Transdermal Q24H Laisha Siddiqui PA-C   1 patch at 08/06/22 0820   • niMODipine (NIMOTOP) capsule 60 mg  60 mg Oral Q4H Duran Willis MD   60 mg at 08/07/22 0706   • ondansetron (ZOFRAN) injection 4 mg  4 mg Intravenous Q6H PRN Durna Willis MD   4 mg at 08/01/22 1223   • oxyCODONE-acetaminophen (PERCOCET) 7.5-325 MG per tablet 1 tablet  1 tablet Oral Q4H PRN Duran Willis MD   1 tablet at 08/07/22 0326   • pantoprazole (PROTONIX) EC tablet 40 mg  40 mg Oral Q AM Case, Mary V., DO   40 mg at 08/07/22 0706   • potassium chloride (MICRO-K) CR capsule 40 mEq  40 mEq Oral PRN Ethan Burrows MD        Or   • potassium chloride (KLOR-CON) packet 40 mEq  40 mEq Oral PRN Ethan Burrows MD        Or   • potassium chloride 10 mEq in 100 mL IVPB  10 mEq Intravenous Q1H PRN Ethan Burrows MD       • potassium phosphate 45 mmol in sodium chloride 0.9 % 500 mL infusion  45 mmol Intravenous PRN Ethan Burrows MD        Or   • potassium phosphate 30 mmol in sodium chloride 0.9 % 250 mL infusion  30 mmol Intravenous PRN Ethan Burrows MD        Or   • potassium phosphate 15 mmol in 0.9% sodium chloride 100 mL IVPB  15 mmol Intravenous PRN Ethan Burrows MD        Or   • sodium phosphates 45 mmol in sodium chloride 0.9 % 250 mL IVPB  45 mmol Intravenous PRN  Ethan Burrows MD        Or   • sodium phosphates 30 mmol in sodium chloride 0.9 % 250 mL IVPB  30 mmol Intravenous PRN Ethan Burrows MD        Or   • sodium phosphates 15 mmol in sodium chloride 0.9 % 250 mL IVPB  15 mmol Intravenous PRN Ethan Burrows MD       • simethicone (MYLICON) chewable tablet 80 mg  80 mg Oral 4x Daily PRN Case, Mary V., DO   80 mg at 08/04/22 1947   • sodium chloride 1.5% (HYPERTONIC) 500 mL infusion  100 mL/hr Intravenous Continuous Given, Spencer GARCIA  mL/hr at 08/07/22 0619 100 mL/hr at 08/07/22 0619   • sodium chloride tablet 2 g  2 g Oral TID With Meals Laisha Siddiqui PA-C   2 g at 08/06/22 1731   • ticagrelor (BRILINTA) tablet 90 mg  90 mg Oral BID Duran Willis MD   90 mg at 08/06/22 2005       PHYSICAL EXAM:  Vitals:    08/07/22 0700   BP: 131/72   Pulse: 51   Resp:    Temp:    SpO2: 95%      Resting comfortably in bed, eating breakfast.  Headache was a little worse last night, but is subsided this morning.  No facial droop or pronator drift.  Symmetric strength in the extremities.  I do not appreciate any gross visual field deficits.  Speech is clear.    ASSESSMENT/PLAN:  Ms. Celis is a 46 y.o. 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  She is now SAH day #15, and her TCD's have been trending down over the past couple of days or so.  I suspect that she is effectively exiting the vasospasm window, and remains neurologically intact. Her sodium level is holding steady on 1.5% on normal saline, and we will continue this today, hopefully transitioning her to normal saline tomorrow.      Encourage patient to be out of bed today.    She will need to remain on Brilinta/aspirin dual antiplatelet regimen for the next several months or so, and will need to complete a full 21-day course of Nimotop therapy.      Copied text in this note has been  reviewed and is accurate as of 08/07/22.

## 2022-08-08 ENCOUNTER — APPOINTMENT (OUTPATIENT)
Dept: CARDIOLOGY | Facility: HOSPITAL | Age: 47
End: 2022-08-08

## 2022-08-08 LAB
ANION GAP SERPL CALCULATED.3IONS-SCNC: 9 MMOL/L (ref 5–15)
BACTERIA UR QL AUTO: ABNORMAL /HPF
BH CV VAS TCD LEFT ACA: 38 CM/SEC
BH CV VAS TCD LEFT DISTAL M1: 135 CM/SEC
BH CV VAS TCD LEFT MID M1: 118 CM/SEC
BH CV VAS TCD LEFT P1: 30 CM/SEC
BH CV VAS TCD LEFT P2: 49 CM/SEC
BH CV VAS TCD LEFT PROXIMAL M1: 82 CM/SEC
BH CV VAS TCD LEFT TERMINAL ICA: 25 CM/SEC
BH CV VAS TCD RIGHT ACA: 79 CM/SEC
BH CV VAS TCD RIGHT DISTAL M1: 69 CM/SEC
BH CV VAS TCD RIGHT MID M1: 54 CM/SEC
BH CV VAS TCD RIGHT P1: 15 CM/SEC
BH CV VAS TCD RIGHT P2: 37 CM/SEC
BH CV VAS TCD RIGHT PROXIMAL M1: 154 CM/SEC
BH CV VAS TCD RIGHT TERMINAL ICA: 26 CM/SEC
BILIRUB UR QL STRIP: NEGATIVE
BUN SERPL-MCNC: 14 MG/DL (ref 6–20)
BUN/CREAT SERPL: 25.9 (ref 7–25)
CALCIUM SPEC-SCNC: 8.4 MG/DL (ref 8.6–10.5)
CHLORIDE SERPL-SCNC: 96 MMOL/L (ref 98–107)
CLARITY UR: ABNORMAL
CO2 SERPL-SCNC: 28 MMOL/L (ref 22–29)
COLOR UR: YELLOW
CREAT SERPL-MCNC: 0.54 MG/DL (ref 0.57–1)
EGFRCR SERPLBLD CKD-EPI 2021: 115.2 ML/MIN/1.73
GLUCOSE BLDC GLUCOMTR-MCNC: 112 MG/DL (ref 70–130)
GLUCOSE BLDC GLUCOMTR-MCNC: 124 MG/DL (ref 70–130)
GLUCOSE BLDC GLUCOMTR-MCNC: 152 MG/DL (ref 70–130)
GLUCOSE BLDC GLUCOMTR-MCNC: 158 MG/DL (ref 70–130)
GLUCOSE SERPL-MCNC: 108 MG/DL (ref 65–99)
GLUCOSE UR STRIP-MCNC: NEGATIVE MG/DL
HGB UR QL STRIP.AUTO: ABNORMAL
HYALINE CASTS UR QL AUTO: ABNORMAL /LPF
KETONES UR QL STRIP: NEGATIVE
LEUKOCYTE ESTERASE UR QL STRIP.AUTO: ABNORMAL
MAGNESIUM SERPL-MCNC: 2 MG/DL (ref 1.6–2.6)
MAXIMAL PREDICTED HEART RATE: 174 BPM
NITRITE UR QL STRIP: NEGATIVE
PH UR STRIP.AUTO: 7 [PH] (ref 5–8)
PHOSPHATE SERPL-MCNC: 4.5 MG/DL (ref 2.5–4.5)
POTASSIUM SERPL-SCNC: 4.1 MMOL/L (ref 3.5–5.2)
PROT UR QL STRIP: NEGATIVE
RBC # UR STRIP: ABNORMAL /HPF
REF LAB TEST METHOD: ABNORMAL
SODIUM SERPL-SCNC: 133 MMOL/L (ref 136–145)
SODIUM SERPL-SCNC: 142 MMOL/L (ref 136–145)
SP GR UR STRIP: 1.02 (ref 1–1.03)
SQUAMOUS #/AREA URNS HPF: ABNORMAL /HPF
STRESS TARGET HR: 148 BPM
UROBILINOGEN UR QL STRIP: ABNORMAL
WBC # UR STRIP: ABNORMAL /HPF

## 2022-08-08 PROCEDURE — 82962 GLUCOSE BLOOD TEST: CPT

## 2022-08-08 PROCEDURE — 83735 ASSAY OF MAGNESIUM: CPT | Performed by: NEUROLOGICAL SURGERY

## 2022-08-08 PROCEDURE — 93886 INTRACRANIAL COMPLETE STUDY: CPT

## 2022-08-08 PROCEDURE — 84295 ASSAY OF SERUM SODIUM: CPT | Performed by: NURSE PRACTITIONER

## 2022-08-08 PROCEDURE — 63710000001 INSULIN LISPRO (HUMAN) PER 5 UNITS: Performed by: PHYSICIAN ASSISTANT

## 2022-08-08 PROCEDURE — 99232 SBSQ HOSP IP/OBS MODERATE 35: CPT | Performed by: INTERNAL MEDICINE

## 2022-08-08 PROCEDURE — 84100 ASSAY OF PHOSPHORUS: CPT | Performed by: INTERNAL MEDICINE

## 2022-08-08 PROCEDURE — 25010000002 CEFTRIAXONE PER 250 MG: Performed by: NURSE PRACTITIONER

## 2022-08-08 PROCEDURE — 97530 THERAPEUTIC ACTIVITIES: CPT

## 2022-08-08 PROCEDURE — 99232 SBSQ HOSP IP/OBS MODERATE 35: CPT | Performed by: STUDENT IN AN ORGANIZED HEALTH CARE EDUCATION/TRAINING PROGRAM

## 2022-08-08 PROCEDURE — 80048 BASIC METABOLIC PNL TOTAL CA: CPT | Performed by: NEUROLOGICAL SURGERY

## 2022-08-08 RX ORDER — CLONAZEPAM 0.5 MG/1
0.5 TABLET ORAL 2 TIMES DAILY PRN
Status: DISCONTINUED | OUTPATIENT
Start: 2022-08-08 | End: 2022-08-11 | Stop reason: HOSPADM

## 2022-08-08 RX ORDER — FLUDROCORTISONE ACETATE 0.1 MG/1
100 TABLET ORAL EVERY 12 HOURS SCHEDULED
Status: DISCONTINUED | OUTPATIENT
Start: 2022-08-08 | End: 2022-08-11 | Stop reason: HOSPADM

## 2022-08-08 RX ADMIN — NIMODIPINE 60 MG: 30 CAPSULE, LIQUID FILLED ORAL at 17:34

## 2022-08-08 RX ADMIN — ACETAMINOPHEN 325MG 325 MG: 325 TABLET ORAL at 08:14

## 2022-08-08 RX ADMIN — TICAGRELOR 90 MG: 90 TABLET ORAL at 08:14

## 2022-08-08 RX ADMIN — TICAGRELOR 90 MG: 90 TABLET ORAL at 20:51

## 2022-08-08 RX ADMIN — SENNOSIDES AND DOCUSATE SODIUM 2 TABLET: 50; 8.6 TABLET ORAL at 20:51

## 2022-08-08 RX ADMIN — OXYCODONE HYDROCHLORIDE AND ACETAMINOPHEN 1 TABLET: 7.5; 325 TABLET ORAL at 20:51

## 2022-08-08 RX ADMIN — NIMODIPINE 60 MG: 30 CAPSULE, LIQUID FILLED ORAL at 02:25

## 2022-08-08 RX ADMIN — OXYCODONE HYDROCHLORIDE AND ACETAMINOPHEN 1 TABLET: 7.5; 325 TABLET ORAL at 15:26

## 2022-08-08 RX ADMIN — OXYCODONE HYDROCHLORIDE AND ACETAMINOPHEN 1 TABLET: 7.5; 325 TABLET ORAL at 02:28

## 2022-08-08 RX ADMIN — VASOPRESSIN 100 ML/HR: 20 INJECTION, SOLUTION INTRAVENOUS at 18:38

## 2022-08-08 RX ADMIN — INSULIN LISPRO 2 UNITS: 100 INJECTION, SOLUTION INTRAVENOUS; SUBCUTANEOUS at 11:56

## 2022-08-08 RX ADMIN — FLUDROCORTISONE ACETATE 100 MCG: 0.1 TABLET ORAL at 20:51

## 2022-08-08 RX ADMIN — ASPIRIN 81 MG CHEWABLE TABLET 81 MG: 81 TABLET CHEWABLE at 08:13

## 2022-08-08 RX ADMIN — LEVETIRACETAM 500 MG: 500 TABLET, FILM COATED ORAL at 08:14

## 2022-08-08 RX ADMIN — SENNOSIDES AND DOCUSATE SODIUM 2 TABLET: 50; 8.6 TABLET ORAL at 08:14

## 2022-08-08 RX ADMIN — BUSPIRONE HYDROCHLORIDE 10 MG: 10 TABLET ORAL at 08:14

## 2022-08-08 RX ADMIN — NIMODIPINE 60 MG: 30 CAPSULE, LIQUID FILLED ORAL at 23:05

## 2022-08-08 RX ADMIN — VASOPRESSIN 100 ML/HR: 20 INJECTION, SOLUTION INTRAVENOUS at 11:56

## 2022-08-08 RX ADMIN — NIMODIPINE 60 MG: 30 CAPSULE, LIQUID FILLED ORAL at 14:03

## 2022-08-08 RX ADMIN — FLUDROCORTISONE ACETATE 100 MCG: 0.1 TABLET ORAL at 10:34

## 2022-08-08 RX ADMIN — LISINOPRIL 10 MG: 10 TABLET ORAL at 08:14

## 2022-08-08 RX ADMIN — NIMODIPINE 60 MG: 30 CAPSULE, LIQUID FILLED ORAL at 06:14

## 2022-08-08 RX ADMIN — BUSPIRONE HYDROCHLORIDE 10 MG: 10 TABLET ORAL at 20:58

## 2022-08-08 RX ADMIN — POLYETHYLENE GLYCOL 3350 17 G: 17 POWDER, FOR SOLUTION ORAL at 08:13

## 2022-08-08 RX ADMIN — SODIUM CHLORIDE 1 G: 900 INJECTION INTRAVENOUS at 20:51

## 2022-08-08 RX ADMIN — Medication 1 PATCH: at 08:13

## 2022-08-08 RX ADMIN — SODIUM CHLORIDE 2 G: 1 TABLET ORAL at 11:56

## 2022-08-08 RX ADMIN — LEVETIRACETAM 500 MG: 500 TABLET, FILM COATED ORAL at 20:51

## 2022-08-08 RX ADMIN — CLONAZEPAM 0.5 MG: 0.5 TABLET ORAL at 14:03

## 2022-08-08 RX ADMIN — OXYCODONE HYDROCHLORIDE AND ACETAMINOPHEN 1 TABLET: 7.5; 325 TABLET ORAL at 08:14

## 2022-08-08 RX ADMIN — PANTOPRAZOLE SODIUM 40 MG: 40 TABLET, DELAYED RELEASE ORAL at 06:14

## 2022-08-08 RX ADMIN — SODIUM CHLORIDE 2 G: 1 TABLET ORAL at 17:34

## 2022-08-08 RX ADMIN — VASOPRESSIN 100 ML/HR: 20 INJECTION, SOLUTION INTRAVENOUS at 06:10

## 2022-08-08 RX ADMIN — SODIUM CHLORIDE 2 G: 1 TABLET ORAL at 08:13

## 2022-08-08 RX ADMIN — NIMODIPINE 60 MG: 30 CAPSULE, LIQUID FILLED ORAL at 10:02

## 2022-08-08 RX ADMIN — SODIUM CHLORIDE 1 G: 900 INJECTION INTRAVENOUS at 02:28

## 2022-08-08 NOTE — THERAPY TREATMENT NOTE
Patient Name: Mary Celis  : 1975    MRN: [id number]                              Today's Date: 2022       Admit Date: 2022    Visit Dx:     ICD-10-CM ICD-9-CM   1. SAH (subarachnoid hemorrhage) (HCC)  I60.9 430   2. Essential hypertension  I10 401.9   3. Iron malabsorption  K90.9 579.8   4. Right upper quadrant abdominal pain  R10.11 789.01   5. Iron deficiency anemia due to chronic blood loss  D50.0 280.0   6. Acute nonintractable headache, unspecified headache type  R51.9 784.0   7. Cognitive communication deficit  R41.841 799.52     Patient Active Problem List   Diagnosis   • Iron deficiency anemia due to chronic blood loss   • Right upper quadrant abdominal pain   • Iron malabsorption   • SAH    • Essential hypertension   • Smoker   • Class 1 obesity in adult   • Illicit drug use (UDS + meth/amphetamines, opiates, and oxycodone)    • Hyperglycemia     Past Medical History:   Diagnosis Date   • Abnormal Pap smear of cervix    • Anemia    • Arthritis    • Arthritis    • Body piercing     EARS   • Cancer (HCC)     REPORTS PRE-CERVICAL CANCER   • Depression with anxiety    • Gout    • History of blood transfusion    • History of transfusion     REPORTS HAS HAD SEVERAL TRANSFUSIONS, NO REACTIONS   • IBS (irritable bowel syndrome)    • Panic attack    • Tattoo     LEFT FOREARM   • Wears glasses      Past Surgical History:   Procedure Laterality Date   • CEREBRAL ANGIOGRAM N/A 2022    Procedure: Cerebral angiogram;  Surgeon: Spencer Hauser MD;  Location: [facility] CATH INVASIVE LOCATION;  Service: Interventional Radiology;  Laterality: N/A;   • CHOLECYSTECTOMY     • CHOLECYSTECTOMY WITH INTRAOPERATIVE CHOLANGIOGRAM N/A 2017    Procedure: CHOLECYSTECTOMY LAPAROSCOPIC INTRAOPERATIVE CHOLANGIOGRAM;  Surgeon: Albaro Lopez MD;  Location: [facility] OR;  Service:    • COLONOSCOPY N/A 2017    Procedure: COLONOSCOPY;  Surgeon: Albaro Lopez MD;  Location: [facility] ENDOSCOPY;  Service:    • D & C  HYSTEROSCOPY      VITAL SIGNS UNSTABLE WITH THIS PROCEDURE   • ENDOSCOPY N/A 7/28/2017    Procedure: ESOPHAGOGASTRODUODENOSCOPY WITH BIOPSIES;  Surgeon: Albaro Lopze MD;  Location: [facility] ENDOSCOPY;  Service:    • INTERVENTIONAL RADIOLOGY PROCEDURE Bilateral 7/31/2022    Procedure: CAROTID CEREBRAL ANGIOGRAM BILATERAL;  Surgeon: Duran Willis MD;  Location: [facility] INVASIVE LOCATION;  Service: Interventional Radiology;  Laterality: Bilateral;   • TUBAL ABDOMINAL LIGATION     • WISDOM TOOTH EXTRACTION        General Information     Row Name 08/08/22 1327          Physical Therapy Time and Intention    Document Type therapy note (daily note) (P)   -WJ     Mode of Treatment physical therapy (P)   -WJ     Row Name 08/08/22 1327          General Information    Patient Profile Reviewed yes (P)   -WJ     Existing Precautions/Restrictions fall (P)   -WJ     Barriers to Rehab medically complex (P)   -WJ     Row Name 08/08/22 1327          Cognition    Orientation Status (Cognition) oriented x 4 (P)   -WJ     Row Name 08/08/22 1327          Safety Issues, Functional Mobility    Safety Issues Affecting Function (Mobility) safety precaution awareness;awareness of need for assistance (P)   -WJ     Impairments Affecting Function (Mobility) balance;endurance/activity tolerance;pain (P)   -WJ           User Key  (r) = Recorded By, (t) = Taken By, (c) = Cosigned By    Initials Name Provider Type    WJ Bobby Dumas, PT Student PT Student               Mobility     Row Name 08/08/22 1329          Bed Mobility    Bed Mobility supine-sit (P)   -WJ     Supine-Sit Ash (Bed Mobility) contact guard;verbal cues (P)   -WJ     Assistive Device (Bed Mobility) bed rails;head of bed elevated (P)   -WJ     Comment, (Bed Mobility) Pt required increased time to complete task, required cues to intiate movement (P)   -WJ     Row Name 08/08/22 1329          Sit-Stand Transfer    Sit-Stand Ash (Transfers) standby  assist (P)   -WJ     Comment, (Sit-Stand Transfer) Pt w/ good strength and sequencing w/ STS; no cueing needed (P)   -WJ     Row Name 08/08/22 1329          Gait/Stairs (Locomotion)    Hazel Level (Gait) contact guard (P)   -WJ     Assistive Device (Gait) other (see comments) (P)   IV pole  -WJ     Distance in Feet (Gait) 600 (P)   -WJ     Deviations/Abnormal Patterns (Gait) bilateral deviations;base of support, wide;stride length decreased;benja decreased (P)   -WJ     Bilateral Gait Deviations forward flexed posture (P)   -WJ     Hazel Level (Stairs) not tested (P)   -WJ     Comment, (Gait/Stairs) Pt ambulated w/ step through gait pattern, reduced gait speed; Pt demonstrated good stability during ambulation and minimal fatigue. (P)   -WJ           User Key  (r) = Recorded By, (t) = Taken By, (c) = Cosigned By    Initials Name Provider Type    Bobby Saldivar PT Student PT Student               Obj/Interventions     Row Name 08/08/22 1331          Balance    Balance Assessment sitting static balance;sitting dynamic balance;sit to stand dynamic balance;standing static balance;standing dynamic balance (P)   -WJ     Static Sitting Balance independent (P)   -WJ     Dynamic Sitting Balance independent (P)   -WJ     Position, Sitting Balance unsupported;sitting edge of bed (P)   -WJ     Sit to Stand Dynamic Balance standby assist;verbal cues (P)   -WJ     Static Standing Balance standby assist (P)   -WJ     Dynamic Standing Balance standby assist;verbal cues;other (see comments) (P)   IV pole  -WJ     Position/Device Used, Standing Balance unsupported (P)   -WJ     Balance Interventions sitting;standing;sit to stand;static;dynamic;minimal challenge (P)   -WJ     Comment, Balance No LOB (P)   -WJ           User Key  (r) = Recorded By, (t) = Taken By, (c) = Cosigned By    Initials Name Provider Type    Bobby Saldivar PT Student PT Student               Goals/Plan    No documentation.                 Clinical Impression     Row Name 08/08/22 1332          Pain    Pretreatment Pain Rating 6/10 (P)   -WJ     Posttreatment Pain Rating 6/10 (P)   -WJ     Pain Location generalized (P)   -WJ     Pain Location - abdomen (P)   -WJ     Pain Intervention(s) Ambulation/increased activity;Repositioned;Nursing Notified;Rest (P)   -WJ     Row Name 08/08/22 1332          Plan of Care Review    Plan of Care Reviewed With patient (P)   -WJ     Progress improving (P)   -WJ     Outcome Evaluation Pt w/ less anxiety and pleasant, very cooperative w/ PT. Pt w/ improvements; pt ambulated 600 ft SBA w/ R UE holding IV pole. Pt needed min cueing for upright posture. Pt w/ min fatigue at conclusion of treatment. Pt will continue to benefit from IPPT. PT continue to rec d/c home w/ assist pending medical status. (P)   -WJ     Row Name 08/08/22 1332          Therapy Assessment/Plan (PT)    Patient/Family Therapy Goals Statement (PT) To get stronger (P)   -WJ     Rehab Potential (PT) good, to achieve stated therapy goals (P)   -WJ     Criteria for Skilled Interventions Met (PT) yes;meets criteria;skilled treatment is necessary (P)   -WJ     Therapy Frequency (PT) daily (P)   -WJ     Row Name 08/08/22 1332          Vital Signs    Pre Systolic BP Rehab 109 (P)   -WJ     Pre Treatment Diastolic BP 62 (P)   -WJ     Post Systolic BP Rehab 116 (P)   -WJ     Post Treatment Diastolic BP 68 (P)   -WJ     Pretreatment Heart Rate (beats/min) 63 (P)   -WJ     Posttreatment Heart Rate (beats/min) 65 (P)   -WJ     Pre SpO2 (%) 96 (P)   -WJ     O2 Delivery Pre Treatment room air (P)   -WJ     O2 Delivery Intra Treatment room air (P)   -WJ     Post SpO2 (%) 96 (P)   -WJ     O2 Delivery Post Treatment room air (P)   -WJ     Pre Patient Position Supine (P)   -WJ     Intra Patient Position Standing (P)   -WJ     Post Patient Position Sitting (P)   -WJ     Row Name 08/08/22 1332          Positioning and Restraints    Pre-Treatment Position in bed (P)   -WJ      Post Treatment Position chair (P)   -WJ     In Chair notified nsg;reclined;sitting;call light within reach;encouraged to call for assist;exit alarm on;waffle cushion;legs elevated;heels elevated (P)   -WJ           User Key  (r) = Recorded By, (t) = Taken By, (c) = Cosigned By    Initials Name Provider Type    Bobby Saldivar, PT Student PT Student               Outcome Measures     Row Name 08/08/22 1336          How much help from another person do you currently need...    Turning from your back to your side while in flat bed without using bedrails? 4 (P)   -WJ     Moving from lying on back to sitting on the side of a flat bed without bedrails? 4 (P)   -WJ     Moving to and from a bed to a chair (including a wheelchair)? 4 (P)   -WJ     Standing up from a chair using your arms (e.g., wheelchair, bedside chair)? 4 (P)   -WJ     Climbing 3-5 steps with a railing? 3 (P)   -WJ     To walk in hospital room? 3 (P)   -WJ     AM-PAC 6 Clicks Score (PT) 22 (P)   -WJ     Highest level of mobility 7 --> Walked 25 feet or more (P)   -WJ     Row Name 08/08/22 1336          Functional Assessment    Outcome Measure Options AM-PAC 6 Clicks Basic Mobility (PT) (P)   -WJ           User Key  (r) = Recorded By, (t) = Taken By, (c) = Cosigned By    Initials Name Provider Type    Bobby Saldivar PT Student PT Student                             Physical Therapy Education                 Title: PT OT SLP Therapies (In Progress)     Topic: Physical Therapy (Done)     Point: Mobility training (Done)     Learning Progress Summary           Patient Acceptance, E, VU,DU by BILLY at 8/8/2022 1336      Show all documentation for this point (6)                 Point: Home exercise program (Done)     Learning Progress Summary           Patient Acceptance, E, VU,DU by BILLY at 8/8/2022 1336      Show all documentation for this point (1)                 Point: Body mechanics (Done)     Learning Progress Summary           Patient Acceptance, E,  IRAIDA MINOR by  at 8/8/2022 1336      Show all documentation for this point (6)                 Point: Precautions (Done)     Learning Progress Summary           Patient Ana Laura, MILY PACHECO DU by  at 8/8/2022 1336      Show all documentation for this point (6)                             User Key     Initials Effective Dates Name Provider Type Discipline     05/31/22 -  Bobby Dumas, PT Student PT Student PT              PT Recommendation and Plan     Plan of Care Reviewed With: (P) patient  Progress: (P) improving  Outcome Evaluation: (P) Pt w/ less anxiety and pleasant, very cooperative w/ PT. Pt w/ improvements; pt ambulated 600 ft SBA w/ R UE holding IV pole. Pt needed min cueing for upright posture. Pt w/ min fatigue at conclusion of treatment. Pt will continue to benefit from IPPT. PT continue to rec d/c home w/ assist pending medical status.     Time Calculation:    PT Charges     Row Name 08/08/22 1337             Time Calculation    Start Time 1038 (P)   -WJ      PT Received On 08/08/22 (P)   -WJ      PT Goal Re-Cert Due Date 08/17/22 (P)   -WJ              Time Calculation- PT    Total Timed Code Minutes- PT 23 minute(s) (P)   -WJ              Timed Charges    81935 - PT Therapeutic Activity Minutes 23 (P)   -WJ              Total Minutes    Timed Charges Total Minutes 23 (P)   -WJ       Total Minutes 23 (P)   -WJ            User Key  (r) = Recorded By, (t) = Taken By, (c) = Cosigned By    Initials Name Provider Type     Bobby Dumas PT Student PT Student              Therapy Charges for Today     Code Description Service Date Service Provider Modifiers Qty    [id number]  PT THERAPEUTIC ACT EA 15 MIN 8/8/2022 Bobby Dumas, PT Student GP 2          PT G-Codes  Outcome Measure Options: (P) AM-PAC 6 Clicks Basic Mobility (PT)  AM-PAC 6 Clicks Score (PT): (P) 22  AM-PAC 6 Clicks Score (OT): 20  Modified [place] Scale: 1 - No significant disability despite symptoms.  Able to carry out all usual  duties and activities.    MIL STANLEY, PT Student  8/8/2022

## 2022-08-08 NOTE — PLAN OF CARE
Goal Outcome Evaluation:              Outcome Evaluation: NIHSS 0, Headache continues with PRN's only giving slight relief.  Pt c/o burning with urination.  UA sent and Rocephine started.  Pt frustrated and tearful at times.  1.5% Saline running at 100. Morning .

## 2022-08-08 NOTE — PROGRESS NOTES
"NEUROSURGERY PROGRESS NOTE    Chief Complaint: Subarachnoid hemorrhage     Subjective: neuro stable overnight.  Sodium has dropped to 133.    Objective    Vital Signs: Blood pressure 134/70, pulse 60, temperature 98.5 °F (36.9 °C), temperature source Oral, resp. rate 15, height 170 cm (66.93\"), weight 102 kg (225 lb), last menstrual period 05/08/2018, SpO2 95 %, not currently breastfeeding.    Physical Exam  Awake, alert and oriented x 3  Opens eyes spont  Pupils 3 mm rx bilat  Extraocular muscles intact bilaterally  Face symmetric bilaterally  Tongue midline  5/5 in all 4 ext  No pronator drift    Intake/Output:     Intake/Output Summary (Last 24 hours) at 8/8/2022 0852  Last data filed at 8/8/2022 0610  Gross per 24 hour   Intake 2257.5 ml   Output 3815 ml   Net -1557.5 ml       Current Medications:   Current Facility-Administered Medications:   •  acetaminophen (TYLENOL) tablet 325 mg, 325 mg, Oral, Daily, Duran Willis MD, 325 mg at 08/08/22 0814  •  acetaminophen (TYLENOL) tablet 650 mg, 650 mg, Oral, Q4H PRN, Duran Willis MD, 650 mg at 08/07/22 1429  •  aspirin chewable tablet 81 mg, 81 mg, Oral, Daily, Duran Willis MD, 81 mg at 08/08/22 0813  •  sennosides-docusate (PERICOLACE) 8.6-50 MG per tablet 2 tablet, 2 tablet, Oral, BID, 2 tablet at 08/08/22 0814 **AND** polyethylene glycol (MIRALAX) packet 17 g, 17 g, Oral, Daily, 17 g at 08/08/22 0813 **AND** bisacodyl (DULCOLAX) EC tablet 5 mg, 5 mg, Oral, Daily PRN, 5 mg at 08/05/22 0831 **AND** bisacodyl (DULCOLAX) suppository 10 mg, 10 mg, Rectal, Daily PRN, Case, Mary V., DO, 10 mg at 08/05/22 1220  •  busPIRone (BUSPAR) tablet 10 mg, 10 mg, Oral, TID PRN, Case, Mary V., DO, 10 mg at 08/08/22 0814  •  cefTRIAXone (ROCEPHIN) 1 g/100 mL 0.9% NS (MBP), 1 g, Intravenous, Q24H, Flavio Henriquez, APRN, 1 g at 08/08/22 0228  •  dextrose (D50W) (25 g/50 mL) IV injection 25 g, 25 g, Intravenous, Q15 Min PRN, Laisha Siddiqui, " JORGE  •  dextrose (GLUTOSE) oral gel 15 g, 15 g, Oral, Q15 Min PRN, Laisha Siddiqui PA-C  •  enalaprilat (VASOTEC) injection 1.25 mg, 1.25 mg, Intravenous, Q6H PRN, Duran Willis MD, 1.25 mg at 07/29/22 2112  •  fludrocortisone tablet 100 mcg, 100 mcg, Oral, Q12H, Brannon Barrientos MD  •  glucagon (human recombinant) (GLUCAGEN DIAGNOSTIC) injection 1 mg, 1 mg, Intramuscular, Q15 Min PRN, Laisha Siddiqui PA-C  •  hydrALAZINE (APRESOLINE) injection 10 mg, 10 mg, Intravenous, Q4H PRN, Duran Willis MD, 10 mg at 07/30/22 0040  •  Insulin Lispro (humaLOG) injection 0-7 Units, 0-7 Units, Subcutaneous, TID AC, Laisha Siddiqui PA-C, 2 Units at 08/04/22 1716  •  labetalol (NORMODYNE,TRANDATE) injection 10 mg, 10 mg, Intravenous, Q10 Min PRN, Duran Willis MD  •  levETIRAcetam (KEPPRA) tablet 500 mg, 500 mg, Oral, BID, Duran Willis MD, 500 mg at 08/08/22 0814  •  lisinopril (PRINIVIL,ZESTRIL) tablet 10 mg, 10 mg, Oral, Q24H, Case, Mary V., DO, 10 mg at 08/08/22 0814  •  Magnesium Sulfate 2 gram Bolus, followed by 8 gram infusion (total Mg dose 10 grams)- Mg less than or equal to 1mg/dL, 2 g, Intravenous, PRN **OR** Magnesium Sulfate 2 gram / 50mL Infusion (GIVE X 3 BAGS TO EQUAL 6GM TOTAL DOSE) - Mg 1.1 - 1.5 mg/dl, 2 g, Intravenous, PRN **OR** Magnesium Sulfate 4 gram infusion- Mg 1.6-1.9 mg/dL, 4 g, Intravenous, PRN, Ethan Burrows MD, Last Rate: 25 mL/hr at 08/07/22 1617, 4 g at 08/07/22 1617  •  niCARdipine (CARDENE) 20 mg in 200 mL NS infusion, 5-15 mg/hr, Intravenous, Titrated, Duran Willis MD, Held at 07/31/22 1439  •  nicotine (NICODERM CQ) 21 MG/24HR patch 1 patch, 1 patch, Transdermal, Q24H, Laisha Siddiqui PA-C, 1 patch at 08/08/22 0813  •  niMODipine (NIMOTOP) capsule 60 mg, 60 mg, Oral, Q4H, Duran Willis MD, 60 mg at 08/08/22 0614  •  ondansetron (ZOFRAN) injection 4 mg, 4 mg, Intravenous, Q6H PRN, Duran Willis  MD Fawad, 4 mg at 08/01/22 1223  •  oxyCODONE-acetaminophen (PERCOCET) 7.5-325 MG per tablet 1 tablet, 1 tablet, Oral, Q4H PRN, Judith Valencia APRN, 1 tablet at 08/08/22 0814  •  pantoprazole (PROTONIX) EC tablet 40 mg, 40 mg, Oral, Q AM, Case, Mary V., DO, 40 mg at 08/08/22 0614  •  potassium chloride (MICRO-K) CR capsule 40 mEq, 40 mEq, Oral, PRN **OR** potassium chloride (KLOR-CON) packet 40 mEq, 40 mEq, Oral, PRN **OR** potassium chloride 10 mEq in 100 mL IVPB, 10 mEq, Intravenous, Q1H PRN, Ethan Burrows MD  •  potassium phosphate 45 mmol in sodium chloride 0.9 % 500 mL infusion, 45 mmol, Intravenous, PRN **OR** potassium phosphate 30 mmol in sodium chloride 0.9 % 250 mL infusion, 30 mmol, Intravenous, PRN **OR** potassium phosphate 15 mmol in 0.9% sodium chloride 100 mL IVPB, 15 mmol, Intravenous, PRN **OR** sodium phosphates 45 mmol in sodium chloride 0.9 % 250 mL IVPB, 45 mmol, Intravenous, PRN **OR** sodium phosphates 30 mmol in sodium chloride 0.9 % 250 mL IVPB, 30 mmol, Intravenous, PRN **OR** sodium phosphates 15 mmol in sodium chloride 0.9 % 250 mL IVPB, 15 mmol, Intravenous, PRN, Ethan Burrows MD  •  simethicone (MYLICON) chewable tablet 80 mg, 80 mg, Oral, 4x Daily PRN, Case, Mary V., DO, 80 mg at 08/04/22 1947  •  sodium chloride 1.5% (HYPERTONIC) 500 mL infusion, 100 mL/hr, Intravenous, Continuous, Given, Spencer GARCIA MD, Last Rate: 100 mL/hr at 08/08/22 0610, 100 mL/hr at 08/08/22 0610  •  sodium chloride tablet 2 g, 2 g, Oral, TID With Meals, Laisha Siddiqui PA-C, 2 g at 08/08/22 0813  •  ticagrelor (BRILINTA) tablet 90 mg, 90 mg, Oral, BID, Duran Willis MD, 90 mg at 08/08/22 0814     Laboratory Results:       Lab 08/07/22  0428 08/06/22  0512   WBC 17.63* 18.31*   HEMOGLOBIN 9.9* 9.3*   HEMATOCRIT 32.3* 30.1*   PLATELETS 400 415   NEUTROS ABS 12.89*  --    IMMATURE GRANS (ABS) 0.46*  --    LYMPHS ABS 2.66  --    MONOS ABS 1.44*  --     EOS ABS 0.14  --    MCV 81.8 81.1         Lab 08/08/22  0611 08/07/22  1816 08/07/22  0428 08/07/22  0003 08/06/22  1126 08/06/22  0512 08/05/22  0608 08/04/22  2329 08/04/22  0530 08/03/22  2358   SODIUM 133* 138 135* 135* 137 138   < > 140  140   < > 140  140   POTASSIUM 4.1  --  3.8  --   --  4.0  --  4.6  --  4.2   CHLORIDE 96*  --  98  --   --  102  --  105  --  107   CO2 28.0  --  31.0*  --   --  28.0  --  27.0  --  23.0   ANION GAP 9.0  --  6.0  --   --  8.0  --  8.0  --  10.0   BUN 14  --  16  --   --  17  --  13  --  14   CREATININE 0.54*  --  0.45*  --   --  0.53*  --  0.40*  --  0.56*   EGFR 115.2  --  120.3  --   --  115.7  --  123.8  --  114.1   GLUCOSE 108*  --  136*  --   --  100*  --  120*  --  233*   CALCIUM 8.4*  --  8.4*  --   --  7.9*  --  8.1*  --  8.1*   MAGNESIUM 2.0  --  1.8  --   --  1.6  --  1.7  --  1.8   PHOSPHORUS 4.5  --  4.6*  --   --   --   --   --   --   --     < > = values in this interval not displayed.         Lab 08/07/22  0428   TOTAL PROTEIN 5.6*   ALBUMIN 3.50   GLOBULIN 2.1   ALT (SGPT) 16   AST (SGOT) 15   BILIRUBIN 0.3   ALK PHOS 59                     Brief Urine Lab Results  (Last result in the past 365 days)      Color   Clarity   Blood   Leuk Est   Nitrite   Protein   CREAT   Urine HCG        08/07/22 2313 Yellow   Cloudy   Small (1+)   Moderate (2+)   Negative   Negative               Microbiology Results (last 10 days)     ** No results found for the last 240 hours. **           Diagnostic Imaging: I reviewed and independently interpreted the new imaging.     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  -Neurologically, patient has remained stable.  TCD''s continue to downtrend.  She is on 1.5% saline.  Sodium dropped to 133 overnight.  I started her on Florinef.  We will look to get her sodium stable and if so, can  discharge.      Any copied data from previous notes included in the (1) History of Present Illness, (2) Physical Examination and (3) Medical Decision Making and/or Assessment and Plan has been been reviewed and is accurate as of 08/08/22      Brannon Barrientos MD  08/08/22  08:52 EDT

## 2022-08-08 NOTE — PROGRESS NOTES
INTENSIVIST / PULMONARY FOLLOW UP NOTE     Hospital:  LOS: 14 days   Ms. Mary Celis, 46 y.o. female is followed for:     SAH     Iron deficiency anemia due to chronic blood loss    Essential hypertension    Smoker    Class 1 obesity in adult    Illicit drug use (UDS + meth/amphetamines, opiates, and oxycodone)     Hyperglycemia          SUBJECTIVE   Complains of anxiety    The patient's relevant past medical, surgical, family, and social history were reviewed    Allergies and medications were reviewed    ROS:    Per subjective, all other systems were reviewed and were negative        OBJECTIVE     Vital Sign Min/Max for last 24 hours:  Temp  Min: 97.7 °F (36.5 °C)  Max: 98.5 °F (36.9 °C)   BP  Min: 107/65  Max: 138/64   Pulse  Min: 57  Max: 74   Resp  Min: 15  Max: 20   SpO2  Min: 95 %  Max: 100 %   No data recorded     Physical Exam:  General Appearance:  No acute distress  Eyes:  No scleral icterus or pallor, pupils normal  Ears, Nose, Mouth, Throat:  Atraumatic, oropharynx clear  Neck:  Trachea midline, thyroid normal  Respiratory:  Clear to auscultation bilaterally, normal effort  Cardiovascular:  Regular rate and rhythm, no murmurs, no peripheral edema  Gastrointestinal:  Soft, non-tender, non-distended, no hepatosplenomegaly  Skin:  Normal temperature, no rash  Psychiatric:  No agitation  Neuro:    Interval:  (Handoff)  1a. Level of Consciousness: 0-->Alert, keenly responsive  1b. LOC Questions: 0-->Answers both questions correctly  1c. LOC Commands: 0-->Performs both tasks correctly  2. Best Gaze: 0-->Normal  3. Visual: 0-->No visual loss  4. Facial Palsy: 0-->Normal symmetrical movements  5a. Motor Arm, Left: 0-->No drift, limb holds 90 (or 45) degrees for full 10 secs  5b. Motor Arm, Right: 0-->No drift, limb holds 90 (or 45) degrees for full 10 secs  6a. Motor Leg, Left: 0-->No drift, leg holds 30 degree position for full 5 secs  6b. Motor Leg, Right: 0-->No drift, leg holds 30 degree position for full 5  secs  7. Limb Ataxia: 0-->Absent  8. Sensory: 0-->Normal, no sensory loss  9. Best Language: 0-->No aphasia, normal  10. Dysarthria: 0-->Normal  11. Extinction and Inattention (formerly Neglect): 0-->No abnormality    Total (NIH Stroke Scale): 0      Telemetry:              Hemodynamics:   CVP:     PAP:     PAOP:     CO:     CI:     SVI:     SVR:       SpO2: 97 % SpO2  Min: 95 %  Max: 100 %   Device:      Flow Rate:   No data recorded     Mechanical Ventilator Settings:                                         Intake/Ouptut 24 hrs (7:00AM - 6:59 AM)  Intake & Output (last 3 days)       08/05 0701 08/06 0700 08/06 0701 08/07 0700 08/07 0701 08/08 0700 08/08 0701 08/09 0700    P.O. 1572 1320 350     I.V. (mL/kg) 1892.6 (18.6) 1472 (14.4) 3864.5 (37.9) 536.6 (5.3)    IV Piggyback   100     Total Intake(mL/kg) 3464.6 (34) 2792 (27.4) 4314.5 (42.3) 536.6 (5.3)    Urine (mL/kg/hr) 8100 (3.3) 7430 (3) 4100 (1.7) 1000 (1.5)    Stool 0 0      Total Output 8100 7430 4100 1000    Net -4635.4 -4638 +214.5 -463.4            Urine Unmeasured Occurrence   101 x     Stool Unmeasured Occurrence 2 x 1 x            Lines, Drains & Airways     Active LDAs     Name Placement date Placement time Site Days    PICC Triple Lumen 07/30/22 Right Basilic 07/30/22  1138  Basilic  7    Urethral Catheter 07/31/22  1100  -- 6    Arterial Line 07/31/22 Left Radial 07/31/22  1034  created via procedure documentation  Radial  6                Hematology:  Results from last 7 days   Lab Units 08/07/22  0428 08/06/22  0512   WBC 10*3/mm3 17.63* 18.31*   HEMOGLOBIN g/dL 9.9* 9.3*   HEMATOCRIT % 32.3* 30.1*   PLATELETS 10*3/mm3 400 415     Electrolytes, Magnesium and Phosphorus:  Results from last 7 days   Lab Units 08/08/22  0611 08/07/22  1816 08/07/22  0428 08/07/22  0003 08/06/22  1126 08/06/22  0512 08/06/22  0023 08/05/22  0608 08/04/22  2329 08/04/22  0530 08/03/22  2358 08/03/22  1210 08/03/22  0505 08/02/22  1208 08/02/22  0540   SODIUM  mmol/L 133* 138 135* 135* 137 138 141   < > 140  140   < > 140  140   < > 140  140   < > 137   CHLORIDE mmol/L 96*  --  98  --   --  102  --   --  105  --  107  --  106  --  104   POTASSIUM mmol/L 4.1  --  3.8  --   --  4.0  --   --  4.6  --  4.2  --  4.2  --  4.2   CO2 mmol/L 28.0  --  31.0*  --   --  28.0  --   --  27.0  --  23.0  --  25.0  --  24.0   MAGNESIUM mg/dL 2.0  --  1.8  --   --  1.6  --   --  1.7  --  1.8  --  1.7  --  1.8   PHOSPHORUS mg/dL 4.5  --  4.6*  --   --   --   --   --   --   --   --   --   --   --   --     < > = values in this interval not displayed.     Renal:  Results from last 7 days   Lab Units 08/08/22  0611 08/07/22  0428 08/06/22  0512 08/04/22  2329 08/03/22  2358 08/03/22  0505 08/02/22  0540   CREATININE mg/dL 0.54* 0.45* 0.53* 0.40* 0.56* 0.48* 0.57   BUN mg/dL 14 16 17 13 14 13 13     Estimated Creatinine Clearance: 159.5 mL/min (A) (by C-G formula based on SCr of 0.54 mg/dL (L)).  Hepatic:  Results from last 7 days   Lab Units 08/07/22  0428   ALK PHOS U/L 59   BILIRUBIN mg/dL 0.3   ALT (SGPT) U/L 16   AST (SGOT) U/L 15     Arterial Blood Gases:              No results found for: LACTATE    Relevant imaging studies and labs from 08/08/22 were reviewed    Medications (drips):  niCARdipine, Last Rate: Stopped (07/31/22 1439)  sodium chloride 1.5% (HYPERTONIC) 500 mL infusion, Last Rate: 100 mL/hr (08/08/22 1156)        acetaminophen, 325 mg, Oral, Daily  aspirin, 81 mg, Oral, Daily  cefTRIAXone, 1 g, Intravenous, Q24H  fludrocortisone, 100 mcg, Oral, Q12H  insulin lispro, 0-7 Units, Subcutaneous, TID AC  levETIRAcetam, 500 mg, Oral, BID  lisinopril, 10 mg, Oral, Q24H  nicotine, 1 patch, Transdermal, Q24H  niMODipine, 60 mg, Oral, Q4H  pantoprazole, 40 mg, Oral, Q AM  polyethylene glycol, 17 g, Oral, Daily   And  senna-docusate sodium, 2 tablet, Oral, BID  sodium chloride, 2 g, Oral, TID With Meals  ticagrelor, 90 mg, Oral, BID        acetaminophen  •  senna-docusate sodium  **AND** polyethylene glycol **AND** bisacodyl **AND** bisacodyl  •  busPIRone  •  clonazePAM  •  dextrose  •  dextrose  •  enalaprilat  •  glucagon (human recombinant)  •  hydrALAZINE  •  labetalol  •  magnesium sulfate **OR** magnesium sulfate **OR** magnesium sulfate  •  ondansetron  •  oxyCODONE-acetaminophen  •  potassium chloride **OR** potassium chloride **OR** potassium chloride  •  potassium phosphate infusion greater than 15 mMoles **OR** potassium phosphate infusion greater than 15 mMoles **OR** potassium phosphate **OR** sodium phosphate IVPB **OR** sodium phosphate IVPB **OR** sodium phosphate IVPB  •  simethicone    Assessment & Plan   IMPRESSION / PLAN     Inpatient Problem List:  46 y.o.female:  Active Hospital Problems    Diagnosis    • **SAH     • Hyperglycemia    • Smoker    • Class 1 obesity in adult    • Illicit drug use (UDS + meth/amphetamines, opiates, and oxycodone)     • Essential hypertension    • Iron deficiency anemia due to chronic blood loss         Hospital Course:  Ms. Celis is a 47yo F who presented to [facility] for headache. Imaging revealed a large subarachnoid hemorrhage with intraventricular involvement but no evidence of aneurysm and was transferred to [facility] on 7/25/22 for neurosurgical evaluation. Cerebral angiogram on 7/26 was negative for AVM or aneurysm. UDS was positive for methamphetamines and opiates. She was started on Nimotop, Keppra and daily TCD. 3% Saline was started.      She was taken back to the cath lab by Dr. 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      TCDs show improving velocities.  Slightly worse today.     LUE duplex from 8/2/22 shows a superficial thrombophlebitis.    Impression:  TCDs improving.  Wants to go home.    Plan:    SAH  Per NS.  She was changed from 3% to 1.5% saline on 8/6/22, remains on ASA / Brilinta, 21 days of Nimotop.  Day#16, exiting vasospasm window.  Steroids / keppra also per  NS.    Anemia  Leukocytosis  monitor    Hypomagnesemia  Replace prn    Hyperglycemia  Improved, no longer on steroids, d/cd levemir, continue correction scale    Possible UTI  Rocephin started 8/8    DVT Prophylaxis  SCDs    D/cd Martínez 8/7/22    Ok for tele from my standpoint when ok with Neuro, staying in ICU today for fluctuating TCDs    Nutrition  Dietary Orders (From admission, onward)     Start     Ordered    08/07/22 1800  DIET MESSAGE Please send pepperoni pizza and a salad with ranch please. Thank you!  Daily With Dinner      Comments: Please send pepperoni pizza and a salad with ranch please. Thank you!    08/07/22 1428    08/06/22 1408  DIET MESSAGE Please send pepperoni pizza and salad with ranch for dinner. Thank you!  Once        Comments: Please send pepperoni pizza and salad with ranch for dinner. Thank you!    08/06/22 1407    07/31/22 1430  Diet Regular  Diet Effective Now        Question:  Diet Texture / Consistency  Answer:  Regular    07/31/22 1430              Plan of care and goals reviewed with multidisciplinary team at daily rounds         Ethan Burrows MD  Intensive Care Medicine  08/08/22 13:27 EDT

## 2022-08-08 NOTE — PAYOR COMM NOTE
"Continued Stay  Sharon Nguyễn RN  Utilization Management  P:[phone]  F:[phone]  Auth #[id number]  Mary Menezes (46 y.o. Female)             Date of Birth   1975    Social Security Number       Address   [address] [place] [id number]    Home Phone   [phone]    MRN   [id number]       Holiness   None    Marital Status                               Admission Date   7/25/22    Admission Type   Urgent    Admitting Provider       Attending Provider   Ethan Burrows MD    Department, Room/Bed   [facility] 2B ICU, N236/1       Discharge Date       Discharge Disposition       Discharge Destination                               Attending Provider: Ethan Burrows MD    Allergies: No Known Allergies    Isolation: None   Infection: None   Code Status: CPR   Advance Care Planning Activity    Ht: 170 cm (66.93\")   Wt: 102 kg (225 lb)    Admission Cmt: None   Principal Problem: SAH  [I60.9]                 Active Insurance as of 7/25/2022     Primary Coverage     Payor Plan Insurance Group Employer/Plan Group    [employer]UE [employer] PPO [id number]     Payor Plan Address Payor Plan Phone Number Payor Plan Fax Number Effective Dates    [phone]  1/1/2015 - None Entered    [place] [id number]       Subscriber Name Subscriber Birth Date Member ID       LAYA MENEZES 1975 [id number]                       Current Facility-Administered Medications   Medication Dose Route Frequency Provider Last Rate Last Admin   • acetaminophen (TYLENOL) tablet 325 mg  325 mg Oral Daily Duran Willis MD   325 mg at 08/08/22 0814   • acetaminophen (TYLENOL) tablet 650 mg  650 mg Oral Q4H PRN Duran Willis MD   650 mg at 08/07/22 1429   • aspirin chewable tablet 81 mg  81 mg Oral Daily Duran Willis MD   81 mg at 08/08/22 0813   • polyethylene glycol (MIRALAX) packet 17 g  17 g Oral Daily Case, " Mary V., DO   17 g at 08/08/22 0813    And   • sennosides-docusate (PERICOLACE) 8.6-50 MG per tablet 2 tablet  2 tablet Oral BID Case, Mary V., DO   2 tablet at 08/08/22 0814    And   • bisacodyl (DULCOLAX) EC tablet 5 mg  5 mg Oral Daily PRN Case, Mary V., DO   5 mg at 08/05/22 0831    And   • bisacodyl (DULCOLAX) suppository 10 mg  10 mg Rectal Daily PRN Case, Mary V., DO   10 mg at 08/05/22 1220   • busPIRone (BUSPAR) tablet 10 mg  10 mg Oral TID PRN Case, Mary V., DO   10 mg at 08/08/22 0814   • cefTRIAXone (ROCEPHIN) 1 g/100 mL 0.9% NS (MBP)  1 g Intravenous Q24H Flavio Henriquez APRN   1 g at 08/08/22 0228   • dextrose (D50W) (25 g/50 mL) IV injection 25 g  25 g Intravenous Q15 Min PRN Laisha Siddiqui PA-C       • dextrose (GLUTOSE) oral gel 15 g  15 g Oral Q15 Min PRN Laisha Siddiqui PA-C       • enalaprilat (VASOTEC) injection 1.25 mg  1.25 mg Intravenous Q6H PRN Duran Willis MD   1.25 mg at 07/29/22 2112   • fludrocortisone tablet 100 mcg  100 mcg Oral Q12H Brannon Barrientos MD   100 mcg at 08/08/22 1034   • glucagon (human recombinant) (GLUCAGEN DIAGNOSTIC) injection 1 mg  1 mg Intramuscular Q15 Min PRN Laisha Siddiqui PA-C       • hydrALAZINE (APRESOLINE) injection 10 mg  10 mg Intravenous Q4H PRN Duran Willis MD   10 mg at 07/30/22 0040   • Insulin Lispro (humaLOG) injection 0-7 Units  0-7 Units Subcutaneous TID AC Laisha Siddiqui PA-C   2 Units at 08/08/22 1156   • labetalol (NORMODYNE,TRANDATE) injection 10 mg  10 mg Intravenous Q10 Min PRN Duran Willis MD       • levETIRAcetam (KEPPRA) tablet 500 mg  500 mg Oral BID Duran Willis MD   500 mg at 08/08/22 0814   • lisinopril (PRINIVIL,ZESTRIL) tablet 10 mg  10 mg Oral Q24H Case, Mary V., DO   10 mg at 08/08/22 0814   • Magnesium Sulfate 2 gram Bolus, followed by 8 gram infusion (total Mg dose 10 grams)- Mg less than or equal to 1mg/dL  2 g Intravenous PRN Ethan Burrows  MD [place]        Or   • Magnesium Sulfate 2 gram / 50mL Infusion (GIVE X 3 BAGS TO EQUAL 6GM TOTAL DOSE) - Mg 1.1 - 1.5 mg/dl  2 g Intravenous PRN Ethan Burrows MD        Or   • Magnesium Sulfate 4 gram infusion- Mg 1.6-1.9 mg/dL  4 g Intravenous PRN Ethan Burrows MD 25 mL/hr at 08/07/22 1617 4 g at 08/07/22 1617   • niCARdipine (CARDENE) 20 mg in 200 mL NS infusion  5-15 mg/hr Intravenous Titrated Duran Willis MD   Held at 07/31/22 1439   • nicotine (NICODERM CQ) 21 MG/24HR patch 1 patch  1 patch Transdermal Q24H Laisha Siddiqui PA-C   1 patch at 08/08/22 0813   • niMODipine (NIMOTOP) capsule 60 mg  60 mg Oral Q4H Brannon Barrientos MD   60 mg at 08/08/22 1002   • ondansetron (ZOFRAN) injection 4 mg  4 mg Intravenous Q6H PRN Duran Willis MD   4 mg at 08/01/22 1223   • oxyCODONE-acetaminophen (PERCOCET) 7.5-325 MG per tablet 1 tablet  1 tablet Oral Q4H PRN Judith Valencia APRN   1 tablet at 08/08/22 0814   • pantoprazole (PROTONIX) EC tablet 40 mg  40 mg Oral Q AM Case, Mary V., DO   40 mg at 08/08/22 0614   • potassium chloride (MICRO-K) CR capsule 40 mEq  40 mEq Oral PRN Ethan Burrows MD        Or   • potassium chloride (KLOR-CON) packet 40 mEq  40 mEq Oral PRN Ethan Burrows MD        Or   • potassium chloride 10 mEq in 100 mL IVPB  10 mEq Intravenous Q1H PRN Ethan Burrows MD       • potassium phosphate 45 mmol in sodium chloride 0.9 % 500 mL infusion  45 mmol Intravenous PRN Ethan Burrows MD        Or   • potassium phosphate 30 mmol in sodium chloride 0.9 % 250 mL infusion  30 mmol Intravenous PRN Ethan Burrows MD        Or   • potassium phosphate 15 mmol in 0.9% sodium chloride 100 mL IVPB  15 mmol Intravenous PRN Ethan Burrows MD        Or   • sodium phosphates 45 mmol in sodium chloride 0.9 % 250 mL IVPB  45 mmol Intravenous PRN Markos  "Ethan Rdz MD        Or   • sodium phosphates 30 mmol in sodium chloride 0.9 % 250 mL IVPB  30 mmol Intravenous PRN Ethan Burrows MD        Or   • sodium phosphates 15 mmol in sodium chloride 0.9 % 250 mL IVPB  15 mmol Intravenous PRN Ethan Burrows MD       • simethicone (MYLICON) chewable tablet 80 mg  80 mg Oral 4x Daily PRN Case, Mary V., DO   80 mg at 08/04/22 1947   • sodium chloride 1.5% (HYPERTONIC) 500 mL infusion  100 mL/hr Intravenous Continuous Given, Spencer GARCIA  mL/hr at 08/08/22 1156 100 mL/hr at 08/08/22 1156   • sodium chloride tablet 2 g  2 g Oral TID With Meals Laisha Siddiqui PA-C   2 g at 08/08/22 1156   • ticagrelor (BRILINTA) tablet 90 mg  90 mg Oral BID Duran Willis MD   90 mg at 08/08/22 0814     Operative/Procedure Notes (last 72 hours)  Notes from 08/05/22 1237 through 08/08/22 1237   No notes of this type exist for this encounter.            Physician Progress Notes (last 72 hours)      Brannon Barrientos MD at 08/08/22 0852          NEUROSURGERY PROGRESS NOTE    Chief Complaint: Subarachnoid hemorrhage     Subjective: neuro stable overnight.  Sodium has dropped to 133.    Objective    Vital Signs: Blood pressure 134/70, pulse 60, temperature 98.5 °F (36.9 °C), temperature source Oral, resp. rate 15, height 170 cm (66.93\"), weight 102 kg (225 lb), last menstrual period 05/08/2018, SpO2 95 %, not currently breastfeeding.    Physical Exam  Awake, alert and oriented x 3  Opens eyes spont  Pupils 3 mm rx bilat  Extraocular muscles intact bilaterally  Face symmetric bilaterally  Tongue midline  5/5 in all 4 ext  No pronator drift    Intake/Output:     Intake/Output Summary (Last 24 hours) at 8/8/2022 0852  Last data filed at 8/8/2022 0610  Gross per 24 hour   Intake 2257.5 ml   Output 3815 ml   Net -1557.5 ml       Current Medications:   Current Facility-Administered Medications:   •  acetaminophen (TYLENOL) tablet 325 mg, 325 mg, Oral, " Daily, Duran Willis MD, 325 mg at 08/08/22 0814  •  acetaminophen (TYLENOL) tablet 650 mg, 650 mg, Oral, Q4H PRN, Duran Willis MD, 650 mg at 08/07/22 1429  •  aspirin chewable tablet 81 mg, 81 mg, Oral, Daily, Duran Willis MD, 81 mg at 08/08/22 0813  •  sennosides-docusate (PERICOLACE) 8.6-50 MG per tablet 2 tablet, 2 tablet, Oral, BID, 2 tablet at 08/08/22 0814 **AND** polyethylene glycol (MIRALAX) packet 17 g, 17 g, Oral, Daily, 17 g at 08/08/22 0813 **AND** bisacodyl (DULCOLAX) EC tablet 5 mg, 5 mg, Oral, Daily PRN, 5 mg at 08/05/22 0831 **AND** bisacodyl (DULCOLAX) suppository 10 mg, 10 mg, Rectal, Daily PRN, Lester Mary V., DO, 10 mg at 08/05/22 1220  •  busPIRone (BUSPAR) tablet 10 mg, 10 mg, Oral, TID PRN, Lester Mary V., DO, 10 mg at 08/08/22 0814  •  cefTRIAXone (ROCEPHIN) 1 g/100 mL 0.9% NS (MBP), 1 g, Intravenous, Q24H, Flavio Henriquez, APRN, 1 g at 08/08/22 0228  •  dextrose (D50W) (25 g/50 mL) IV injection 25 g, 25 g, Intravenous, Q15 Min PRN, Laisha Siddiqui PA-C  •  dextrose (GLUTOSE) oral gel 15 g, 15 g, Oral, Q15 Min PRN, Laisha Siddiqui PA-C  •  enalaprilat (VASOTEC) injection 1.25 mg, 1.25 mg, Intravenous, Q6H PRN, Duran Willis MD, 1.25 mg at 07/29/22 2112  •  fludrocortisone tablet 100 mcg, 100 mcg, Oral, Q12H, Brannon Barrientos MD  •  glucagon (human recombinant) (GLUCAGEN DIAGNOSTIC) injection 1 mg, 1 mg, Intramuscular, Q15 Min PRN, Laisha Siddiqui PA-C  •  hydrALAZINE (APRESOLINE) injection 10 mg, 10 mg, Intravenous, Q4H PRN, Duran Willis MD, 10 mg at 07/30/22 0040  •  Insulin Lispro (humaLOG) injection 0-7 Units, 0-7 Units, Subcutaneous, TID Artur CARLIN Talitha L, PA-C, 2 Units at 08/04/22 1716  •  labetalol (NORMODYNE,TRANDATE) injection 10 mg, 10 mg, Intravenous, Q10 Min PRN, Duran Willis MD  •  levETIRAcetam (KEPPRA) tablet 500 mg, 500 mg, Oral, BID, Duran Willis MD, 500 mg at 08/08/22  0814  •  lisinopril (PRINIVIL,ZESTRIL) tablet 10 mg, 10 mg, Oral, Q24H, Case, Mary V., DO, 10 mg at 08/08/22 0814  •  Magnesium Sulfate 2 gram Bolus, followed by 8 gram infusion (total Mg dose 10 grams)- Mg less than or equal to 1mg/dL, 2 g, Intravenous, PRN **OR** Magnesium Sulfate 2 gram / 50mL Infusion (GIVE X 3 BAGS TO EQUAL 6GM TOTAL DOSE) - Mg 1.1 - 1.5 mg/dl, 2 g, Intravenous, PRN **OR** Magnesium Sulfate 4 gram infusion- Mg 1.6-1.9 mg/dL, 4 g, Intravenous, PRN, Ethan Burrows MD, Last Rate: 25 mL/hr at 08/07/22 1617, 4 g at 08/07/22 1617  •  niCARdipine (CARDENE) 20 mg in 200 mL NS infusion, 5-15 mg/hr, Intravenous, Titrated, Duran Willis MD, Held at 07/31/22 1439  •  nicotine (NICODERM CQ) 21 MG/24HR patch 1 patch, 1 patch, Transdermal, Q24H, Laisha Siddiqui PA-C, 1 patch at 08/08/22 0813  •  niMODipine (NIMOTOP) capsule 60 mg, 60 mg, Oral, Q4H, Duran Willis MD, 60 mg at 08/08/22 0614  •  ondansetron (ZOFRAN) injection 4 mg, 4 mg, Intravenous, Q6H PRN, Duran Willis MD, 4 mg at 08/01/22 1223  •  oxyCODONE-acetaminophen (PERCOCET) 7.5-325 MG per tablet 1 tablet, 1 tablet, Oral, Q4H PRN, Judith Valencia APRN, 1 tablet at 08/08/22 0814  •  pantoprazole (PROTONIX) EC tablet 40 mg, 40 mg, Oral, Q AM, Case, Mary V., DO, 40 mg at 08/08/22 0614  •  potassium chloride (MICRO-K) CR capsule 40 mEq, 40 mEq, Oral, PRN **OR** potassium chloride (KLOR-CON) packet 40 mEq, 40 mEq, Oral, PRN **OR** potassium chloride 10 mEq in 100 mL IVPB, 10 mEq, Intravenous, Q1H PRN, Ethan Burrows MD  •  potassium phosphate 45 mmol in sodium chloride 0.9 % 500 mL infusion, 45 mmol, Intravenous, PRN **OR** potassium phosphate 30 mmol in sodium chloride 0.9 % 250 mL infusion, 30 mmol, Intravenous, PRN **OR** potassium phosphate 15 mmol in 0.9% sodium chloride 100 mL IVPB, 15 mmol, Intravenous, PRN **OR** sodium phosphates 45 mmol in sodium chloride 0.9 %  250 mL IVPB, 45 mmol, Intravenous, PRN **OR** sodium phosphates 30 mmol in sodium chloride 0.9 % 250 mL IVPB, 30 mmol, Intravenous, PRN **OR** sodium phosphates 15 mmol in sodium chloride 0.9 % 250 mL IVPB, 15 mmol, Intravenous, PRN, Ethan Burrows MD  •  simethicone (MYLICON) chewable tablet 80 mg, 80 mg, Oral, 4x Daily PRN, Mary Batista DO, 80 mg at 08/04/22 1947  •  sodium chloride 1.5% (HYPERTONIC) 500 mL infusion, 100 mL/hr, Intravenous, Continuous, Given, Spencer GARCIA MD, Last Rate: 100 mL/hr at 08/08/22 0610, 100 mL/hr at 08/08/22 0610  •  sodium chloride tablet 2 g, 2 g, Oral, TID With Meals, Laisha Siddiqui PA-C, 2 g at 08/08/22 0813  •  ticagrelor (BRILINTA) tablet 90 mg, 90 mg, Oral, BID, Duran Willis MD, 90 mg at 08/08/22 0814     Laboratory Results:       Lab 08/07/22  0428 08/06/22  0512   WBC 17.63* 18.31*   HEMOGLOBIN 9.9* 9.3*   HEMATOCRIT 32.3* 30.1*   PLATELETS 400 415   NEUTROS ABS 12.89*  --    IMMATURE GRANS (ABS) 0.46*  --    LYMPHS ABS 2.66  --    MONOS ABS 1.44*  --    EOS ABS 0.14  --    MCV 81.8 81.1         Lab 08/08/22  0611 08/07/22  1816 08/07/22  0428 08/07/22  0003 08/06/22  1126 08/06/22  0512 08/05/22  0608 08/04/22  2329 08/04/22  0530 08/03/22  2358   SODIUM 133* 138 135* 135* 137 138   < > 140  140   < > 140  140   POTASSIUM 4.1  --  3.8  --   --  4.0  --  4.6  --  4.2   CHLORIDE 96*  --  98  --   --  102  --  105  --  107   CO2 28.0  --  31.0*  --   --  28.0  --  27.0  --  23.0   ANION GAP 9.0  --  6.0  --   --  8.0  --  8.0  --  10.0   BUN 14  --  16  --   --  17  --  13  --  14   CREATININE 0.54*  --  0.45*  --   --  0.53*  --  0.40*  --  0.56*   EGFR 115.2  --  120.3  --   --  115.7  --  123.8  --  114.1   GLUCOSE 108*  --  136*  --   --  100*  --  120*  --  233*   CALCIUM 8.4*  --  8.4*  --   --  7.9*  --  8.1*  --  8.1*   MAGNESIUM 2.0  --  1.8  --   --  1.6  --  1.7  --  1.8   PHOSPHORUS 4.5  --  4.6*  --   --   --   --   --   --   --     <  > = values in this interval not displayed.         Lab 08/07/22  0428   TOTAL PROTEIN 5.6*   ALBUMIN 3.50   GLOBULIN 2.1   ALT (SGPT) 16   AST (SGOT) 15   BILIRUBIN 0.3   ALK PHOS 59                     Brief Urine Lab Results  (Last result in the past 365 days)      Color   Clarity   Blood   Leuk Est   Nitrite   Protein   CREAT   Urine HCG        08/07/22 2313 Yellow   Cloudy   Small (1+)   Moderate (2+)   Negative   Negative               Microbiology Results (last 10 days)     ** No results found for the last 240 hours. **           Diagnostic Imaging: I reviewed and independently interpreted the new imaging.     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  -Neurologically, patient has remained stable.  TCD''s continue to downtrend.  She is on 1.5% saline.  Sodium dropped to 133 overnight.  I started her on Florinef.  We will look to get her sodium stable and if so, can discharge.      Any copied data from previous notes included in the (1) History of Present Illness, (2) Physical Examination and (3) Medical Decision Making and/or Assessment and Plan has been been reviewed and is accurate as of 08/08/22      Brannno Barrientos MD  08/08/22  08:52 EDT        Electronically signed by Brannon Barrientos MD at 08/08/22 0854     Ethan Burrows MD at 08/07/22 1148          INTENSIVIST / PULMONARY FOLLOW UP NOTE     Hospital:  LOS: 13 days   Ms. Mary Celis, 46 y.o. female is followed for:     SAH     Iron deficiency anemia due to chronic blood loss    Essential hypertension    Smoker    Class 1 obesity in adult    Illicit drug use (UDS + meth/amphetamines, opiates, and oxycodone)     Hyperglycemia       Subjective   SUBJECTIVE   No complaints    The patient's relevant past medical, surgical, family, and social history were reviewed    Allergies and medications were  reviewed    ROS:    Per subjective, all other systems were reviewed and were negative     Objective   OBJECTIVE     Vital Sign Min/Max for last 24 hours:  Temp  Min: 97.6 °F (36.4 °C)  Max: 98.4 °F (36.9 °C)   BP  Min: 114/84  Max: 148/81   Pulse  Min: 50  Max: 68   Resp  Min: 16  Max: 20   SpO2  Min: 95 %  Max: 99 %   No data recorded     Physical Exam:  General Appearance:  No acute distress  Eyes:  No scleral icterus or pallor, pupils normal  Ears, Nose, Mouth, Throat:  Atraumatic, oropharynx clear  Neck:  Trachea midline, thyroid normal  Respiratory:  Clear to auscultation bilaterally, normal effort  Cardiovascular:  Regular rate and rhythm, no murmurs, no peripheral edema  Gastrointestinal:  Soft, non-tender, non-distended, no hepatosplenomegaly  Skin:  Normal temperature, no rash  Psychiatric:  No agitation  Neuro:    Interval:  (shift)  1a. Level of Consciousness: 0-->Alert, keenly responsive  1b. LOC Questions: 0-->Answers both questions correctly  1c. LOC Commands: 0-->Performs both tasks correctly  2. Best Gaze: 0-->Normal  3. Visual: 0-->No visual loss  4. Facial Palsy: 0-->Normal symmetrical movements  5a. Motor Arm, Left: 0-->No drift, limb holds 90 (or 45) degrees for full 10 secs  5b. Motor Arm, Right: 0-->No drift, limb holds 90 (or 45) degrees for full 10 secs  6a. Motor Leg, Left: 0-->No drift, leg holds 30 degree position for full 5 secs  6b. Motor Leg, Right: 0-->No drift, leg holds 30 degree position for full 5 secs  7. Limb Ataxia: 0-->Absent  8. Sensory: 0-->Normal, no sensory loss  9. Best Language: 0-->No aphasia, normal  10. Dysarthria: 0-->Normal  11. Extinction and Inattention (formerly Neglect): 0-->No abnormality    Total (NIH Stroke Scale): 0      Telemetry:              Hemodynamics:   CVP:     PAP:     PAOP:     CO:     CI:     SVI:     SVR:       SpO2: 96 % SpO2  Min: 95 %  Max: 99 %   Device:      Flow Rate:   No data recorded     Mechanical Ventilator Settings:                                          Intake/Ouptut 24 hrs (7:00AM - 6:59 AM)  Intake & Output (last 3 days)       08/04 0701 08/05 0700 08/05 0701 08/06 0700 08/06 0701 08/07 0700 08/07 0701  08/08 0700    P.O. 3730 1572 1320 350    I.V. (mL/kg) 3638.7 (35.7) 1892.6 (18.6) 1472 (14.4) 1707 (16.7)    Total Intake(mL/kg) 7368.7 (72.2) 3464.6 (34) 2792 (27.4) 2057 (20.2)    Urine (mL/kg/hr) 8075 (3.3) 8100 (3.3) 7430 (3) 285 (0.6)    Stool 0 0 0     Total Output 8075 8100 7430 285    Net -706.3 -4635.4 -4638 +1772            Stool Unmeasured Occurrence 1 x 2 x 1 x           Lines, Drains & Airways     Active LDAs     Name Placement date Placement time Site Days    PICC Triple Lumen 07/30/22 Right Basilic 07/30/22  1138  Basilic  7    Urethral Catheter 07/31/22  1100  -- 6    Arterial Line 07/31/22 Left Radial 07/31/22  1034  created via procedure documentation  Radial  6                Hematology:  Results from last 7 days   Lab Units 08/07/22  0428 08/06/22  0512 08/01/22  0501   WBC 10*3/mm3 17.63* 18.31* 22.15*   HEMOGLOBIN g/dL 9.9* 9.3* 10.4*   HEMATOCRIT % 32.3* 30.1* 33.6*   PLATELETS 10*3/mm3 400 415 437     Electrolytes, Magnesium and Phosphorus:  Results from last 7 days   Lab Units 08/07/22  0428 08/07/22  0003 08/06/22  1126 08/06/22  0512 08/06/22  0023 08/05/22  1158 08/05/22  0608 08/04/22  2329 08/04/22  0530 08/03/22  2358 08/03/22  1210 08/03/22  0505 08/02/22  1208 08/02/22  0540 08/01/22  1304 08/01/22  0501   SODIUM mmol/L 135* 135* 137 138 141 141 140 140  140   < > 140  140   < > 140  140   < > 137   < > 138   CHLORIDE mmol/L 98  --   --  102  --   --   --  105  --  107  --  106  --  104  --  106   POTASSIUM mmol/L 3.8  --   --  4.0  --   --   --  4.6  --  4.2  --  4.2  --  4.2  --  4.6   CO2 mmol/L 31.0*  --   --  28.0  --   --   --  27.0  --  23.0  --  25.0  --  24.0  --  26.0   MAGNESIUM mg/dL 1.8  --   --  1.6  --   --   --  1.7  --  1.8  --  1.7  --  1.8  --  2.1   PHOSPHORUS mg/dL 4.6*  --   --   --    --   --   --   --   --   --   --   --   --   --   --  3.2    < > = values in this interval not displayed.     Renal:  Results from last 7 days   Lab Units 08/07/22  0428 08/06/22  0512 08/04/22  2329 08/03/22  2358 08/03/22  0505 08/02/22  0540 08/01/22  0501   CREATININE mg/dL 0.45* 0.53* 0.40* 0.56* 0.48* 0.57 0.49*   BUN mg/dL 16 17 13 14 13 13 15     Estimated Creatinine Clearance: 191.4 mL/min (A) (by C-G formula based on SCr of 0.45 mg/dL (L)).  Hepatic:  Results from last 7 days   Lab Units 08/07/22 0428   ALK PHOS U/L 59   BILIRUBIN mg/dL 0.3   ALT (SGPT) U/L 16   AST (SGOT) U/L 15     Arterial Blood Gases:              No results found for: LACTATE    Relevant imaging studies and labs from 08/07/22 were reviewed    Medications (drips):  niCARdipine, Last Rate: Stopped (07/31/22 1439)  sodium chloride 1.5% (HYPERTONIC) 500 mL infusion, Last Rate: 100 mL/hr (08/07/22 0619)        acetaminophen, 325 mg, Oral, Daily  aspirin, 81 mg, Oral, Daily  insulin lispro, 0-7 Units, Subcutaneous, TID AC  levETIRAcetam, 500 mg, Oral, BID  lisinopril, 10 mg, Oral, Q24H  nicotine, 1 patch, Transdermal, Q24H  niMODipine, 60 mg, Oral, Q4H  pantoprazole, 40 mg, Oral, Q AM  polyethylene glycol, 17 g, Oral, Daily   And  senna-docusate sodium, 2 tablet, Oral, BID  sodium chloride, 2 g, Oral, TID With Meals  ticagrelor, 90 mg, Oral, BID        acetaminophen  •  senna-docusate sodium **AND** polyethylene glycol **AND** bisacodyl **AND** bisacodyl  •  busPIRone  •  dextrose  •  dextrose  •  enalaprilat  •  glucagon (human recombinant)  •  hydrALAZINE  •  HYDROmorphone  •  labetalol  •  magnesium sulfate **OR** magnesium sulfate **OR** magnesium sulfate  •  ondansetron  •  oxyCODONE-acetaminophen  •  potassium chloride **OR** potassium chloride **OR** potassium chloride  •  potassium phosphate infusion greater than 15 mMoles **OR** potassium phosphate infusion greater than 15 mMoles **OR** potassium phosphate **OR** sodium phosphate  IVPB **OR** sodium phosphate IVPB **OR** sodium phosphate IVPB  •  simethicone    Assessment & Plan   IMPRESSION / PLAN     Inpatient Problem List:  46 y.o.female:  Active Hospital Problems    Diagnosis    • **SAH     • Hyperglycemia    • Smoker    • Class 1 obesity in adult    • Illicit drug use (UDS + meth/amphetamines, opiates, and oxycodone)     • Essential hypertension    • Iron deficiency anemia due to chronic blood loss         Hospital Course:  Ms. Celis is a 45yo F who [facility] to [facility] for headache. Imaging revealed a large subarachnoid hemorrhage with intraventricular involvement but no evidence of aneurysm and was transferred to [facility] on 7/25/22 for neurosurgical evaluation. Cerebral angiogram on 7/26 was negative for AVM or aneurysm. UDS was positive for methamphetamines and opiates. She was started on Nimotop, Keppra and daily TCD. 3% Saline was started.      She was taken back to the cath lab by Dr. 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      TCDs show improving velocities.       LUE duplex from 8/2/22 shows a superficial thrombophlebitis.    Impression:  TCDs improving.  Wants to go home.    Plan:    SAH  Per NS.  She was changed from 3% to 1.5% saline on 8/6/22, remains on ASA / Brilinta, 21 days of Nimotop.  Day#15, exiting vasospasm window.  Steroids / keppra also per NS.    Anemia  Leukocytosis  monitor    Hypomagnesemia  Replace prn    Hyperglycemia  Improved, no longer on steroids, d/cd levemir, continue correction scale    DVT Prophylaxis  SCDs    D/c Mauricio Gonzalez for tele from my standpoint if ok with Neuro    Nutrition  Dietary Orders (From admission, onward)     Start     Ordered    08/06/22 1408  DIET MESSAGE Please send pepperoni pizza and salad with ranch for dinner. Thank you!  Once        Comments: Please send pepperoni pizza and salad with ranch for dinner. Thank you!    08/06/22 1407    07/31/22 1430  Diet Regular  Diet Effective Now       "  Question:  Diet Texture / Consistency  Answer:  Regular    22 1430              Plan of care and goals reviewed with multidisciplinary team at daily rounds         Ethan Burrows MD  Intensive Care Medicine  22 11:48 EDT         Electronically signed by Ethan Burrows MD at 22 1150     Given, Spencer GARCIA MD at 22 [phone]          NAME: PRATIK MENEZES  : 1975  PCP: System, Provider Not In  ADMITTING PHYSICIAN: Ethan Burrows*    DATE OF ADMISSION:  2022  DATE OF SERVICE: 2022    HOSPITAL DAY:  13 days, SAH day #15    HISTORY OF PRESENT ILLNESS:  46 y.o. female who experienced acute onset of severe headache on the evening of 2022.  She was transferred to [facility] on 2022 and underwent diagnostic angiography on 2022 which failed to demonstrate any aneurysm or \"culprit\" lesion to account for her subarachnoid hemorrhage.  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  The aneurysm was treated with flow diverter therapy on 2022, with subsequent follow-up CT angiogram on 2022 demonstrating occlusion of the aneurysm and preservation of flow in parent vasculature.    REVIEW OF IMAGING:  CT head on the morning of 2022 demonstrates resolved subarachnoid hemorrhage.  No new hemorrhages or hydrocephalus.  No areas of infarct identified.  No acute abnormalities.    LABS:  Lab Results   Component Value Date    WBC 17.63 (H) 2022    HGB 9.9 (L) 2022    HCT 32.3 (L) 2022    MCV 81.8 2022     2022     Lab Results   Component Value Date    GLUCOSE 136 (H) 2022    CALCIUM 8.4 (L) 2022     (L) 2022    K 3.8 2022    CO2 31.0 (H) 2022    CL 98 2022    BUN 16 2022    CREATININE 0.45 (L) 2022    EGFRIFAFRI >60 2019    EGFRIFNONA 110 2017    BCR 35.6 (H) 2022    ANIONGAP 6.0 2022 "       CURRENT MEDS:  Current Facility-Administered Medications   Medication Dose Route Frequency Provider Last Rate Last Admin   • acetaminophen (TYLENOL) tablet 325 mg  325 mg Oral Daily Duran Willis MD   325 mg at 08/06/22 0819   • acetaminophen (TYLENOL) tablet 650 mg  650 mg Oral Q4H PRN Duran Willis MD   650 mg at 08/01/22 0356   • aspirin chewable tablet 81 mg  81 mg Oral Daily Duran Willis MD   81 mg at 08/06/22 0818   • polyethylene glycol (MIRALAX) packet 17 g  17 g Oral Daily Case, Mary V., DO   17 g at 08/06/22 0818    And   • sennosides-docusate (PERICOLACE) 8.6-50 MG per tablet 2 tablet  2 tablet Oral BID Case, Mary V., DO   2 tablet at 08/06/22 2005    And   • bisacodyl (DULCOLAX) EC tablet 5 mg  5 mg Oral Daily PRN Case, Mary V., DO   5 mg at 08/05/22 0831    And   • bisacodyl (DULCOLAX) suppository 10 mg  10 mg Rectal Daily PRN Case, Mary V., DO   10 mg at 08/05/22 1220   • busPIRone (BUSPAR) tablet 10 mg  10 mg Oral TID PRN Case, Mary V., DO   10 mg at 08/07/22 0326   • dextrose (D50W) (25 g/50 mL) IV injection 25 g  25 g Intravenous Q15 Min PRN Laisha Siddiqui PA-C       • dextrose (GLUTOSE) oral gel 15 g  15 g Oral Q15 Min PRN Laisha Siddiqui PA-C       • enalaprilat (VASOTEC) injection 1.25 mg  1.25 mg Intravenous Q6H PRN Duran Willis MD   1.25 mg at 07/29/22 2112   • glucagon (human recombinant) (GLUCAGEN DIAGNOSTIC) injection 1 mg  1 mg Intramuscular Q15 Min PRN Laisha Siddiqui PA-C       • hydrALAZINE (APRESOLINE) injection 10 mg  10 mg Intravenous Q4H PRN Duran Willis MD   10 mg at 07/30/22 0040   • HYDROmorphone (DILAUDID) injection 1 mg  1 mg Intravenous Q4H PRN Duran Willis MD   1 mg at 08/07/22 0452   • Insulin Lispro (humaLOG) injection 0-7 Units  0-7 Units Subcutaneous TID AC Laisha Siddiqui PA-C   2 Units at 08/04/22 1716   • labetalol (NORMODYNE,TRANDATE) injection 10 mg  10 mg Intravenous  Q10 Min PRN Duran Willis MD       • levETIRAcetam (KEPPRA) tablet 500 mg  500 mg Oral BID Duran Willis MD   500 mg at 08/06/22 2005   • lisinopril (PRINIVIL,ZESTRIL) tablet 10 mg  10 mg Oral Q24H Case, Mary V., DO   10 mg at 08/06/22 0819   • Magnesium Sulfate 2 gram Bolus, followed by 8 gram infusion (total Mg dose 10 grams)- Mg less than or equal to 1mg/dL  2 g Intravenous PRN Ethan Burrows MD        Or   • Magnesium Sulfate 2 gram / 50mL Infusion (GIVE X 3 BAGS TO EQUAL 6GM TOTAL DOSE) - Mg 1.1 - 1.5 mg/dl  2 g Intravenous PRN Ethan Burrows MD        Or   • Magnesium Sulfate 4 gram infusion- Mg 1.6-1.9 mg/dL  4 g Intravenous PRN Ethan Burrows MD       • niCARdipine (CARDENE) 20 mg in 200 mL NS infusion  5-15 mg/hr Intravenous Titrated Duran iWllis MD   Held at 07/31/22 1439   • nicotine (NICODERM CQ) 21 MG/24HR patch 1 patch  1 patch Transdermal Q24H Laisha Siddiqui PA-C   1 patch at 08/06/22 0820   • niMODipine (NIMOTOP) capsule 60 mg  60 mg Oral Q4H Duran Willis MD   60 mg at 08/07/22 0706   • ondansetron (ZOFRAN) injection 4 mg  4 mg Intravenous Q6H PRN Duran Willis MD   4 mg at 08/01/22 1223   • oxyCODONE-acetaminophen (PERCOCET) 7.5-325 MG per tablet 1 tablet  1 tablet Oral Q4H PRN Duran Willis MD   1 tablet at 08/07/22 0326   • pantoprazole (PROTONIX) EC tablet 40 mg  40 mg Oral Q AM Case, Mary V., DO   40 mg at 08/07/22 0706   • potassium chloride (MICRO-K) CR capsule 40 mEq  40 mEq Oral PRN Ethan Burrows MD        Or   • potassium chloride (KLOR-CON) packet 40 mEq  40 mEq Oral PRN Ethan Burrows MD        Or   • potassium chloride 10 mEq in 100 mL IVPB  10 mEq Intravenous Q1H PRN Ethan Burrows MD       • potassium phosphate 45 mmol in sodium chloride 0.9 % 500 mL infusion  45 mmol Intravenous PRN Ethan Burrows MD         Or   • potassium phosphate 30 mmol in sodium chloride 0.9 % 250 mL infusion  30 mmol Intravenous PRN Ethan Burrows MD        Or   • potassium phosphate 15 mmol in 0.9% sodium chloride 100 mL IVPB  15 mmol Intravenous PRN Ethan Burrows MD        Or   • sodium phosphates 45 mmol in sodium chloride 0.9 % 250 mL IVPB  45 mmol Intravenous PRN Ethan Burrows MD        Or   • sodium phosphates 30 mmol in sodium chloride 0.9 % 250 mL IVPB  30 mmol Intravenous PRN Ethan Burrows MD        Or   • sodium phosphates 15 mmol in sodium chloride 0.9 % 250 mL IVPB  15 mmol Intravenous PRN Ethan Burrows MD       • simethicone (MYLICON) chewable tablet 80 mg  80 mg Oral 4x Daily PRN Mary Batista DO   80 mg at 08/04/22 1947   • sodium chloride 1.5% (HYPERTONIC) 500 mL infusion  100 mL/hr Intravenous Continuous Analisa, Spencer GARCIA  mL/hr at 08/07/22 0619 100 mL/hr at 08/07/22 0619   • sodium chloride tablet 2 g  2 g Oral TID With Meals Laisha Siddiqui PA-C   2 g at 08/06/22 1731   • ticagrelor (BRILINTA) tablet 90 mg  90 mg Oral BID Duran Willis MD   90 mg at 08/06/22 2005       PHYSICAL EXAM:  Vitals:    08/07/22 0700   BP: 131/72   Pulse: 51   Resp:    Temp:    SpO2: 95%      Resting comfortably in bed, eating breakfast.  Headache was a little worse last night, but is subsided this morning.  No facial droop or pronator drift.  Symmetric strength in the extremities.  I do not appreciate any gross visual field deficits.  Speech is clear.    ASSESSMENT/PLAN:  Ms. Celis is a 46 y.o. 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  She is now SAH day #15, and her TCD's have been trending down over the past couple of days or so.  I suspect that she is effectively exiting the vasospasm window, and remains neurologically intact. Her sodium level is holding steady on 1.5%  on normal saline, and we will continue this today, hopefully transitioning her to normal saline tomorrow.      Encourage patient to be out of bed today.    She will need to remain on Brilinta/aspirin dual antiplatelet regimen for the next several months or so, and will need to complete a full 21-day course of Nimotop therapy.      Copied text in this note has been reviewed and is accurate as of 08/07/22.                 Electronically signed by Spencer Hauser MD at 08/07/22 3868     Ethan Burrows MD at 08/06/22 1331          INTENSIVIST / PULMONARY FOLLOW UP NOTE     Hospital:  LOS: 12 days   Ms. Mary Celis, 46 y.o. female is followed for:     SAH     Iron deficiency anemia due to chronic blood loss    Essential hypertension    Smoker    Class 1 obesity in adult    Illicit drug use (UDS + meth/amphetamines, opiates, and oxycodone)     Hyperglycemia       Subjective   SUBJECTIVE   No complaints    The patient's relevant past medical, surgical, family, and social history were reviewed    Allergies and medications were reviewed    ROS:    Per subjective, all other systems were reviewed and were negative     Objective   OBJECTIVE     Vital Sign Min/Max for last 24 hours:  Temp  Min: 97.8 °F (36.6 °C)  Max: 98.4 °F (36.9 °C)   BP  Min: 123/62  Max: 184/96   Pulse  Min: 53  Max: 67   Resp  Min: 14  Max: 18   SpO2  Min: 96 %  Max: 99 %   No data recorded     Physical Exam:  General Appearance:  No acute distress  Eyes:  No scleral icterus or pallor, pupils normal  Ears, Nose, Mouth, Throat:  Atraumatic, oropharynx clear  Neck:  Trachea midline, thyroid normal  Respiratory:  Clear to auscultation bilaterally, normal effort  Cardiovascular:  Regular rate and rhythm, no murmurs, no peripheral edema  Gastrointestinal:  Soft, non-tender, non-distended, no hepatosplenomegaly  Skin:  Normal temperature, no rash  Psychiatric:  No agitation  Neuro:    Interval:  (shift change)  1a. Level of Consciousness:  0-->Alert, keenly responsive  1b. LOC Questions: 0-->Answers both questions correctly  1c. LOC Commands: 0-->Performs both tasks correctly  2. Best Gaze: 0-->Normal  3. Visual: 0-->No visual loss  4. Facial Palsy: 0-->Normal symmetrical movements  5a. Motor Arm, Left: 0-->No drift, limb holds 90 (or 45) degrees for full 10 secs  5b. Motor Arm, Right: 0-->No drift, limb holds 90 (or 45) degrees for full 10 secs  6a. Motor Leg, Left: 0-->No drift, leg holds 30 degree position for full 5 secs  6b. Motor Leg, Right: 0-->No drift, leg holds 30 degree position for full 5 secs  7. Limb Ataxia: 0-->Absent  8. Sensory: 0-->Normal, no sensory loss  9. Best Language: 0-->No aphasia, normal  10. Dysarthria: 0-->Normal  11. Extinction and Inattention (formerly Neglect): 0-->No abnormality    Total (NIH Stroke Scale): 0      Telemetry:              Hemodynamics:   CVP:     PAP:     PAOP:     CO:     CI:     SVI:     SVR:       SpO2: 97 % SpO2  Min: 96 %  Max: 99 %   Device:      Flow Rate:   No data recorded     Mechanical Ventilator Settings:                                         Intake/Ouptut 24 hrs (7:00AM - 6:59 AM)  Intake & Output (last 3 days)       08/03 0701  08/04 0700 08/04 0701  08/05 0700 08/05 0701  08/06 0700 08/06 0701  08/07 0700    P.O. 2357 3730 1572 600    I.V. (mL/kg) 3646 (36.8) 3638.7 (35.7) 1892.6 (18.6) 1260 (12.4)    Total Intake(mL/kg) 6003 (60.5) 7368.7 (72.2) 3464.6 (34) 1860 (18.2)    Urine (mL/kg/hr) 6500 (2.7) 8075 (3.3) 8100 (3.3) 2725 (4.1)    Stool 0 0 0     Total Output 6500 8075 8100 2725    Net -497 -706.3 -4635.4 -865            Stool Unmeasured Occurrence 3 x 1 x 2 x           Lines, Drains & Airways     Active LDAs     Name Placement date Placement time Site Days    PICC Triple Lumen 07/30/22 Right Basilic 07/30/22  1138  Basilic  7    Urethral Catheter 07/31/22  1100  -- 6    Arterial Line 07/31/22 Left Radial 07/31/22  1034  created via procedure documentation  Radial  6                 Hematology:  Results from last 7 days   Lab Units 08/06/22  0512 08/01/22  0501   WBC 10*3/mm3 18.31* 22.15*   HEMOGLOBIN g/dL 9.3* 10.4*   HEMATOCRIT % 30.1* 33.6*   PLATELETS 10*3/mm3 415 437     Electrolytes, Magnesium and Phosphorus:  Results from last 7 days   Lab Units 08/06/22  1126 08/06/22  0512 08/06/22  0023 08/05/22  1158 08/05/22  0608 08/04/22 2329 08/04/22  1817 08/04/22  0530 08/03/22  2358 08/03/22  1210 08/03/22  0505 08/02/22  1208 08/02/22  0540 08/01/22  1304 08/01/22  0501 07/31/22  1830 07/31/22  1415 07/31/22  0505   SODIUM mmol/L 137 138 141 141 140 140  140 140   < > 140  140   < > 140  140   < > 137   < > 138   < > 137 138   CHLORIDE mmol/L  --  102  --   --   --  105  --   --  107  --  106  --  104  --  106  --   --  103   POTASSIUM mmol/L  --  4.0  --   --   --  4.6  --   --  4.2  --  4.2  --  4.2  --  4.6  --   --  4.3   CO2 mmol/L  --  28.0  --   --   --  27.0  --   --  23.0  --  25.0  --  24.0  --  26.0  --   --  28.0   MAGNESIUM mg/dL  --  1.6  --   --   --  1.7  --   --  1.8  --  1.7  --  1.8  --  2.1  --  1.9 2.7*   PHOSPHORUS mg/dL  --   --   --   --   --   --   --   --   --   --   --   --   --   --  3.2  --   --   --     < > = values in this interval not displayed.     Renal:  Results from last 7 days   Lab Units 08/06/22  0512 08/04/22 2329 08/03/22  2358 08/03/22  0505 08/02/22  0540 08/01/22  0501 07/31/22  0505   CREATININE mg/dL 0.53* 0.40* 0.56* 0.48* 0.57 0.49* 0.48*   BUN mg/dL 17 13 14 13 13 15 16     Estimated Creatinine Clearance: 162.5 mL/min (A) (by C-G formula based on SCr of 0.53 mg/dL (L)).  Hepatic:      Arterial Blood Gases:              No results found for: LACTATE    Relevant imaging studies and labs from 08/06/22 were reviewed    Medications (drips):  niCARdipine, Last Rate: Stopped (07/31/22 1439)  sodium chloride 1.5% (HYPERTONIC) 500 mL infusion, Last Rate: 100 mL/hr (08/06/22 1227)        acetaminophen, 325 mg, Oral, Daily  aspirin, 81 mg,  Oral, Daily  insulin detemir, 5 Units, Subcutaneous, Q12H  insulin lispro, 0-7 Units, Subcutaneous, TID AC  levETIRAcetam, 500 mg, Oral, BID  lisinopril, 10 mg, Oral, Q24H  nicotine, 1 patch, Transdermal, Q24H  niMODipine, 60 mg, Oral, Q4H  pantoprazole, 40 mg, Oral, Q AM  polyethylene glycol, 17 g, Oral, Daily   And  senna-docusate sodium, 2 tablet, Oral, BID  sodium chloride, 2 g, Oral, TID With Meals  ticagrelor, 90 mg, Oral, BID        acetaminophen  •  senna-docusate sodium **AND** polyethylene glycol **AND** bisacodyl **AND** bisacodyl  •  busPIRone  •  dextrose  •  dextrose  •  enalaprilat  •  glucagon (human recombinant)  •  hydrALAZINE  •  HYDROmorphone  •  labetalol  •  magnesium sulfate **OR** magnesium sulfate **OR** magnesium sulfate  •  ondansetron  •  oxyCODONE-acetaminophen  •  simethicone    Assessment & Plan   IMPRESSION / PLAN     Inpatient Problem List:  46 y.o.female:  Active Hospital Problems    Diagnosis    • **SAH     • Hyperglycemia    • Smoker    • Class 1 obesity in adult    • Illicit drug use (UDS + meth/amphetamines, opiates, and oxycodone)     • Essential hypertension    • Iron deficiency anemia due to chronic blood loss         Hospital Course:  Ms. Celis is a 45yo F who presented to [facility] for headache. Imaging revealed a large subarachnoid hemorrhage with intraventricular involvement but no evidence of aneurysm and was transferred to [facility] on 7/25/22 for neurosurgical evaluation. Cerebral angiogram on 7/26 was negative for AVM or aneurysm. UDS was positive for methamphetamines and opiates. She was started on Nimotop, Keppra and daily TCD. 3% Saline was started.      She was taken back to the cath lab by Dr. 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      TCDs show improving velocities.       LUE duplex from 8/2/22 shows a superficial thrombophlebitis.    Impression:  TCDs improving.  Wants to go home.    Plan:    SAH  Per NS.  Plan today to  "change from 3% to 1.5% saline, d/c olivia, remain on ASA / Brilinta, 21 days of Nimotop.  Day#14, exiting vasospasm window.  Steroids / keppra also per NS.    Anemia  Leukocytosis  monitor    Hypomagnesemia  replace    Hyperglycemia  Improved, no longer on steroids, d/c levemir, continue correction scale    DVT Prophylaxis  SCDs    Nutrition  Dietary Orders (From admission, onward)     Start     Ordered    22 1430  Diet Regular  Diet Effective Now        Question:  Diet Texture / Consistency  Answer:  Regular    22 1430              Plan of care and goals reviewed with multidisciplinary team at daily rounds         Ethan Burrows MD  Intensive Care Medicine  22 13:31 EDT         Electronically signed by Ethan Burrows MD at 22 1338     Spencer aHuser MD at 22 1119          NAME: PRATIK MENEZES  : 1975  PCP: System, Provider Not In  ADMITTING PHYSICIAN: Ethan Burrows*    DATE OF ADMISSION:  2022  DATE OF SERVICE: 2022    HOSPITAL DAY:  12 days, SAH day #14    HISTORY OF PRESENT ILLNESS:  46 y.o. female who experienced acute onset of severe headache on the evening of 2022.  She was transferred to [facility] on 2022 and underwent diagnostic angiography on 2022 which failed to demonstrate any aneurysm or \"culprit\" lesion to account for her subarachnoid hemorrhage.  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  The aneurysm was treated with flow diverter therapy on 2022, with subsequent follow-up CT angiogram on 2022 demonstrating occlusion of the aneurysm and preservation of flow in parent vasculature.    REVIEW OF IMAGING:  Transcranial Doppler studies on 2022 demonstrate further interval improvement in cerebral vasospasm.    LABS:  Lab Results   Component Value Date    WBC 18.31 (H) 2022    HGB 9.3 (L) 2022    HCT 30.1 (L) 2022    MCV 81.1 " 08/06/2022     08/06/2022     Lab Results   Component Value Date    GLUCOSE 100 (H) 08/06/2022    CALCIUM 7.9 (L) 08/06/2022     08/06/2022    K 4.0 08/06/2022    CO2 28.0 08/06/2022     08/06/2022    BUN 17 08/06/2022    CREATININE 0.53 (L) 08/06/2022    EGFRIFAFRI >60 02/11/2019    EGFRIFNONA 110 08/23/2017    BCR 32.1 (H) 08/06/2022    ANIONGAP 8.0 08/06/2022       CURRENT MEDS:  Current Facility-Administered Medications   Medication Dose Route Frequency Provider Last Rate Last Admin   • acetaminophen (TYLENOL) tablet 325 mg  325 mg Oral Daily Duran Willis MD   325 mg at 08/06/22 0819   • acetaminophen (TYLENOL) tablet 650 mg  650 mg Oral Q4H PRN Duran Willis MD   650 mg at 08/01/22 0356   • aspirin chewable tablet 81 mg  81 mg Oral Daily Duran Willis MD   81 mg at 08/06/22 0818   • polyethylene glycol (MIRALAX) packet 17 g  17 g Oral Daily Case, Mary V., DO   17 g at 08/06/22 0818    And   • sennosides-docusate (PERICOLACE) 8.6-50 MG per tablet 2 tablet  2 tablet Oral BID Case, Mary V., DO   2 tablet at 08/06/22 0818    And   • bisacodyl (DULCOLAX) EC tablet 5 mg  5 mg Oral Daily PRN Case, Mary V., DO   5 mg at 08/05/22 0831    And   • bisacodyl (DULCOLAX) suppository 10 mg  10 mg Rectal Daily PRN Case, Mary V., DO   10 mg at 08/05/22 1220   • busPIRone (BUSPAR) tablet 10 mg  10 mg Oral TID PRN Case, Mary V., DO   10 mg at 08/06/22 0828   • dextrose (D50W) (25 g/50 mL) IV injection 25 g  25 g Intravenous Q15 Min PRN Laisha Siddiqui PA-C       • dextrose (GLUTOSE) oral gel 15 g  15 g Oral Q15 Min PRN Laisha Siddiqui PA-C       • enalaprilat (VASOTEC) injection 1.25 mg  1.25 mg Intravenous Q6H PRN Duran Willis MD   1.25 mg at 07/29/22 2112   • glucagon (human recombinant) (GLUCAGEN DIAGNOSTIC) injection 1 mg  1 mg Intramuscular Q15 Min PRN Laisha Siddiqui PA-C       • hydrALAZINE (APRESOLINE) injection 10 mg  10 mg Intravenous  Q4H PRN Duran Willis MD   10 mg at 07/30/22 0040   • HYDROmorphone (DILAUDID) injection 1 mg  1 mg Intravenous Q4H PRN Duran Willis MD   1 mg at 08/05/22 2359   • insulin detemir (LEVEMIR) injection 5 Units  5 Units Subcutaneous Q12H Case, Mary V., DO   5 Units at 08/06/22 0823   • Insulin Lispro (humaLOG) injection 0-7 Units  0-7 Units Subcutaneous TID AC Laisha Siddiqui PA-C   2 Units at 08/04/22 1716   • labetalol (NORMODYNE,TRANDATE) injection 10 mg  10 mg Intravenous Q10 Min PRN Duran Willis MD       • levETIRAcetam (KEPPRA) tablet 500 mg  500 mg Oral BID Duran Willis MD   500 mg at 08/06/22 0819   • lisinopril (PRINIVIL,ZESTRIL) tablet 10 mg  10 mg Oral Q24H Case, Mary V., DO   10 mg at 08/06/22 0819   • Magnesium Sulfate 2 gram Bolus, followed by 8 gram infusion (total Mg dose 10 grams)- Mg less than or equal to 1mg/dL  2 g Intravenous PRN Laisha Siddiqui PA-C        Or   • Magnesium Sulfate 2 gram / 50mL Infusion (GIVE X 3 BAGS TO EQUAL 6GM TOTAL DOSE) - Mg 1.1 - 1.5 mg/dl  2 g Intravenous PRN Laisha Siddiqui PA-C        Or   • Magnesium Sulfate 4 gram infusion- Mg 1.6-1.9 mg/dL  4 g Intravenous PRN Laisha Siddiqui PA-C 25 mL/hr at 08/06/22 0843 4 g at 08/06/22 0843   • niCARdipine (CARDENE) 20 mg in 200 mL NS infusion  5-15 mg/hr Intravenous Titrated Duran Willis MD   Held at 07/31/22 1439   • nicotine (NICODERM CQ) 21 MG/24HR patch 1 patch  1 patch Transdermal Q24H Laisha Siddiqui PA-C   1 patch at 08/06/22 0820   • niMODipine (NIMOTOP) capsule 60 mg  60 mg Oral Q4H Duran Willis MD   60 mg at 08/06/22 1009   • ondansetron (ZOFRAN) injection 4 mg  4 mg Intravenous Q6H PRN Duran Willis MD   4 mg at 08/01/22 1223   • oxyCODONE-acetaminophen (PERCOCET) 7.5-325 MG per tablet 1 tablet  1 tablet Oral Q4H PRN Duran Willis MD   1 tablet at 08/06/22 1009   • pantoprazole (PROTONIX) EC tablet 40 mg  40  mg Oral Q AM Case, Mary V., DO   40 mg at 08/06/22 0515   • simethicone (MYLICON) chewable tablet 80 mg  80 mg Oral 4x Daily PRN Case, Mary V., DO   80 mg at 08/04/22 1947   • sodium chloride 1.5% (HYPERTONIC) 500 mL infusion  100 mL/hr Intravenous Continuous Spencer Hauser MD       • sodium chloride tablet 2 g  2 g Oral TID With Meals Laisha Siddiqui PA-C   2 g at 08/06/22 0820   • ticagrelor (BRILINTA) tablet 90 mg  90 mg Oral BID Duran Willis MD   90 mg at 08/06/22 0818       PHYSICAL EXAM:  Vitals:    08/06/22 1100   BP: 149/76   Pulse: 58   Resp:    Temp:    SpO2: 97%      Alert and oriented x3.  Resting comfortably in bed.  Still has mild-moderate headache, but this is gradually improving.  No facial droop or pronator drift.  Speech is clear.  Tolerating p.o. intake very well.    ASSESSMENT/PLAN:  Ms. Celis is a 46 y.o. 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  She is now SAH day #14, and her TCD's have been trending down over the past couple of days or so.  I suspect that she is effectively exiting the vasospasm window, and remains neurologically intact.  We will switch her from 3% to 1.5% normal saline, and see if she can maintain her sodium level.  We will also D/C her radial arterial line.  She will need to remain on Brilinta/aspirin dual antiplatelet regimen for the next several months or so, and will need to complete a full 21-day course of Nimotop therapy.    If her sodium remains stable and she remains asymptomatic, we will try to switch her to normal saline tomorrow, with anticipation of possible discharge early next week.  I discussed these extensively with the patient and family at the bedside, and they expressed an understanding and were in agreement with this treatment plan.                  Electronically signed by Spencer Hauser MD at 08/06/22 [id number]

## 2022-08-08 NOTE — PLAN OF CARE
Goal Outcome Evaluation:  Plan of Care Reviewed With: (P) patient        Progress: (P) improving  Outcome Evaluation: (P) Pt w/ less anxiety and pleasant, very cooperative w/ PT. Pt w/ improvements; pt ambulated 600 ft SBA w/ R UE holding IV pole. Pt needed min cueing for upright posture. Pt w/ min fatigue at conclusion of treatment. Pt will continue to benefit from IPPT. PT continue to rec d/c home w/ assist pending medical status.

## 2022-08-09 LAB
ALBUMIN SERPL-MCNC: 3.2 G/DL (ref 3.5–5.2)
ALBUMIN/GLOB SERPL: 1.2 G/DL
ALP SERPL-CCNC: 65 U/L (ref 39–117)
ALT SERPL W P-5'-P-CCNC: 18 U/L (ref 1–33)
ANION GAP SERPL CALCULATED.3IONS-SCNC: 8 MMOL/L (ref 5–15)
AST SERPL-CCNC: 15 U/L (ref 1–32)
BASOPHILS # BLD AUTO: 0.02 10*3/MM3 (ref 0–0.2)
BASOPHILS NFR BLD AUTO: 0.2 % (ref 0–1.5)
BILIRUB SERPL-MCNC: <0.2 MG/DL (ref 0–1.2)
BUN SERPL-MCNC: 13 MG/DL (ref 6–20)
BUN/CREAT SERPL: 28.9 (ref 7–25)
CALCIUM SPEC-SCNC: 8.4 MG/DL (ref 8.6–10.5)
CHLORIDE SERPL-SCNC: 105 MMOL/L (ref 98–107)
CO2 SERPL-SCNC: 27 MMOL/L (ref 22–29)
CREAT SERPL-MCNC: 0.45 MG/DL (ref 0.57–1)
DEPRECATED RDW RBC AUTO: 47.8 FL (ref 37–54)
EGFRCR SERPLBLD CKD-EPI 2021: 120.3 ML/MIN/1.73
EOSINOPHIL # BLD AUTO: 0.21 10*3/MM3 (ref 0–0.4)
EOSINOPHIL NFR BLD AUTO: 1.7 % (ref 0.3–6.2)
ERYTHROCYTE [DISTWIDTH] IN BLOOD BY AUTOMATED COUNT: 15.9 % (ref 12.3–15.4)
GLOBULIN UR ELPH-MCNC: 2.6 GM/DL
GLUCOSE BLDC GLUCOMTR-MCNC: 122 MG/DL (ref 70–130)
GLUCOSE BLDC GLUCOMTR-MCNC: 124 MG/DL (ref 70–130)
GLUCOSE BLDC GLUCOMTR-MCNC: 134 MG/DL (ref 70–130)
GLUCOSE BLDC GLUCOMTR-MCNC: 164 MG/DL (ref 70–130)
GLUCOSE SERPL-MCNC: 117 MG/DL (ref 65–99)
HCT VFR BLD AUTO: 28.9 % (ref 34–46.6)
HGB BLD-MCNC: 8.9 G/DL (ref 12–15.9)
IMM GRANULOCYTES # BLD AUTO: 0.16 10*3/MM3 (ref 0–0.05)
IMM GRANULOCYTES NFR BLD AUTO: 1.3 % (ref 0–0.5)
LYMPHOCYTES # BLD AUTO: 1.72 10*3/MM3 (ref 0.7–3.1)
LYMPHOCYTES NFR BLD AUTO: 14.3 % (ref 19.6–45.3)
MAGNESIUM SERPL-MCNC: 1.7 MG/DL (ref 1.6–2.6)
MCH RBC QN AUTO: 25.1 PG (ref 26.6–33)
MCHC RBC AUTO-ENTMCNC: 30.8 G/DL (ref 31.5–35.7)
MCV RBC AUTO: 81.4 FL (ref 79–97)
MONOCYTES # BLD AUTO: 1.32 10*3/MM3 (ref 0.1–0.9)
MONOCYTES NFR BLD AUTO: 11 % (ref 5–12)
NEUTROPHILS NFR BLD AUTO: 71.5 % (ref 42.7–76)
NEUTROPHILS NFR BLD AUTO: 8.58 10*3/MM3 (ref 1.7–7)
NRBC BLD AUTO-RTO: 0 /100 WBC (ref 0–0.2)
PHOSPHATE SERPL-MCNC: 3.9 MG/DL (ref 2.5–4.5)
PLATELET # BLD AUTO: 392 10*3/MM3 (ref 140–450)
PMV BLD AUTO: 9.8 FL (ref 6–12)
POTASSIUM SERPL-SCNC: 3.9 MMOL/L (ref 3.5–5.2)
PROT SERPL-MCNC: 5.8 G/DL (ref 6–8.5)
RBC # BLD AUTO: 3.55 10*6/MM3 (ref 3.77–5.28)
SODIUM SERPL-SCNC: 140 MMOL/L (ref 136–145)
SODIUM SERPL-SCNC: 142 MMOL/L (ref 136–145)
WBC NRBC COR # BLD: 12.01 10*3/MM3 (ref 3.4–10.8)

## 2022-08-09 PROCEDURE — 99232 SBSQ HOSP IP/OBS MODERATE 35: CPT | Performed by: INTERNAL MEDICINE

## 2022-08-09 PROCEDURE — 0 MAGNESIUM SULFATE 4 GM/100ML SOLUTION: Performed by: INTERNAL MEDICINE

## 2022-08-09 PROCEDURE — 25010000002 CEFTRIAXONE PER 250 MG: Performed by: NURSE PRACTITIONER

## 2022-08-09 PROCEDURE — 84295 ASSAY OF SERUM SODIUM: CPT | Performed by: NURSE PRACTITIONER

## 2022-08-09 PROCEDURE — 63710000001 INSULIN LISPRO (HUMAN) PER 5 UNITS: Performed by: PHYSICIAN ASSISTANT

## 2022-08-09 PROCEDURE — 85025 COMPLETE CBC W/AUTO DIFF WBC: CPT | Performed by: INTERNAL MEDICINE

## 2022-08-09 PROCEDURE — 80053 COMPREHEN METABOLIC PANEL: CPT | Performed by: INTERNAL MEDICINE

## 2022-08-09 PROCEDURE — 82962 GLUCOSE BLOOD TEST: CPT

## 2022-08-09 PROCEDURE — 84100 ASSAY OF PHOSPHORUS: CPT | Performed by: INTERNAL MEDICINE

## 2022-08-09 PROCEDURE — 83735 ASSAY OF MAGNESIUM: CPT | Performed by: NEUROLOGICAL SURGERY

## 2022-08-09 RX ADMIN — BUSPIRONE HYDROCHLORIDE 10 MG: 10 TABLET ORAL at 20:01

## 2022-08-09 RX ADMIN — OXYCODONE HYDROCHLORIDE AND ACETAMINOPHEN 1 TABLET: 7.5; 325 TABLET ORAL at 10:51

## 2022-08-09 RX ADMIN — POLYETHYLENE GLYCOL 3350 17 G: 17 POWDER, FOR SOLUTION ORAL at 08:34

## 2022-08-09 RX ADMIN — VASOPRESSIN 50 ML/HR: 20 INJECTION, SOLUTION INTRAVENOUS at 16:06

## 2022-08-09 RX ADMIN — FLUDROCORTISONE ACETATE 100 MCG: 0.1 TABLET ORAL at 20:01

## 2022-08-09 RX ADMIN — ACETAMINOPHEN 325MG 325 MG: 325 TABLET ORAL at 08:35

## 2022-08-09 RX ADMIN — ASPIRIN 81 MG CHEWABLE TABLET 81 MG: 81 TABLET CHEWABLE at 08:35

## 2022-08-09 RX ADMIN — NIMODIPINE 60 MG: 30 CAPSULE, LIQUID FILLED ORAL at 05:47

## 2022-08-09 RX ADMIN — OXYCODONE HYDROCHLORIDE AND ACETAMINOPHEN 1 TABLET: 7.5; 325 TABLET ORAL at 14:35

## 2022-08-09 RX ADMIN — SODIUM CHLORIDE 1 G: 900 INJECTION INTRAVENOUS at 20:00

## 2022-08-09 RX ADMIN — CLONAZEPAM 0.5 MG: 0.5 TABLET ORAL at 02:06

## 2022-08-09 RX ADMIN — SODIUM CHLORIDE 2 G: 1 TABLET ORAL at 18:57

## 2022-08-09 RX ADMIN — PANTOPRAZOLE SODIUM 40 MG: 40 TABLET, DELAYED RELEASE ORAL at 05:46

## 2022-08-09 RX ADMIN — OXYCODONE HYDROCHLORIDE AND ACETAMINOPHEN 1 TABLET: 7.5; 325 TABLET ORAL at 18:49

## 2022-08-09 RX ADMIN — OXYCODONE HYDROCHLORIDE AND ACETAMINOPHEN 1 TABLET: 7.5; 325 TABLET ORAL at 23:54

## 2022-08-09 RX ADMIN — INSULIN LISPRO 2 UNITS: 100 INJECTION, SOLUTION INTRAVENOUS; SUBCUTANEOUS at 18:31

## 2022-08-09 RX ADMIN — Medication 1 PATCH: at 08:39

## 2022-08-09 RX ADMIN — LEVETIRACETAM 500 MG: 500 TABLET, FILM COATED ORAL at 08:35

## 2022-08-09 RX ADMIN — SENNOSIDES AND DOCUSATE SODIUM 2 TABLET: 50; 8.6 TABLET ORAL at 08:35

## 2022-08-09 RX ADMIN — NIMODIPINE 60 MG: 30 CAPSULE, LIQUID FILLED ORAL at 14:30

## 2022-08-09 RX ADMIN — SODIUM CHLORIDE 2 G: 1 TABLET ORAL at 14:30

## 2022-08-09 RX ADMIN — OXYCODONE HYDROCHLORIDE AND ACETAMINOPHEN 1 TABLET: 7.5; 325 TABLET ORAL at 02:06

## 2022-08-09 RX ADMIN — NIMODIPINE 60 MG: 30 CAPSULE, LIQUID FILLED ORAL at 18:31

## 2022-08-09 RX ADMIN — NIMODIPINE 60 MG: 30 CAPSULE, LIQUID FILLED ORAL at 21:39

## 2022-08-09 RX ADMIN — TICAGRELOR 90 MG: 90 TABLET ORAL at 08:35

## 2022-08-09 RX ADMIN — CLONAZEPAM 0.5 MG: 0.5 TABLET ORAL at 14:35

## 2022-08-09 RX ADMIN — TICAGRELOR 90 MG: 90 TABLET ORAL at 20:01

## 2022-08-09 RX ADMIN — NIMODIPINE 60 MG: 30 CAPSULE, LIQUID FILLED ORAL at 10:51

## 2022-08-09 RX ADMIN — BUSPIRONE HYDROCHLORIDE 10 MG: 10 TABLET ORAL at 12:19

## 2022-08-09 RX ADMIN — LEVETIRACETAM 500 MG: 500 TABLET, FILM COATED ORAL at 20:01

## 2022-08-09 RX ADMIN — SODIUM CHLORIDE 2 G: 1 TABLET ORAL at 08:34

## 2022-08-09 RX ADMIN — VASOPRESSIN 50 ML/HR: 20 INJECTION, SOLUTION INTRAVENOUS at 05:55

## 2022-08-09 RX ADMIN — VASOPRESSIN 100 ML/HR: 20 INJECTION, SOLUTION INTRAVENOUS at 00:20

## 2022-08-09 RX ADMIN — SENNOSIDES AND DOCUSATE SODIUM 2 TABLET: 50; 8.6 TABLET ORAL at 20:01

## 2022-08-09 RX ADMIN — LISINOPRIL 10 MG: 10 TABLET ORAL at 08:35

## 2022-08-09 RX ADMIN — FLUDROCORTISONE ACETATE 100 MCG: 0.1 TABLET ORAL at 08:34

## 2022-08-09 RX ADMIN — NIMODIPINE 60 MG: 30 CAPSULE, LIQUID FILLED ORAL at 02:06

## 2022-08-09 RX ADMIN — MAGNESIUM SULFATE HEPTAHYDRATE 4 G: 40 INJECTION, SOLUTION INTRAVENOUS at 07:00

## 2022-08-09 RX ADMIN — BUSPIRONE HYDROCHLORIDE 10 MG: 10 TABLET ORAL at 05:47

## 2022-08-09 RX ADMIN — OXYCODONE HYDROCHLORIDE AND ACETAMINOPHEN 1 TABLET: 7.5; 325 TABLET ORAL at 05:47

## 2022-08-09 NOTE — PROGRESS NOTES
"Clinical Nutrition Note      Patient Name: Mary Celis  MRN: [id number]  Admission date: 7/25/2022      Multidisciplinary Rounds    Additional information obtained during MDR:  RN reports pt is doing pretty good this morning: she has a slight HA, eating, drinking,  moving around fine, Na+ increased to 140, hypertonic saline reduced, florinef and salt tabs ordered. TCDs improving pt looks to be exiting vasospasm window.   ALl she wants to know is when she can go home.    Current diet: Diet Regular    Oral Nutrition Supplement:     Pertinent medical data reviewed:  No nutrition risk identified on nursing screen; MST score \"0\"    Average oral intake per documentation:  80% of 9 meals over 5 days reviewed         Intervention:  Plan of care and goals of care reviewed    Monitor:  RD to follow per protocol      Sena Toribio MS,RD,LD  08/09/22 17:42 EDT  Time: 15  mins       "

## 2022-08-09 NOTE — PLAN OF CARE
Goal Outcome Evaluation:  Plan of Care Reviewed With: patient        Progress: no change  Outcome Evaluation: NIH 0. No neuro deficit. Patient ambulates to bathroom. Sodium up to 142, 1.5% sodium decreased to 50 ml/hr. 1850ml UOP. Continues to c/o HA. PRN narcs given.

## 2022-08-09 NOTE — PROGRESS NOTES
INTENSIVIST / PULMONARY FOLLOW UP NOTE     Hospital:  LOS: 15 days   Ms. Mary Celis, 46 y.o. female is followed for:     SAH     Iron deficiency anemia due to chronic blood loss    Essential hypertension    Smoker    Class 1 obesity in adult    Illicit drug use (UDS + meth/amphetamines, opiates, and oxycodone)     Hyperglycemia          SUBJECTIVE   No complaints today    The patient's relevant past medical, surgical, family, and social history were reviewed    Allergies and medications were reviewed    ROS:    Per subjective, all other systems were reviewed and were negative        OBJECTIVE     Vital Sign Min/Max for last 24 hours:  Temp  Min: 98.3 °F (36.8 °C)  Max: 98.8 °F (37.1 °C)   BP  Min: 111/62  Max: 140/71   Pulse  Min: 59  Max: 80   Resp  Min: 16  Max: 20   SpO2  Min: 90 %  Max: 100 %   No data recorded     Physical Exam:  General Appearance:  No acute distress  Eyes:  No scleral icterus or pallor, pupils normal  Ears, Nose, Mouth, Throat:  Atraumatic, oropharynx clear  Neck:  Trachea midline, thyroid normal  Respiratory:  Clear to auscultation bilaterally, normal effort  Cardiovascular:  Regular rate and rhythm, no murmurs, no peripheral edema  Gastrointestinal:  Soft, non-tender, non-distended, no hepatosplenomegaly  Skin:  Normal temperature, no rash  Psychiatric:  No agitation  Neuro:    Interval:  (shift change)  1a. Level of Consciousness: 0-->Alert, keenly responsive  1b. LOC Questions: 0-->Answers both questions correctly  1c. LOC Commands: 0-->Performs both tasks correctly  2. Best Gaze: 0-->Normal  3. Visual: 0-->No visual loss  4. Facial Palsy: 0-->Normal symmetrical movements  5a. Motor Arm, Left: 0-->No drift, limb holds 90 (or 45) degrees for full 10 secs  5b. Motor Arm, Right: 0-->No drift, limb holds 90 (or 45) degrees for full 10 secs  6a. Motor Leg, Left: 0-->No drift, leg holds 30 degree position for full 5 secs  6b. Motor Leg, Right: 0-->No drift, leg holds 30 degree position for  full 5 secs  7. Limb Ataxia: 0-->Absent  8. Sensory: 0-->Normal, no sensory loss  9. Best Language: 0-->No aphasia, normal  10. Dysarthria: 0-->Normal  11. Extinction and Inattention (formerly Neglect): 0-->No abnormality    Total (NIH Stroke Scale): 0      Telemetry:              Hemodynamics:   CVP:     PAP:     PAOP:     CO:     CI:     SVI:     SVR:       SpO2: 96 % SpO2  Min: 90 %  Max: 100 %   Device:      Flow Rate:   No data recorded     Mechanical Ventilator Settings:                                         Intake/Ouptut 24 hrs (7:00AM - 6:59 AM)  Intake & Output (last 3 days)       08/06 0701 08/07 0700 08/07 0701 08/08 0700 08/08 0701 08/09 0700 08/09 0701  08/10 0700    P.O. 1320 350 750 360    I.V. (mL/kg) 1472 (14.4) 3864.5 (37.9) 1999.2 (19.6) 347.3 (3.4)    IV Piggyback  100 100     Total Intake(mL/kg) 2792 (27.4) 4314.5 (42.3) 2849.2 (27.9) 707.3 (6.9)    Urine (mL/kg/hr) 7430 (3) 4100 (1.7) 3250 (1.3) 800 (1.3)    Stool 0       Total Output 7430 4100 3250 800    Net -4638 +214.5 -400.8 -92.7            Urine Unmeasured Occurrence  101 x 1 x     Stool Unmeasured Occurrence 1 x             Lines, Drains & Airways     Active LDAs     Name Placement date Placement time Site Days    PICC Triple Lumen 07/30/22 Right Basilic 07/30/22  1138  Basilic  7    Urethral Catheter 07/31/22  1100  -- 6    Arterial Line 07/31/22 Left Radial 07/31/22  1034  created via procedure documentation  Radial  6                Hematology:  Results from last 7 days   Lab Units 08/09/22  0546 08/07/22  0428 08/06/22  0512   WBC 10*3/mm3 12.01* 17.63* 18.31*   HEMOGLOBIN g/dL 8.9* 9.9* 9.3*   HEMATOCRIT % 28.9* 32.3* 30.1*   PLATELETS 10*3/mm3 392 400 415     Electrolytes, Magnesium and Phosphorus:  Results from last 7 days   Lab Units 08/09/22  0546 08/08/22  1843 08/08/22  0611 08/07/22  1816 08/07/22  0428 08/07/22  0003 08/06/22  1126 08/06/22  0512 08/05/22  0608 08/04/22  2329 08/04/22  0530 08/03/22  2358  08/03/22  1210 08/03/22  0505   SODIUM mmol/L 140 142 133* 138 135* 135* 137 138   < > 140  140   < > 140  140   < > 140  140   CHLORIDE mmol/L 105  --  96*  --  98  --   --  102  --  105  --  107  --  106   POTASSIUM mmol/L 3.9  --  4.1  --  3.8  --   --  4.0  --  4.6  --  4.2  --  4.2   CO2 mmol/L 27.0  --  28.0  --  31.0*  --   --  28.0  --  27.0  --  23.0  --  25.0   MAGNESIUM mg/dL 1.7  --  2.0  --  1.8  --   --  1.6  --  1.7  --  1.8  --  1.7   PHOSPHORUS mg/dL 3.9  --  4.5  --  4.6*  --   --   --   --   --   --   --   --   --     < > = values in this interval not displayed.     Renal:  Results from last 7 days   Lab Units 08/09/22  0546 08/08/22  0611 08/07/22  0428 08/06/22  0512 08/04/22  2329 08/03/22  2358 08/03/22  0505   CREATININE mg/dL 0.45* 0.54* 0.45* 0.53* 0.40* 0.56* 0.48*   BUN mg/dL 13 14 16 17 13 14 13     Estimated Creatinine Clearance: 191.4 mL/min (A) (by C-G formula based on SCr of 0.45 mg/dL (L)).  Hepatic:  Results from last 7 days   Lab Units 08/09/22  0546 08/07/22  0428   ALK PHOS U/L 65 59   BILIRUBIN mg/dL <0.2 0.3   ALT (SGPT) U/L 18 16   AST (SGOT) U/L 15 15     Arterial Blood Gases:              No results found for: LACTATE    Relevant imaging studies and labs from 08/09/22 were reviewed    Medications (drips):  sodium chloride 1.5% (HYPERTONIC) 500 mL infusion, Last Rate: 50 mL/hr (08/09/22 0555)        acetaminophen, 325 mg, Oral, Daily  aspirin, 81 mg, Oral, Daily  cefTRIAXone, 1 g, Intravenous, Q24H  fludrocortisone, 100 mcg, Oral, Q12H  insulin lispro, 0-7 Units, Subcutaneous, TID AC  levETIRAcetam, 500 mg, Oral, BID  lisinopril, 10 mg, Oral, Q24H  nicotine, 1 patch, Transdermal, Q24H  niMODipine, 60 mg, Oral, Q4H  pantoprazole, 40 mg, Oral, Q AM  polyethylene glycol, 17 g, Oral, Daily   And  senna-docusate sodium, 2 tablet, Oral, BID  sodium chloride, 2 g, Oral, TID With Meals  ticagrelor, 90 mg, Oral, BID        acetaminophen  •  senna-docusate sodium **AND**  polyethylene glycol **AND** bisacodyl **AND** bisacodyl  •  busPIRone  •  clonazePAM  •  dextrose  •  dextrose  •  enalaprilat  •  glucagon (human recombinant)  •  hydrALAZINE  •  labetalol  •  magnesium sulfate **OR** magnesium sulfate **OR** magnesium sulfate  •  ondansetron  •  oxyCODONE-acetaminophen  •  potassium chloride **OR** potassium chloride **OR** potassium chloride  •  potassium phosphate infusion greater than 15 mMoles **OR** potassium phosphate infusion greater than 15 mMoles **OR** potassium phosphate **OR** sodium phosphate IVPB **OR** sodium phosphate IVPB **OR** sodium phosphate IVPB  •  simethicone    Assessment & Plan   IMPRESSION / PLAN     Inpatient Problem List:  46 y.o.female:  Active Hospital Problems    Diagnosis    • **SAH     • Hyperglycemia    • Smoker    • Class 1 obesity in adult    • Illicit drug use (UDS + meth/amphetamines, opiates, and oxycodone)     • Essential hypertension    • Iron deficiency anemia due to chronic blood loss         Hospital Course:  Ms. Celis is a 45yo F who presented to [facility] for headache. Imaging revealed a large subarachnoid hemorrhage with intraventricular involvement but no evidence of aneurysm and was transferred to [facility] on 7/25/22 for neurosurgical evaluation. Cerebral angiogram on 7/26 was negative for AVM or aneurysm. UDS was positive for methamphetamines and opiates. She was started on Nimotop, Keppra and daily TCD. 3% Saline was started.      She was taken back to the cath lab by Dr. 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      TCDs show improving velocities overall.     LUE duplex from 8/2/22 shows a superficial thrombophlebitis.    Impression:  No major changes.    Plan:    SAH  Per NS.  She was changed from 3% to 1.5% saline on 8/6/22, remains on ASA / Brilinta, 21 days of Nimotop.  Day#17, exiting vasospasm window.  Steroids / keppra also per  NS.    Anemia  Leukocytosis  monitor    Hypomagnesemia  Replace prn    Hyperglycemia  Improved, no longer on steroids, d/cd levemir, continue correction scale    Possible UTI  Rocephin started 8/8, d/c if culture neg    DVT Prophylaxis  SCDs    D/cd Mart[facility]nez 8/7/22    Ok for tele from my standpoint when ok with Neuro    Nutrition  Dietary Orders (From admission, onward)     Start     Ordered    08/07/22 1800  DIET MESSAGE Please send pepperoni pizza and a salad with ranch please. Thank you!  Daily With Dinner      Comments: Please send pepperoni pizza and a salad with ranch please. Thank you!    08/07/22 1428    08/06/22 1408  DIET MESSAGE Please send pepperoni pizza and salad with ranch for dinner. Thank you!  Once        Comments: Please send pepperoni pizza and salad with ranch for dinner. Thank you!    08/06/22 1407    07/31/22 1430  Diet Regular  Diet Effective Now        Question:  Diet Texture / Consistency  Answer:  Regular    07/31/22 1430              Plan of care and goals reviewed with multidisciplinary team at daily rounds         Ethan Burrows MD  Intensive Care Medicine  08/09/22 13:04 EDT

## 2022-08-10 LAB
ALBUMIN SERPL-MCNC: 3.3 G/DL (ref 3.5–5.2)
ALBUMIN/GLOB SERPL: 1.4 G/DL
ALP SERPL-CCNC: 70 U/L (ref 39–117)
ALT SERPL W P-5'-P-CCNC: 19 U/L (ref 1–33)
ANION GAP SERPL CALCULATED.3IONS-SCNC: 10 MMOL/L (ref 5–15)
AST SERPL-CCNC: 16 U/L (ref 1–32)
BACTERIA SPEC AEROBE CULT: ABNORMAL
BASOPHILS # BLD AUTO: 0.01 10*3/MM3 (ref 0–0.2)
BASOPHILS NFR BLD AUTO: 0.1 % (ref 0–1.5)
BILIRUB SERPL-MCNC: <0.2 MG/DL (ref 0–1.2)
BUN SERPL-MCNC: 12 MG/DL (ref 6–20)
BUN/CREAT SERPL: 24 (ref 7–25)
CALCIUM SPEC-SCNC: 8.4 MG/DL (ref 8.6–10.5)
CHLORIDE SERPL-SCNC: 105 MMOL/L (ref 98–107)
CO2 SERPL-SCNC: 26 MMOL/L (ref 22–29)
CREAT SERPL-MCNC: 0.5 MG/DL (ref 0.57–1)
DEPRECATED RDW RBC AUTO: 48.4 FL (ref 37–54)
EGFRCR SERPLBLD CKD-EPI 2021: 117.3 ML/MIN/1.73
EOSINOPHIL # BLD AUTO: 0.24 10*3/MM3 (ref 0–0.4)
EOSINOPHIL NFR BLD AUTO: 2.6 % (ref 0.3–6.2)
ERYTHROCYTE [DISTWIDTH] IN BLOOD BY AUTOMATED COUNT: 15.9 % (ref 12.3–15.4)
GLOBULIN UR ELPH-MCNC: 2.3 GM/DL
GLUCOSE BLDC GLUCOMTR-MCNC: 150 MG/DL (ref 70–130)
GLUCOSE BLDC GLUCOMTR-MCNC: 208 MG/DL (ref 70–130)
GLUCOSE SERPL-MCNC: 151 MG/DL (ref 65–99)
HCT VFR BLD AUTO: 29.3 % (ref 34–46.6)
HGB BLD-MCNC: 8.5 G/DL (ref 12–15.9)
IMM GRANULOCYTES # BLD AUTO: 0.11 10*3/MM3 (ref 0–0.05)
IMM GRANULOCYTES NFR BLD AUTO: 1.2 % (ref 0–0.5)
LYMPHOCYTES # BLD AUTO: 1.49 10*3/MM3 (ref 0.7–3.1)
LYMPHOCYTES NFR BLD AUTO: 15.9 % (ref 19.6–45.3)
MAGNESIUM SERPL-MCNC: 1.7 MG/DL (ref 1.6–2.6)
MCH RBC QN AUTO: 24.6 PG (ref 26.6–33)
MCHC RBC AUTO-ENTMCNC: 29 G/DL (ref 31.5–35.7)
MCV RBC AUTO: 84.7 FL (ref 79–97)
MONOCYTES # BLD AUTO: 1.08 10*3/MM3 (ref 0.1–0.9)
MONOCYTES NFR BLD AUTO: 11.5 % (ref 5–12)
NEUTROPHILS NFR BLD AUTO: 6.46 10*3/MM3 (ref 1.7–7)
NEUTROPHILS NFR BLD AUTO: 68.7 % (ref 42.7–76)
NRBC BLD AUTO-RTO: 0 /100 WBC (ref 0–0.2)
PHOSPHATE SERPL-MCNC: 3.5 MG/DL (ref 2.5–4.5)
PLATELET # BLD AUTO: 365 10*3/MM3 (ref 140–450)
PMV BLD AUTO: 9.8 FL (ref 6–12)
POTASSIUM SERPL-SCNC: 3.5 MMOL/L (ref 3.5–5.2)
POTASSIUM SERPL-SCNC: 4.5 MMOL/L (ref 3.5–5.2)
PROT SERPL-MCNC: 5.6 G/DL (ref 6–8.5)
RBC # BLD AUTO: 3.46 10*6/MM3 (ref 3.77–5.28)
SODIUM SERPL-SCNC: 141 MMOL/L (ref 136–145)
SODIUM SERPL-SCNC: 142 MMOL/L (ref 136–145)
WBC NRBC COR # BLD: 9.39 10*3/MM3 (ref 3.4–10.8)

## 2022-08-10 PROCEDURE — 97530 THERAPEUTIC ACTIVITIES: CPT

## 2022-08-10 PROCEDURE — 80053 COMPREHEN METABOLIC PANEL: CPT | Performed by: INTERNAL MEDICINE

## 2022-08-10 PROCEDURE — 85025 COMPLETE CBC W/AUTO DIFF WBC: CPT | Performed by: INTERNAL MEDICINE

## 2022-08-10 PROCEDURE — 82962 GLUCOSE BLOOD TEST: CPT

## 2022-08-10 PROCEDURE — 84132 ASSAY OF SERUM POTASSIUM: CPT | Performed by: INTERNAL MEDICINE

## 2022-08-10 PROCEDURE — 0 MAGNESIUM SULFATE 4 GM/100ML SOLUTION: Performed by: INTERNAL MEDICINE

## 2022-08-10 PROCEDURE — 84295 ASSAY OF SERUM SODIUM: CPT | Performed by: NEUROLOGICAL SURGERY

## 2022-08-10 PROCEDURE — 84100 ASSAY OF PHOSPHORUS: CPT | Performed by: INTERNAL MEDICINE

## 2022-08-10 PROCEDURE — 25010000002 CEFTRIAXONE PER 250 MG: Performed by: NURSE PRACTITIONER

## 2022-08-10 PROCEDURE — 99231 SBSQ HOSP IP/OBS SF/LOW 25: CPT | Performed by: NEUROLOGICAL SURGERY

## 2022-08-10 PROCEDURE — 63710000001 INSULIN LISPRO (HUMAN) PER 5 UNITS: Performed by: PHYSICIAN ASSISTANT

## 2022-08-10 PROCEDURE — 83735 ASSAY OF MAGNESIUM: CPT | Performed by: NEUROLOGICAL SURGERY

## 2022-08-10 PROCEDURE — 99232 SBSQ HOSP IP/OBS MODERATE 35: CPT | Performed by: INTERNAL MEDICINE

## 2022-08-10 RX ADMIN — Medication 1 PATCH: at 08:30

## 2022-08-10 RX ADMIN — BUSPIRONE HYDROCHLORIDE 10 MG: 10 TABLET ORAL at 08:25

## 2022-08-10 RX ADMIN — TICAGRELOR 90 MG: 90 TABLET ORAL at 08:24

## 2022-08-10 RX ADMIN — FLUDROCORTISONE ACETATE 100 MCG: 0.1 TABLET ORAL at 08:24

## 2022-08-10 RX ADMIN — LEVETIRACETAM 500 MG: 500 TABLET, FILM COATED ORAL at 20:36

## 2022-08-10 RX ADMIN — NIMODIPINE 60 MG: 30 CAPSULE, LIQUID FILLED ORAL at 05:28

## 2022-08-10 RX ADMIN — NIMODIPINE 60 MG: 30 CAPSULE, LIQUID FILLED ORAL at 01:53

## 2022-08-10 RX ADMIN — TICAGRELOR 90 MG: 90 TABLET ORAL at 20:36

## 2022-08-10 RX ADMIN — POTASSIUM CHLORIDE 40 MEQ: 750 CAPSULE, EXTENDED RELEASE ORAL at 12:22

## 2022-08-10 RX ADMIN — ACETAMINOPHEN 325MG 650 MG: 325 TABLET ORAL at 11:08

## 2022-08-10 RX ADMIN — OXYCODONE HYDROCHLORIDE AND ACETAMINOPHEN 1 TABLET: 7.5; 325 TABLET ORAL at 17:18

## 2022-08-10 RX ADMIN — LEVETIRACETAM 500 MG: 500 TABLET, FILM COATED ORAL at 08:25

## 2022-08-10 RX ADMIN — SODIUM CHLORIDE 2 G: 1 TABLET ORAL at 08:24

## 2022-08-10 RX ADMIN — CLONAZEPAM 0.5 MG: 0.5 TABLET ORAL at 01:52

## 2022-08-10 RX ADMIN — VASOPRESSIN 50 ML/HR: 20 INJECTION, SOLUTION INTRAVENOUS at 01:56

## 2022-08-10 RX ADMIN — FLUDROCORTISONE ACETATE 100 MCG: 0.1 TABLET ORAL at 20:36

## 2022-08-10 RX ADMIN — SODIUM CHLORIDE 2 G: 1 TABLET ORAL at 13:02

## 2022-08-10 RX ADMIN — NIMODIPINE 60 MG: 30 CAPSULE, LIQUID FILLED ORAL at 11:05

## 2022-08-10 RX ADMIN — PANTOPRAZOLE SODIUM 40 MG: 40 TABLET, DELAYED RELEASE ORAL at 05:28

## 2022-08-10 RX ADMIN — SODIUM CHLORIDE 1 G: 900 INJECTION INTRAVENOUS at 20:36

## 2022-08-10 RX ADMIN — LISINOPRIL 10 MG: 10 TABLET ORAL at 08:25

## 2022-08-10 RX ADMIN — BUSPIRONE HYDROCHLORIDE 10 MG: 10 TABLET ORAL at 17:18

## 2022-08-10 RX ADMIN — INSULIN LISPRO 2 UNITS: 100 INJECTION, SOLUTION INTRAVENOUS; SUBCUTANEOUS at 08:29

## 2022-08-10 RX ADMIN — SENNOSIDES AND DOCUSATE SODIUM 2 TABLET: 50; 8.6 TABLET ORAL at 08:29

## 2022-08-10 RX ADMIN — ASPIRIN 81 MG CHEWABLE TABLET 81 MG: 81 TABLET CHEWABLE at 08:25

## 2022-08-10 RX ADMIN — ACETAMINOPHEN 325MG 325 MG: 325 TABLET ORAL at 08:25

## 2022-08-10 RX ADMIN — INSULIN LISPRO 3 UNITS: 100 INJECTION, SOLUTION INTRAVENOUS; SUBCUTANEOUS at 11:05

## 2022-08-10 RX ADMIN — NIMODIPINE 60 MG: 30 CAPSULE, LIQUID FILLED ORAL at 14:00

## 2022-08-10 RX ADMIN — MAGNESIUM SULFATE HEPTAHYDRATE 4 G: 40 INJECTION, SOLUTION INTRAVENOUS at 08:24

## 2022-08-10 RX ADMIN — SODIUM CHLORIDE 2 G: 1 TABLET ORAL at 17:18

## 2022-08-10 RX ADMIN — SENNOSIDES AND DOCUSATE SODIUM 2 TABLET: 50; 8.6 TABLET ORAL at 20:36

## 2022-08-10 RX ADMIN — OXYCODONE HYDROCHLORIDE AND ACETAMINOPHEN 1 TABLET: 7.5; 325 TABLET ORAL at 08:24

## 2022-08-10 RX ADMIN — OXYCODONE HYDROCHLORIDE AND ACETAMINOPHEN 1 TABLET: 7.5; 325 TABLET ORAL at 21:56

## 2022-08-10 RX ADMIN — POTASSIUM CHLORIDE 40 MEQ: 750 CAPSULE, EXTENDED RELEASE ORAL at 08:24

## 2022-08-10 RX ADMIN — NIMODIPINE 60 MG: 30 CAPSULE, LIQUID FILLED ORAL at 17:18

## 2022-08-10 RX ADMIN — NIMODIPINE 60 MG: 30 CAPSULE, LIQUID FILLED ORAL at 21:56

## 2022-08-10 RX ADMIN — CLONAZEPAM 0.5 MG: 0.5 TABLET ORAL at 20:36

## 2022-08-10 RX ADMIN — OXYCODONE HYDROCHLORIDE AND ACETAMINOPHEN 1 TABLET: 7.5; 325 TABLET ORAL at 12:24

## 2022-08-10 NOTE — THERAPY TREATMENT NOTE
Patient Name: Mary Celis  : 1975    MRN: [id number]                              Today's Date: 8/10/2022       Admit Date: 2022    Visit Dx:     ICD-10-CM ICD-9-CM   1. SAH (subarachnoid hemorrhage) (HCC)  I60.9 430   2. Essential hypertension  I10 401.9   3. Iron malabsorption  K90.9 579.8   4. Right upper quadrant abdominal pain  R10.11 789.01   5. Iron deficiency anemia due to chronic blood loss  D50.0 280.0   6. Acute nonintractable headache, unspecified headache type  R51.9 784.0   7. Cognitive communication deficit  R41.841 799.52     Patient Active Problem List   Diagnosis   • Iron deficiency anemia due to chronic blood loss   • Right upper quadrant abdominal pain   • Iron malabsorption   • SAH    • Essential hypertension   • Smoker   • Class 1 obesity in adult   • Illicit drug use (UDS + meth/amphetamines, opiates, and oxycodone)    • Hyperglycemia     Past Medical History:   Diagnosis Date   • Abnormal Pap smear of cervix    • Anemia    • Arthritis    • Arthritis    • Body piercing     EARS   • Cancer (HCC)     REPORTS PRE-CERVICAL CANCER   • Depression with anxiety    • Gout    • History of blood transfusion    • History of transfusion     REPORTS HAS HAD SEVERAL TRANSFUSIONS, NO REACTIONS   • IBS (irritable bowel syndrome)    • Panic attack    • Tattoo     LEFT FOREARM   • Wears glasses      Past Surgical History:   Procedure Laterality Date   • CEREBRAL ANGIOGRAM N/A 2022    Procedure: Cerebral angiogram;  Surgeon: Spencer Hauser MD;  Location: [facility] CATH INVASIVE LOCATION;  Service: Interventional Radiology;  Laterality: N/A;   • CHOLECYSTECTOMY     • CHOLECYSTECTOMY WITH INTRAOPERATIVE CHOLANGIOGRAM N/A 2017    Procedure: CHOLECYSTECTOMY LAPAROSCOPIC INTRAOPERATIVE CHOLANGIOGRAM;  Surgeon: Albaro Lopez MD;  Location: [facility] OR;  Service:    • COLONOSCOPY N/A 2017    Procedure: COLONOSCOPY;  Surgeon: Albaro Lopez MD;  Location: [facility] ENDOSCOPY;  Service:    • D &  C HYSTEROSCOPY      VITAL SIGNS UNSTABLE WITH THIS PROCEDURE   • ENDOSCOPY N/A 7/28/2017    Procedure: ESOPHAGOGASTRODUODENOSCOPY WITH BIOPSIES;  Surgeon: Albaro Lopez MD;  Location: [facility] ENDOSCOPY;  Service:    • INTERVENTIONAL RADIOLOGY PROCEDURE Bilateral 7/31/2022    Procedure: CAROTID CEREBRAL ANGIOGRAM BILATERAL;  Surgeon: Duran Willis MD;  Location: [facility] INVASIVE LOCATION;  Service: Interventional Radiology;  Laterality: Bilateral;   • TUBAL ABDOMINAL LIGATION     • WISDOM TOOTH EXTRACTION        General Information     Row Name 08/10/22 1346          Physical Therapy Time and Intention    Document Type therapy note (daily note) (P)   -AB     Mode of Treatment physical therapy (P)   -AB     Row Name 08/10/22 1346          General Information    Patient Profile Reviewed yes (P)   -AB     Existing Precautions/Restrictions fall (P)   -AB     Barriers to Rehab medically complex (P)   -AB     Row Name 08/10/22 1346          Cognition    Orientation Status (Cognition) oriented x 4 (P)   -AB     Row Name 08/10/22 1346          Safety Issues, Functional Mobility    Safety Issues Affecting Function (Mobility) safety precaution awareness;safety precautions follow-through/compliance (P)   -AB     Impairments Affecting Function (Mobility) endurance/activity tolerance;pain (P)   -AB           User Key  (r) = Recorded By, (t) = Taken By, (c) = Cosigned By    Initials Name Provider Type    AB Jackie Tirado, PT Student PT Student               Mobility     Row Name 08/10/22 1346          Bed Mobility    Bed Mobility supine-sit (P)   -AB     Supine-Sit [place] (Bed Mobility) standby assist;1 person assist (P)   -AB     Assistive Device (Bed Mobility) bed rails;head of bed elevated (P)   -AB     Comment, (Bed Mobility) Increased time required. (P)   -AB     Row Name 08/10/22 1346          Bed-Chair Transfer    Bed-Chair [place] (Transfers) supervision (P)   -AB     Row Name 08/10/22 1346           Sit-Stand Transfer    Sit-Stand [place] (Transfers) independent (P)   -AB     Comment, (Sit-Stand Transfer) Good stability noted (P)   -AB     Row Name 08/10/22 1346          Gait/Stairs (Locomotion)    [place] Level (Gait) standby assist (P)   -AB     Assistive Device (Gait) other (see comments) (P)   BUE supported on tele monitor.  -AB     Distance in Feet (Gait) 600 (P)   -AB     Deviations/Abnormal Patterns (Gait) bilateral deviations;base of support, wide;stride length decreased;benja decreased (P)   -AB     Comment, (Gait/Stairs) Pt demo's step through gait pattern with decreased benja. Improved stability noted this session with improved safety awareness. (P)   -AB           User Key  (r) = Recorded By, (t) = Taken By, (c) = Cosigned By    Initials Name Provider Type    AB Jackie Tirado PT Student PT Student               Obj/Interventions     Row Name 08/10/22 1349          Motor Skills    Therapeutic Exercise hip;knee;ankle (P)   -AB     Row Name 08/10/22 1349          Hip (Therapeutic Exercise)    Hip (Therapeutic Exercise) strengthening exercise (P)   -AB     Hip Strengthening (Therapeutic Exercise) bilateral;aBduction;aDduction;sitting;10 repetitions (P)   -AB     Row Name 08/10/22 1349          Knee (Therapeutic Exercise)    Knee (Therapeutic Exercise) AROM (active range of motion) (P)   -AB     Knee AROM (Therapeutic Exercise) bilateral;SLR (straight leg raise);10 repetitions;sitting (P)   -AB     Row Name 08/10/22 1349          Ankle (Therapeutic Exercise)    Ankle (Therapeutic Exercise) AROM (active range of motion) (P)   -AB     Ankle AROM (Therapeutic Exercise) bilateral;dorsiflexion;plantarflexion;10 repetitions;sitting (P)   -AB     Row Name 08/10/22 1349          Balance    Balance Assessment sitting static balance;sitting dynamic balance;standing static balance;standing dynamic balance (P)   -AB     Static Sitting Balance independent (P)   -AB     Dynamic Sitting Balance  independent (P)   -AB     Position, Sitting Balance unsupported;sitting edge of bed (P)   -AB     Static Standing Balance independent (P)   -AB     Dynamic Standing Balance standby assist (P)   -AB     Position/Device Used, Standing Balance supported (P)   -AB     Balance Interventions sitting;standing;sit to stand;supported;static (P)   -AB     Comment, Balance No LOB (P)   -AB           User Key  (r) = Recorded By, (t) = Taken By, (c) = Cosigned By    Initials Name Provider Type    Jackie Salter, PT Student PT Student               Goals/Plan    No documentation.                Clinical Impression     Row Name 08/10/22 1350          Pain    Pretreatment Pain Rating 6/10 (P)   -AB     Posttreatment Pain Rating 6/10 (P)   -AB     Pain Location generalized (P)   -AB     Pain Location - abdomen (P)   -AB     Pre/Posttreatment Pain Comment Tolerated (P)   -AB     Additional Documentation Pain Scale: Numbers Pre/Post-Treatment (Group) (P)   -AB     Row Name 08/10/22 1350          Plan of Care Review    Progress improving (P)   -AB     Outcome Evaluation Pt with good progress towards mobility goals. Pt demo's improved stability and balance. Ambulation of 600' with SBA and BUE supported on tele monitor was well tolerated. Cues provided for increasing gait speed. Pt will continue to benefit from skilled IPPT. PT rec d/c home with assist. (P)   -AB     Row Name 08/10/22 1350          Vital Signs    Pre Systolic BP Rehab 118 (P)   -AB     Pre Treatment Diastolic BP 84 (P)   -AB     Post Systolic BP Rehab 131 (P)   -AB     Post Treatment Diastolic BP 74 (P)   -AB     Pretreatment Heart Rate (beats/min) 66 (P)   -AB     Posttreatment Heart Rate (beats/min) 68 (P)   -AB     Pre SpO2 (%) 96 (P)   -AB     O2 Delivery Pre Treatment room air (P)   -AB     O2 Delivery Intra Treatment room air (P)   -AB     Post SpO2 (%) 95 (P)   -AB     O2 Delivery Post Treatment room air (P)   -AB     Pre Patient Position Supine (P)   -AB      Intra Patient Position Standing (P)   -AB     Post Patient Position Sitting (P)   -AB     Row Name 08/10/22 1350          Positioning and Restraints    Pre-Treatment Position in bed (P)   -AB     Post Treatment Position chair (P)   -AB     In Chair notified nsg;reclined;sitting;encouraged to call for assist;call light within reach;exit alarm on;waffle cushion;legs elevated;patient within staff view (P)   -AB           User Key  (r) = Recorded By, (t) = Taken By, (c) = Cosigned By    Initials Name Provider Type    Jackie Salter, PT Student PT Student               Outcome Measures     Row Name 08/10/22 1352          How much help from another person do you currently need...    Turning from your back to your side while in flat bed without using bedrails? 4 (P)   -AB     Moving from lying on back to sitting on the side of a flat bed without bedrails? 4 (P)   -AB     Moving to and from a bed to a chair (including a wheelchair)? 3 (P)   -AB     Standing up from a chair using your arms (e.g., wheelchair, bedside chair)? 4 (P)   -AB     Climbing 3-5 steps with a railing? 3 (P)   -AB     To walk in hospital room? 3 (P)   -AB     AM-PAC 6 Clicks Score (PT) 21 (P)   -AB     Highest level of mobility 6 --> Walked 10 steps or more (P)   -AB           User Key  (r) = Recorded By, (t) = Taken By, (c) = Cosigned By    Initials Name Provider Type    Jackie Salter, PT Student PT Student                             Physical Therapy Education                 Title: PT OT SLP Therapies (In Progress)     Topic: Physical Therapy (Done)     Point: Mobility training (Done)     Learning Progress Summary           Patient Acceptance, E,TB,D, VU,DU,NR by AB at 8/10/2022 1353      Show all documentation for this point (7)                 Point: Home exercise program (Done)     Learning Progress Summary           Patient Acceptance, E,TB,D, VU,DU,NR by AB at 8/10/2022 1353      Show all documentation for this point (2)                  Point: Body mechanics (Done)     Learning Progress Summary           Patient Acceptance, E,TB,D, VU,DU,NR by AB at 8/10/2022 1353      Show all documentation for this point (7)                 Point: Precautions (Done)     Learning Progress Summary           Patient Acceptance, E,TB,D, VU,DU,NR by AB at 8/10/2022 1353      Show all documentation for this point (7)                             User Key     Initials Effective Dates Name Provider Type Discipline    AB 05/23/22 -  Jackie Tirado, PT Student PT Student PT              PT Recommendation and Plan  Planned Therapy Interventions (PT): balance training, bed mobility training, gait training, home exercise program, patient/family education, postural re-education, stair training, strengthening, transfer training  Plan of Care Reviewed With: patient  Progress: (P) improving  Outcome Evaluation: (P) Pt with good progress towards mobility goals. Pt demo's improved stability and balance. Ambulation of 600' with SBA and BUE supported on tele monitor was well tolerated. Cues provided for increasing gait speed. Pt will continue to benefit from skilled IPPT. PT rec d/c home with assist.     Time Calculation:    PT Charges     Row Name 08/10/22 1354             Time Calculation    Start Time 1318 (P)   -AB      PT Received On 08/10/22 (P)   -AB      PT Goal Re-Cert Due Date 08/17/22 (P)   -AB              Time Calculation- PT    Total Timed Code Minutes- PT 23 minute(s) (P)   -AB              Timed Charges    22420 - PT Therapeutic Activity Minutes 23 (P)   -AB              Total Minutes    Timed Charges Total Minutes 23 (P)   -AB       Total Minutes 23 (P)   -AB            User Key  (r) = Recorded By, (t) = Taken By, (c) = Cosigned By    Initials Name Provider Type    AB Jackie Tirado, PT Student PT Student              Therapy Charges for Today     Code Description Service Date Service Provider Modifiers Qty    [id number]  PT THERAPEUTIC ACT EA 15 MIN 8/10/2022  Jackie Tirado, PT Student GP 2          PT G-Codes  Outcome Measure Options: AM-PAC 6 Clicks Basic Mobility (PT)  AM-PAC 6 Clicks Score (PT): (P) 21  AM-PAC 6 Clicks Score (OT): 20  Modified [place] Scale: 1 - No significant disability despite symptoms.  Able to carry out all usual duties and activities.    JACKIE TIRADO, PT Student  8/10/2022

## 2022-08-10 NOTE — PAYOR COMM NOTE
"Jackie Burrows RN, BSN, [facility]  Phone # [phone]  Fax # [phone]  Auth: [id number]  Mary Menezes (46 y.o. Female)             Date of Birth   1975    Social Security Number       Address   [address] [address]    Home Phone   [phone]    MRN   [id number]       [place]   None    Marital Status                               Admission Date   7/25/22    Admission Type   Urgent    Admitting Provider       Attending Provider   Ethan Burrows MD    Department, Room/Bed   [facility] 2B ICU, N236/1       Discharge Date       Discharge Disposition       Discharge Destination                               Attending Provider: Ethan Burrows MD    Allergies: No Known Allergies    Isolation: None   Infection: None   Code Status: CPR   Advance Care Planning Activity    Ht: 170 cm (66.93\")   Wt: 93.3 kg (205 lb 11 oz)    Admission Cmt: None   Principal Problem: SAH  [I60.9]                 Active Insurance as of 7/25/2022     Primary Coverage     Payor Plan Insurance Group Employer/Plan Group    [employer] PPO [id number]     Payor Plan Address Payor Plan Phone Number Payor Plan Fax Number Effective Dates    [address] [phone]  1/1/2015 - None Entered    [place] [address]       Subscriber Name Subscriber Birth Date Member ID       LAYA MENEZES 1975 [id number]                 Emergency Contacts      (Rel.) Home Phone Work Phone Mobile Phone    Colleen Shermanley (Daughter) -- -- [phone]    Lyla Butcher (Mother) [phone] -- [phone]              Current Facility-Administered Medications   Medication Dose Route Frequency Provider Last Rate Last Admin   • acetaminophen (TYLENOL) tablet 325 mg  325 mg Oral Daily Duran Willis MD   325 mg at 08/10/22 0825   • acetaminophen (TYLENOL) tablet 650 mg  650 mg Oral Q4H PRN Duran Willis MD   650 mg at " 08/10/22 1108   • aspirin chewable tablet 81 mg  81 mg Oral Daily Duran Willis MD   81 mg at 08/10/22 0825   • polyethylene glycol (MIRALAX) packet 17 g  17 g Oral Daily Case, Mary V., DO   17 g at 08/09/22 0834    And   • sennosides-docusate (PERICOLACE) 8.6-50 MG per tablet 2 tablet  2 tablet Oral BID Case, Mary V., DO   2 tablet at 08/10/22 0829    And   • bisacodyl (DULCOLAX) EC tablet 5 mg  5 mg Oral Daily PRN Case, Mary V., DO   5 mg at 08/05/22 0831    And   • bisacodyl (DULCOLAX) suppository 10 mg  10 mg Rectal Daily PRN Case, Mary V., DO   10 mg at 08/05/22 1220   • busPIRone (BUSPAR) tablet 10 mg  10 mg Oral TID PRN Case, Mary V., DO   10 mg at 08/10/22 0825   • cefTRIAXone (ROCEPHIN) 1 g/100 mL 0.9% NS (MBP)  1 g Intravenous Q24H Flavio Henriquez, APRN   1 g at 08/09/22 2000   • clonazePAM (KlonoPIN) tablet 0.5 mg  0.5 mg Oral BID PRN Ethan Burrows MD   0.5 mg at 08/10/22 0152   • dextrose (D50W) (25 g/50 mL) IV injection 25 g  25 g Intravenous Q15 Min PRN Laisha Siddiqui PA-C       • dextrose (GLUTOSE) oral gel 15 g  15 g Oral Q15 Min PRN Laisha Siddiqui PA-C       • enalaprilat (VASOTEC) injection 1.25 mg  1.25 mg Intravenous Q6H PRN Duran Willis MD   1.25 mg at 07/29/22 2112   • fludrocortisone tablet 100 mcg  100 mcg Oral Q12H Brannon Barrientos MD   100 mcg at 08/10/22 0824   • glucagon (human recombinant) (GLUCAGEN DIAGNOSTIC) injection 1 mg  1 mg Intramuscular Q15 Min PRN Laisha Siddiqui PA-C       • hydrALAZINE (APRESOLINE) injection 10 mg  10 mg Intravenous Q4H PRN Duran Willis MD   10 mg at 07/30/22 0040   • labetalol (NORMODYNE,TRANDATE) injection 10 mg  10 mg Intravenous Q10 Min PRN Duran Willis MD       • levETIRAcetam (KEPPRA) tablet 500 mg  500 mg Oral BID Duran Willis MD   500 mg at 08/10/22 0825   • lisinopril (PRINIVIL,ZESTRIL) tablet 10 mg  10 mg Oral Q24H Case, Mary V., DO   10 mg at  08/10/22 0825   • Magnesium Sulfate 2 gram Bolus, followed by 8 gram infusion (total Mg dose 10 grams)- Mg less than or equal to 1mg/dL  2 g Intravenous PRN Ethan Burrows MD        Or   • Magnesium Sulfate 2 gram / 50mL Infusion (GIVE X 3 BAGS TO EQUAL 6GM TOTAL DOSE) - Mg 1.1 - 1.5 mg/dl  2 g Intravenous PRN Ethan Burrows MD        Or   • Magnesium Sulfate 4 gram infusion- Mg 1.6-1.9 mg/dL  4 g Intravenous PRN Ethan Burrows MD 25 mL/hr at 08/10/22 0824 4 g at 08/10/22 0824   • nicotine (NICODERM CQ) 21 MG/24HR patch 1 patch  1 patch Transdermal Q24H Laisha Siddiqui PA-C   1 patch at 08/10/22 0830   • niMODipine (NIMOTOP) capsule 60 mg  60 mg Oral Q4H Brannon Barrientos MD   60 mg at 08/10/22 1105   • ondansetron (ZOFRAN) injection 4 mg  4 mg Intravenous Q6H PRN Duran Willis MD   4 mg at 08/01/22 1223   • oxyCODONE-acetaminophen (PERCOCET) 7.5-325 MG per tablet 1 tablet  1 tablet Oral Q4H PRN Judith Valencia APRN   1 tablet at 08/10/22 1224   • pantoprazole (PROTONIX) EC tablet 40 mg  40 mg Oral Q AM Case, Mary V., DO   40 mg at 08/10/22 0528   • potassium chloride (MICRO-K) CR capsule 40 mEq  40 mEq Oral PRN Ethan Burrows MD   40 mEq at 08/10/22 1222    Or   • potassium chloride (KLOR-CON) packet 40 mEq  40 mEq Oral PRN Ethan Burrows MD        Or   • potassium chloride 10 mEq in 100 mL IVPB  10 mEq Intravenous Q1H PRN Ethan Burrows MD       • potassium phosphate 45 mmol in sodium chloride 0.9 % 500 mL infusion  45 mmol Intravenous PRN Ethan Burrows MD        Or   • potassium phosphate 30 mmol in sodium chloride 0.9 % 250 mL infusion  30 mmol Intravenous PRN Ethan Burrows MD        Or   • potassium phosphate 15 mmol in 0.9% sodium chloride 100 mL IVPB  15 mmol Intravenous PRN Ethan Burrows MD        Or   • sodium phosphates 45 mmol in sodium chloride  0.9 % 250 mL IVPB  45 mmol Intravenous PRN Ethan Burrows MD        Or   • sodium phosphates 30 mmol in sodium chloride 0.9 % 250 mL IVPB  30 mmol Intravenous PRN Ethan Burrows MD        Or   • sodium phosphates 15 mmol in sodium chloride 0.9 % 250 mL IVPB  15 mmol Intravenous PRN tEhan Burrows MD       • simethicone (MYLICON) chewable tablet 80 mg  80 mg Oral 4x Daily PRN Mary Batista DO   80 mg at 08/04/22 1947   • sodium chloride tablet 2 g  2 g Oral TID With Meals Laisha Siddiqui PA-C   2 g at 08/10/22 1302   • ticagrelor (BRILINTA) tablet 90 mg  90 mg Oral BID Duran Willis MD   90 mg at 08/10/22 0824     Lab Results (last 48 hours)     Procedure Component Value Units Date/Time    POC Glucose Once [[id number]]  (Abnormal) Collected: 08/10/22 1035    Specimen: Blood Updated: 08/10/22 1036     Glucose 208 mg/dL      Comment: Meter: [id number] : [id number] [facility] [place]       Urine Culture - Urine, Urine, Clean Catch [[id number]]  (Abnormal)  (Susceptibility) Collected: 08/07/22 2313    Specimen: Urine, Clean Catch Updated: 08/10/22 1021     Urine Culture >100,000 CFU/mL Proteus mirabilis    Narrative:      Colonization of the urinary tract without infection is common. Treatment is discouraged unless the patient is symptomatic, pregnant, or undergoing an invasive urologic procedure.    Susceptibility      Proteus mirabilis      DIPAK      Ampicillin Susceptible      Ampicillin + Sulbactam Susceptible      Cefazolin Susceptible      Cefepime Susceptible      Ceftazidime Susceptible      Ceftriaxone Susceptible      Gentamicin Susceptible      Levofloxacin Susceptible      Nitrofurantoin Resistant     Piperacillin + Tazobactam Susceptible      Trimethoprim + Sulfamethoxazole Susceptible                    Linear View                   POC Glucose Once [[id number]]  (Abnormal) Collected: 08/10/22 0657    Specimen: Blood Updated: 08/10/22 0658     Glucose  150 mg/dL      Comment: Meter: [id number] : [id number] Linda Mccurdy       Comprehensive Metabolic Panel [[id number]]  (Abnormal) Collected: 08/10/22 0528    Specimen: Blood Updated: 08/10/22 0633     Glucose 151 mg/dL      BUN 12 mg/dL      Creatinine 0.50 mg/dL      Sodium 141 mmol/L      Potassium 3.5 mmol/L      Comment: Slight hemolysis detected by analyzer. Results may be affected.        Chloride 105 mmol/L      CO2 26.0 mmol/L      Calcium 8.4 mg/dL      Total Protein 5.6 g/dL      Albumin 3.30 g/dL      ALT (SGPT) 19 U/L      AST (SGOT) 16 U/L      Alkaline Phosphatase 70 U/L      Total Bilirubin <0.2 mg/dL      Globulin 2.3 gm/dL      Comment: Calculated Result        A/G Ratio 1.4 g/dL      BUN/Creatinine Ratio 24.0     Anion Gap 10.0 mmol/L      eGFR 117.3 mL/min/1.73      Comment: National Kidney Foundation and American Society of Nephrology (ASN) Task Force recommended calculation based on the Chronic Kidney Disease Epidemiology Collaboration (CKD-EPI) equation refit without adjustment for race.       Narrative:      GFR Normal >60  Chronic Kidney Disease <60  Kidney Failure <15      Phosphorus [[id number]]  (Normal) Collected: 08/10/22 0528    Specimen: Blood Updated: 08/10/22 0632     Phosphorus 3.5 mg/dL     Magnesium [[id number]]  (Normal) Collected: 08/10/22 0528    Specimen: Blood Updated: 08/10/22 0632     Magnesium 1.7 mg/dL     CBC & Differential [[id number]]  (Abnormal) Collected: 08/10/22 0528    Specimen: Blood Updated: 08/10/22 0621    Narrative:      The following orders were created for panel order CBC & Differential.  Procedure                               Abnormality         Status                     ---------                               -----------         ------                     CBC Auto Differential[[id number]]        Abnormal            Final result                 Please view results for these tests on the individual orders.    CBC Auto Differential [[id number]]  (Abnormal)  Collected: 08/10/22 0528    Specimen: Blood Updated: 08/10/22 0621     WBC 9.39 10*3/mm3      RBC 3.46 10*6/mm3      Hemoglobin 8.5 g/dL      Hematocrit 29.3 %      MCV 84.7 fL      MCH 24.6 pg      MCHC 29.0 g/dL      RDW 15.9 %      RDW-SD 48.4 fl      MPV 9.8 fL      Platelets 365 10*3/mm3      Neutrophil % 68.7 %      Lymphocyte % 15.9 %      Monocyte % 11.5 %      Eosinophil % 2.6 %      Basophil % 0.1 %      Immature Grans % 1.2 %      Neutrophils, Absolute 6.46 10*3/mm3      Lymphocytes, Absolute 1.49 10*3/mm3      Monocytes, Absolute 1.08 10*3/mm3      Eosinophils, Absolute 0.24 10*3/mm3      Basophils, Absolute 0.01 10*3/mm3      Immature Grans, Absolute 0.11 10*3/mm3      nRBC 0.0 /100 WBC     POC Glucose Once [[id number]]  (Normal) Collected: 08/09/22 2051    Specimen: Blood Updated: 08/09/22 2051     Glucose 124 mg/dL      Comment: Meter: [id number] : [id number] Romo Sara       Sodium [[id number]]  (Normal) Collected: 08/09/22 1855    Specimen: Blood Updated: 08/09/22 1917     Sodium 142 mmol/L     POC Glucose Once [[id number]]  (Abnormal) Collected: 08/09/22 1647    Specimen: Blood Updated: 08/09/22 1653     Glucose 164 mg/dL      Comment: Meter: [id number] : [id number] [place]       POC Glucose Once [[id number]]  (Abnormal) Collected: 08/09/22 1042    Specimen: Blood Updated: 08/09/22 1043     Glucose 134 mg/dL      Comment: Meter: [id number] : [id number] [place]       POC Glucose Once [[id number]]  (Normal) Collected: 08/09/22 0651    Specimen: Blood Updated: 08/09/22 0653     Glucose 122 mg/dL      Comment: Meter: [id number] : [id number] [place]       Comprehensive Metabolic Panel [[id number]]  (Abnormal) Collected: 08/09/22 0546    Specimen: Blood Updated: 08/09/22 0652     Glucose 117 mg/dL      BUN 13 mg/dL      Creatinine 0.45 mg/dL      Sodium 140 mmol/L      Potassium 3.9 mmol/L      Chloride 105 mmol/L      CO2 27.0 mmol/L      Calcium 8.4 mg/dL      Total  Protein 5.8 g/dL      Albumin 3.20 g/dL      ALT (SGPT) 18 U/L      AST (SGOT) 15 U/L      Alkaline Phosphatase 65 U/L      Total Bilirubin <0.2 mg/dL      Globulin 2.6 gm/dL      Comment: Calculated Result        A/G Ratio 1.2 g/dL      BUN/Creatinine Ratio 28.9     Anion Gap 8.0 mmol/L      eGFR 120.3 mL/min/1.73      Comment: National Kidney Foundation and American Society of Nephrology (ASN) Task Force recommended calculation based on the Chronic Kidney Disease Epidemiology Collaboration (CKD-EPI) equation refit without adjustment for race.       Narrative:      GFR Normal >60  Chronic Kidney Disease <60  Kidney Failure <15      Phosphorus [[id number]]  (Normal) Collected: 08/09/22 0546    Specimen: Blood Updated: 08/09/22 0652     Phosphorus 3.9 mg/dL     Magnesium [[id number]]  (Normal) Collected: 08/09/22 0546    Specimen: Blood Updated: 08/09/22 0652     Magnesium 1.7 mg/dL     CBC & Differential [[id number]]  (Abnormal) Collected: 08/09/22 0546    Specimen: Blood Updated: 08/09/22 0619    Narrative:      The following orders were created for panel order CBC & Differential.  Procedure                               Abnormality         Status                     ---------                               -----------         ------                     CBC Auto Differential[[id number]]        Abnormal            Final result                 Please view results for these tests on the individual orders.    CBC Auto Differential [[id number]]  (Abnormal) Collected: 08/09/22 0546    Specimen: Blood Updated: 08/09/22 0619     WBC 12.01 10*3/mm3      RBC 3.55 10*6/mm3      Hemoglobin 8.9 g/dL      Hematocrit 28.9 %      MCV 81.4 fL      MCH 25.1 pg      MCHC 30.8 g/dL      RDW 15.9 %      RDW-SD 47.8 fl      MPV 9.8 fL      Platelets 392 10*3/mm3      Neutrophil % 71.5 %      Lymphocyte % 14.3 %      Monocyte % 11.0 %      Eosinophil % 1.7 %      Basophil % 0.2 %      Immature Grans % 1.3 %      Neutrophils, Absolute 8.58  10*3/mm3      Lymphocytes, Absolute 1.72 10*3/mm3      Monocytes, Absolute 1.32 10*3/mm3      Eosinophils, Absolute 0.21 10*3/mm3      Basophils, Absolute 0.02 10*3/mm3      Immature Grans, Absolute 0.16 10*3/mm3      nRBC 0.0 /100 WBC     POC Glucose Once [[id number]]  (Abnormal) Collected: 08/08/22 2009    Specimen: Blood Updated: 08/08/22 2013     Glucose 152 mg/dL      Comment: Meter: [id number] : [id number] Homa Josephvaleva       Sodium [[id number]]  (Normal) Collected: 08/08/22 1843    Specimen: Blood Updated: 08/08/22 1912     Sodium 142 mmol/L     POC Glucose Once [[id number]]  (Normal) Collected: 08/08/22 1654    Specimen: Blood Updated: 08/08/22 1657     Glucose 124 mg/dL      Comment: Meter: [id number] : [id number] Yair Epps             Imaging Results (Last 48 Hours)     ** No results found for the last 48 hours. **        Operative/Procedure Notes (last 48 hours)  Notes from 08/08/22 1333 through 08/10/22 1333   No notes of this type exist for this encounter.          Physician Progress Notes (last 48 hours)      Ethan Burrows MD at 08/10/22 1312          INTENSIVIST / PULMONARY FOLLOW UP NOTE     Hospital:  LOS: 16 days   Ms. Mary Celis, 46 y.o. female is followed for:     SAH     Iron deficiency anemia due to chronic blood loss    Essential hypertension    Smoker    Class 1 obesity in adult    Illicit drug use (UDS + meth/amphetamines, opiates, and oxycodone)     Hyperglycemia       Subjective   SUBJECTIVE   No complaints today    The patient's relevant past medical, surgical, family, and social history were reviewed    Allergies and medications were reviewed    ROS:    Per subjective, all other systems were reviewed and were negative     Objective   OBJECTIVE     Vital Sign Min/Max for last 24 hours:  Temp  Min: 98 °F (36.7 °C)  Max: 99.2 °F (37.3 °C)   BP  Min: 93/37  Max: 138/70   Pulse  Min: 58  Max: 80   Resp  Min: 14  Max: 16   SpO2  Min: 94 %  Max: 100 %   No data  recorded     Physical Exam:  General Appearance:  No acute distress  Eyes:  No scleral icterus or pallor, pupils normal  Ears, Nose, Mouth, Throat:  Atraumatic, oropharynx clear  Neck:  Trachea midline, thyroid normal  Respiratory:  Clear to auscultation bilaterally, normal effort  Cardiovascular:  Regular rate and rhythm, no murmurs, no peripheral edema  Gastrointestinal:  Soft, non-tender, non-distended, no hepatosplenomegaly  Skin:  Normal temperature, no rash  Psychiatric:  No agitation  Neuro:    Interval:  (Shift Change)  1a. Level of Consciousness: 0-->Alert, keenly responsive  1b. LOC Questions: 0-->Answers both questions correctly  1c. LOC Commands: 0-->Performs both tasks correctly  2. Best Gaze: 0-->Normal  3. Visual: 0-->No visual loss  4. Facial Palsy: 0-->Normal symmetrical movements  5a. Motor Arm, Left: 0-->No drift, limb holds 90 (or 45) degrees for full 10 secs  5b. Motor Arm, Right: 0-->No drift, limb holds 90 (or 45) degrees for full 10 secs  6a. Motor Leg, Left: 0-->No drift, leg holds 30 degree position for full 5 secs  6b. Motor Leg, Right: 0-->No drift, leg holds 30 degree position for full 5 secs  7. Limb Ataxia: 0-->Absent  8. Sensory: 0-->Normal, no sensory loss  9. Best Language: 0-->No aphasia, normal  10. Dysarthria: 0-->Normal  11. Extinction and Inattention (formerly Neglect): 0-->No abnormality    Total (NIH Stroke Scale): 0      Telemetry:              Hemodynamics:   CVP:     PAP:     PAOP:     CO:     CI:     SVI:     SVR:       SpO2: 94 % SpO2  Min: 94 %  Max: 100 %   Device:      Flow Rate:   No data recorded     Mechanical Ventilator Settings:                                         Intake/Ouptut 24 hrs (7:00AM - 6:59 AM)  Intake & Output (last 3 days)       08/07 0701 08/08 0700 08/08 0701 08/09 0700 08/09 0701  08/10 0700 08/10 0701  08/11 0700    P.O.  240    I.V. (mL/kg) 3864.5 (37.9) 1999.2 (19.6) 1315.6 (14.1) 150.6 (1.6)    IV Piggyback 100 100 100      Total Intake(mL/kg) 4314.5 (42.3) 2849.2 (27.9) 2925.6 (31.4) 390.6 (4.2)    Urine (mL/kg/hr) 4100 (1.7) 3250 (1.3) 3450 (1.5) 1050 (1.8)    Stool   0     Total Output 4100 3250 3450 1050    Net +214.5 -400.8 -524.4 -659.4            Urine Unmeasured Occurrence 101 x 1 x      Stool Unmeasured Occurrence   1 x           Lines, Drains & Airways     Active LDAs     Name Placement date Placement time Site Days    PICC Triple Lumen 07/30/22 Right Basilic 07/30/22  1138  Basilic  7    Urethral Catheter 07/31/22  1100  -- 6    Arterial Line 07/31/22 Left Radial 07/31/22  1034  created via procedure documentation  Radial  6                Hematology:  Results from last 7 days   Lab Units 08/10/22  0528 08/09/22  0546 08/07/22  0428 08/06/22  0512   WBC 10*3/mm3 9.39 12.01* 17.63* 18.31*   HEMOGLOBIN g/dL 8.5* 8.9* 9.9* 9.3*   HEMATOCRIT % 29.3* 28.9* 32.3* 30.1*   PLATELETS 10*3/mm3 365 392 400 415     Electrolytes, Magnesium and Phosphorus:  Results from last 7 days   Lab Units 08/10/22  0528 08/09/22  1855 08/09/22  0546 08/08/22  1843 08/08/22  0611 08/07/22  1816 08/07/22  0428 08/06/22  1126 08/06/22  0512 08/05/22  0608 08/04/22  2329 08/04/22  0530 08/03/22  2358   SODIUM mmol/L 141 142 140 142 133* 138 135*   < > 138   < > 140  140   < > 140  140   CHLORIDE mmol/L 105  --  105  --  96*  --  98  --  102  --  105  --  107   POTASSIUM mmol/L 3.5  --  3.9  --  4.1  --  3.8  --  4.0  --  4.6  --  4.2   CO2 mmol/L 26.0  --  27.0  --  28.0  --  31.0*  --  28.0  --  27.0  --  23.0   MAGNESIUM mg/dL 1.7  --  1.7  --  2.0  --  1.8  --  1.6  --  1.7  --  1.8   PHOSPHORUS mg/dL 3.5  --  3.9  --  4.5  --  4.6*  --   --   --   --   --   --     < > = values in this interval not displayed.     Renal:  Results from last 7 days   Lab Units 08/10/22  0528 08/09/22  0546 08/08/22  0611 08/07/22  0428 08/06/22  0512 08/04/22  2329 08/03/22  2358   CREATININE mg/dL 0.50* 0.45* 0.54* 0.45* 0.53* 0.40* 0.56*   BUN mg/dL 12 13 14 16 17 13  14     Estimated Creatinine Clearance: 164.7 mL/min (A) (by C-G formula based on SCr of 0.5 mg/dL (L)).  Hepatic:  Results from last 7 days   Lab Units 08/10/22  0528 08/09/22  0546 08/07/22  0428   ALK PHOS U/L 70 65 59   BILIRUBIN mg/dL <0.2 <0.2 0.3   ALT (SGPT) U/L 19 18 16   AST (SGOT) U/L 16 15 15     Arterial Blood Gases:              No results found for: LACTATE    Relevant imaging studies and labs from 08/10/22 were reviewed    Medications (drips):       acetaminophen, 325 mg, Oral, Daily  aspirin, 81 mg, Oral, Daily  cefTRIAXone, 1 g, Intravenous, Q24H  fludrocortisone, 100 mcg, Oral, Q12H  insulin lispro, 0-7 Units, Subcutaneous, TID AC  levETIRAcetam, 500 mg, Oral, BID  lisinopril, 10 mg, Oral, Q24H  nicotine, 1 patch, Transdermal, Q24H  niMODipine, 60 mg, Oral, Q4H  pantoprazole, 40 mg, Oral, Q AM  polyethylene glycol, 17 g, Oral, Daily   And  senna-docusate sodium, 2 tablet, Oral, BID  sodium chloride, 2 g, Oral, TID With Meals  ticagrelor, 90 mg, Oral, BID        acetaminophen  •  senna-docusate sodium **AND** polyethylene glycol **AND** bisacodyl **AND** bisacodyl  •  busPIRone  •  clonazePAM  •  dextrose  •  dextrose  •  enalaprilat  •  glucagon (human recombinant)  •  hydrALAZINE  •  labetalol  •  magnesium sulfate **OR** magnesium sulfate **OR** magnesium sulfate  •  ondansetron  •  oxyCODONE-acetaminophen  •  potassium chloride **OR** potassium chloride **OR** potassium chloride  •  potassium phosphate infusion greater than 15 mMoles **OR** potassium phosphate infusion greater than 15 mMoles **OR** potassium phosphate **OR** sodium phosphate IVPB **OR** sodium phosphate IVPB **OR** sodium phosphate IVPB  •  simethicone    Assessment & Plan   IMPRESSION / PLAN     Inpatient Problem List:  46 y.o.female:  Active Hospital Problems    Diagnosis    • **SAH     • Hyperglycemia    • Smoker    • Class 1 obesity in adult    • Illicit drug use (UDS + meth/amphetamines, opiates, and oxycodone)     •  Essential hypertension    • Iron deficiency anemia due to chronic blood loss         Hospital Course:  Ms. Celis is a 45yo F who [facility] to [facility] for headache. Imaging revealed a large subarachnoid hemorrhage with intraventricular involvement but no evidence of aneurysm and was transferred to [facility] on 7/25/22 for neurosurgical evaluation. Cerebral angiogram on 7/26 was negative for AVM or aneurysm. UDS was positive for methamphetamines and opiates. She was started on Nimotop, Keppra and daily TCD. 3% Saline was started.      She was taken back to the cath lab by Dr. 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      TCDs show improving velocities overall.     LUE duplex from 8/2/22 shows a superficial thrombophlebitis.    Impression:  No major changes.    Plan:    SAH  Per NS.  She was changed from 3% to 1.5% saline on 8/6/22, remains on ASA / Brilinta, 21 days of Nimotop.  8/10/22 1.5% saline and TCD's being discontinued by NS.  Keppra, Florinef, salt tabs per NS.    Anemia  Leukocytosis  monitor    Hypomagnesemia  Replace prn    Hyperglycemia A1c 5.3  Improved, no longer on steroids, d/cd levemir, d/c fingersticks    Proteus UTI  Rocephin x 5 days    DVT Prophylaxis  SCDs    D/cd Martínez 8/7/22    Ok for tele from my standpoint when ok with Neuro    Nutrition  Dietary Orders (From admission, onward)     Start     Ordered    07/31/22 1430  Diet Regular  Diet Effective Now        Question:  Diet Texture / Consistency  Answer:  Regular    07/31/22 1430              Plan of care and goals reviewed with multidisciplinary team at daily rounds         Ethan Burrows MD  Intensive Care Medicine  08/10/22 13:12 EDT         Electronically signed by Ethan Burrows MD at 08/10/22 1318     Duran Willis MD at 08/10/22 0902          Subjective    This is a 46 y.o. female [place]-A[place] 2 [place]en 3 aneurysmal subarachnoid hemorrhage who is post-bleed day 15 post-op day  10 s/p Pipeline embolization of a Basilar aneurysm.       Objective    Vitals:    08/10/22 0500   BP: 106/59   Pulse: 72   Resp:    Temp:    SpO2: 98%     Pulse Readings from Last 3 Encounters:   08/10/22 72   17 84   17 71     Systolic (24hrs), Av , Min:93 , Max:138     Diastolic (24hrs), Av, Min:37, Max:72    Temp (24hrs), Av.3 °F (36.8 °C), Min:98 °F (36.7 °C), Max:99 °F (37.2 °C)      Lab Results   Component Value Date     08/10/2022         Assessment/Plan  Diagnosis: Subarachnoid Hemorrhage  Her transcranial Dopplers remain modestly elevated although she is beginning to exit her vasospasm window.  Her systolic blood pressures are self-regulating in the 120s-130s.  I think we can discontinue her transcranial Dopplers at this point.    Her sodium has improved since starting Florinef so I will stop her 1-1/2% and we will continue to check her serum sodiums.    She will almost certainly continue to have some head pain and this is normal to last all the way out to 6 weeks or so.  Hopefully we can get her home in the next few days.    She will need a diagnostic angiogram on a relatively short-term basis to ensure that she does not have any recanalization of her aneurysm.      Electronically signed by Duran Willis MD at 08/10/22 0980     Ethan Burrows MD at 22 1307          INTENSIVIST / PULMONARY FOLLOW UP NOTE     Hospital:  LOS: 15 days   Ms. Mary Celis, 46 y.o. female is followed for:     SAH     Iron deficiency anemia due to chronic blood loss    Essential hypertension    Smoker    Class 1 obesity in adult    Illicit drug use (UDS + meth/amphetamines, opiates, and oxycodone)     Hyperglycemia       Subjective   SUBJECTIVE   No complaints today    The patient's relevant past medical, surgical, family, and social history were reviewed    Allergies and medications were reviewed    ROS:    Per subjective, all other systems were reviewed and were  negative     Objective   OBJECTIVE     Vital Sign Min/Max for last 24 hours:  Temp  Min: 98.3 °F (36.8 °C)  Max: 98.8 °F (37.1 °C)   BP  Min: 111/62  Max: 140/71   Pulse  Min: 59  Max: 80   Resp  Min: 16  Max: 20   SpO2  Min: 90 %  Max: 100 %   No data recorded     Physical Exam:  General Appearance:  No acute distress  Eyes:  No scleral icterus or pallor, pupils normal  Ears, Nose, Mouth, Throat:  Atraumatic, oropharynx clear  Neck:  Trachea midline, thyroid normal  Respiratory:  Clear to auscultation bilaterally, normal effort  Cardiovascular:  Regular rate and rhythm, no murmurs, no peripheral edema  Gastrointestinal:  Soft, non-tender, non-distended, no hepatosplenomegaly  Skin:  Normal temperature, no rash  Psychiatric:  No agitation  Neuro:    Interval:  (shift change)  1a. Level of Consciousness: 0-->Alert, keenly responsive  1b. LOC Questions: 0-->Answers both questions correctly  1c. LOC Commands: 0-->Performs both tasks correctly  2. Best Gaze: 0-->Normal  3. Visual: 0-->No visual loss  4. Facial Palsy: 0-->Normal symmetrical movements  5a. Motor Arm, Left: 0-->No drift, limb holds 90 (or 45) degrees for full 10 secs  5b. Motor Arm, Right: 0-->No drift, limb holds 90 (or 45) degrees for full 10 secs  6a. Motor Leg, Left: 0-->No drift, leg holds 30 degree position for full 5 secs  6b. Motor Leg, Right: 0-->No drift, leg holds 30 degree position for full 5 secs  7. Limb Ataxia: 0-->Absent  8. Sensory: 0-->Normal, no sensory loss  9. Best Language: 0-->No aphasia, normal  10. Dysarthria: 0-->Normal  11. Extinction and Inattention (formerly Neglect): 0-->No abnormality    Total (NIH Stroke Scale): 0      Telemetry:              Hemodynamics:   CVP:     PAP:     PAOP:     CO:     CI:     SVI:     SVR:       SpO2: 96 % SpO2  Min: 90 %  Max: 100 %   Device:      Flow Rate:   No data recorded     Mechanical Ventilator Settings:                                         Intake/Ouptut 24 hrs (7:00AM - 6:59 AM)  Intake  & Output (last 3 days)       08/06 0701  08/07 0700 08/07 0701  08/08 0700 08/08 0701 08/09 0700 08/09 0701  08/10 0700    P.O. 1320 350 750 360    I.V. (mL/kg) 1472 (14.4) 3864.5 (37.9) 1999.2 (19.6) 347.3 (3.4)    IV Piggyback  100 100     Total Intake(mL/kg) 2792 (27.4) 4314.5 (42.3) 2849.2 (27.9) 707.3 (6.9)    Urine (mL/kg/hr) 7430 (3) 4100 (1.7) 3250 (1.3) 800 (1.3)    Stool 0       Total Output 7430 4100 3250 800    Net -4638 +214.5 -400.8 -92.7            Urine Unmeasured Occurrence  101 x 1 x     Stool Unmeasured Occurrence 1 x             Lines, Drains & Airways     Active LDAs     Name Placement date Placement time Site Days    PICC Triple Lumen 07/30/22 Right Basilic 07/30/22  1138  Basilic  7    Urethral Catheter 07/31/22  1100  -- 6    Arterial Line 07/31/22 Left Radial 07/31/22  1034  created via procedure documentation  Radial  6                Hematology:  Results from last 7 days   Lab Units 08/09/22  0546 08/07/22  0428 08/06/22  0512   WBC 10*3/mm3 12.01* 17.63* 18.31*   HEMOGLOBIN g/dL 8.9* 9.9* 9.3*   HEMATOCRIT % 28.9* 32.3* 30.1*   PLATELETS 10*3/mm3 392 400 415     Electrolytes, Magnesium and Phosphorus:  Results from last 7 days   Lab Units 08/09/22  0546 08/08/22  1843 08/08/22  0611 08/07/22  1816 08/07/22  0428 08/07/22  0003 08/06/22  1126 08/06/22  0512 08/05/22  0608 08/04/22  2329 08/04/22  0530 08/03/22  2358 08/03/22  1210 08/03/22  0505   SODIUM mmol/L 140 142 133* 138 135* 135* 137 138   < > 140  140   < > 140  140   < > 140  140   CHLORIDE mmol/L 105  --  96*  --  98  --   --  102  --  105  --  107  --  106   POTASSIUM mmol/L 3.9  --  4.1  --  3.8  --   --  4.0  --  4.6  --  4.2  --  4.2   CO2 mmol/L 27.0  --  28.0  --  31.0*  --   --  28.0  --  27.0  --  23.0  --  25.0   MAGNESIUM mg/dL 1.7  --  2.0  --  1.8  --   --  1.6  --  1.7  --  1.8  --  1.7   PHOSPHORUS mg/dL 3.9  --  4.5  --  4.6*  --   --   --   --   --   --   --   --   --     < > = values in this interval not  displayed.     Renal:  Results from last 7 days   Lab Units 08/09/22  0546 08/08/22  0611 08/07/22  0428 08/06/22  0512 08/04/22  2329 08/03/22  2358 08/03/22  0505   CREATININE mg/dL 0.45* 0.54* 0.45* 0.53* 0.40* 0.56* 0.48*   BUN mg/dL 13 14 16 17 13 14 13     Estimated Creatinine Clearance: 191.4 mL/min (A) (by C-G formula based on SCr of 0.45 mg/dL (L)).  Hepatic:  Results from last 7 days   Lab Units 08/09/22  0546 08/07/22  0428   ALK PHOS U/L 65 59   BILIRUBIN mg/dL <0.2 0.3   ALT (SGPT) U/L 18 16   AST (SGOT) U/L 15 15     Arterial Blood Gases:              No results found for: LACTATE    Relevant imaging studies and labs from 08/09/22 were reviewed    Medications (drips):  sodium chloride 1.5% (HYPERTONIC) 500 mL infusion, Last Rate: 50 mL/hr (08/09/22 0555)        acetaminophen, 325 mg, Oral, Daily  aspirin, 81 mg, Oral, Daily  cefTRIAXone, 1 g, Intravenous, Q24H  fludrocortisone, 100 mcg, Oral, Q12H  insulin lispro, 0-7 Units, Subcutaneous, TID AC  levETIRAcetam, 500 mg, Oral, BID  lisinopril, 10 mg, Oral, Q24H  nicotine, 1 patch, Transdermal, Q24H  niMODipine, 60 mg, Oral, Q4H  pantoprazole, 40 mg, Oral, Q AM  polyethylene glycol, 17 g, Oral, Daily   And  senna-docusate sodium, 2 tablet, Oral, BID  sodium chloride, 2 g, Oral, TID With Meals  ticagrelor, 90 mg, Oral, BID        acetaminophen  •  senna-docusate sodium **AND** polyethylene glycol **AND** bisacodyl **AND** bisacodyl  •  busPIRone  •  clonazePAM  •  dextrose  •  dextrose  •  enalaprilat  •  glucagon (human recombinant)  •  hydrALAZINE  •  labetalol  •  magnesium sulfate **OR** magnesium sulfate **OR** magnesium sulfate  •  ondansetron  •  oxyCODONE-acetaminophen  •  potassium chloride **OR** potassium chloride **OR** potassium chloride  •  potassium phosphate infusion greater than 15 mMoles **OR** potassium phosphate infusion greater than 15 mMoles **OR** potassium phosphate **OR** sodium phosphate IVPB **OR** sodium phosphate IVPB **OR**  sodium phosphate IVPB  •  simethicone    Assessment & Plan   IMPRESSION / PLAN     Inpatient Problem List:  46 y.o.female:  Active Hospital Problems    Diagnosis    • **SAH     • Hyperglycemia    • Smoker    • Class 1 obesity in adult    • Illicit drug use (UDS + meth/amphetamines, opiates, and oxycodone)     • Essential hypertension    • Iron deficiency anemia due to chronic blood loss         Hospital Course:  Ms. Celis is a 47yo F who presented to [facility] for headache. Imaging revealed a large subarachnoid hemorrhage with intraventricular involvement but no evidence of aneurysm and was transferred to [facility] on 7/25/22 for neurosurgical evaluation. Cerebral angiogram on 7/26 was negative for AVM or aneurysm. UDS was positive for methamphetamines and opiates. She was started on Nimotop, Keppra and daily TCD. 3% Saline was started.      She was taken back to the cath lab by Dr. 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      TCDs show improving velocities overall.     LUE duplex from 8/2/22 shows a superficial thrombophlebitis.    Impression:  No major changes.    Plan:    SAH  Per NS.  She was changed from 3% to 1.5% saline on 8/6/22, remains on ASA / Brilinta, 21 days of Nimotop.  Day#17, exiting vasospasm window.  Steroids / keppra also per NS.    Anemia  Leukocytosis  monitor    Hypomagnesemia  Replace prn    Hyperglycemia  Improved, no longer on steroids, d/cd levemir, continue correction scale    Possible UTI  Rocephin started 8/8, d/c if culture neg    DVT Prophylaxis  SCDs    D/cd Martínez 8/7/22    Ok for tele from my standpoint when ok with Neuro    Nutrition  Dietary Orders (From admission, onward)     Start     Ordered    08/07/22 1800  DIET MESSAGE Please send pepperoni pizza and a salad with ranch please. Thank you!  Daily With Dinner      Comments: Please send pepperoni pizza and a salad with ranch please. Thank you!    08/07/22 1428    08/06/22 1408  DIET MESSAGE  Please send pepperoni pizza and salad with ranch for dinner. Thank you!  Once        Comments: Please send pepperoni pizza and salad with ranch for dinner. Thank you!    08/06/22 1407    07/31/22 1430  Diet Regular  Diet Effective Now        Question:  Diet Texture / Consistency  Answer:  Regular    07/31/22 1430              Plan of care and goals reviewed with multidisciplinary team at daily rounds         Ethan Burrows MD  Intensive Care Medicine  08/09/22 13:04 EDT         Electronically signed by Ethan Burrows MD at 08/09/22 6103

## 2022-08-10 NOTE — PROGRESS NOTES
INTENSIVIST / PULMONARY FOLLOW UP NOTE     Hospital:  LOS: 16 days   Ms. Mary Celis, 46 y.o. female is followed for:     SAH     Iron deficiency anemia due to chronic blood loss    Essential hypertension    Smoker    Class 1 obesity in adult    Illicit drug use (UDS + meth/amphetamines, opiates, and oxycodone)     Hyperglycemia          SUBJECTIVE   No complaints today    The patient's relevant past medical, surgical, family, and social history were reviewed    Allergies and medications were reviewed    ROS:    Per subjective, all other systems were reviewed and were negative        OBJECTIVE     Vital Sign Min/Max for last 24 hours:  Temp  Min: 98 °F (36.7 °C)  Max: 99.2 °F (37.3 °C)   BP  Min: 93/37  Max: 138/70   Pulse  Min: 58  Max: 80   Resp  Min: 14  Max: 16   SpO2  Min: 94 %  Max: 100 %   No data recorded     Physical Exam:  General Appearance:  No acute distress  Eyes:  No scleral icterus or pallor, pupils normal  Ears, Nose, Mouth, Throat:  Atraumatic, oropharynx clear  Neck:  Trachea midline, thyroid normal  Respiratory:  Clear to auscultation bilaterally, normal effort  Cardiovascular:  Regular rate and rhythm, no murmurs, no peripheral edema  Gastrointestinal:  Soft, non-tender, non-distended, no hepatosplenomegaly  Skin:  Normal temperature, no rash  Psychiatric:  No agitation  Neuro:    Interval:  (Shift Change)  1a. Level of Consciousness: 0-->Alert, keenly responsive  1b. LOC Questions: 0-->Answers both questions correctly  1c. LOC Commands: 0-->Performs both tasks correctly  2. Best Gaze: 0-->Normal  3. Visual: 0-->No visual loss  4. Facial Palsy: 0-->Normal symmetrical movements  5a. Motor Arm, Left: 0-->No drift, limb holds 90 (or 45) degrees for full 10 secs  5b. Motor Arm, Right: 0-->No drift, limb holds 90 (or 45) degrees for full 10 secs  6a. Motor Leg, Left: 0-->No drift, leg holds 30 degree position for full 5 secs  6b. Motor Leg, Right: 0-->No drift, leg holds 30 degree position for full  5 secs  7. Limb Ataxia: 0-->Absent  8. Sensory: 0-->Normal, no sensory loss  9. Best Language: 0-->No aphasia, normal  10. Dysarthria: 0-->Normal  11. Extinction and Inattention (formerly Neglect): 0-->No abnormality    Total (NIH Stroke Scale): 0      Telemetry:              Hemodynamics:   CVP:     PAP:     PAOP:     CO:     CI:     SVI:     SVR:       SpO2: 94 % SpO2  Min: 94 %  Max: 100 %   Device:      Flow Rate:   No data recorded     Mechanical Ventilator Settings:                                         Intake/Ouptut 24 hrs (7:00AM - 6:59 AM)  Intake & Output (last 3 days)       08/07 0701 08/08 0700 08/08 0701 08/09 0700 08/09 0701  08/10 0700 08/10 0701 08/11 0700    P.O.  240    I.V. (mL/kg) 3864.5 (37.9) 1999.2 (19.6) 1315.6 (14.1) 150.6 (1.6)    IV Piggyback 100 100 100     Total Intake(mL/kg) 4314.5 (42.3) 2849.2 (27.9) 2925.6 (31.4) 390.6 (4.2)    Urine (mL/kg/hr) 4100 (1.7) 3250 (1.3) 3450 (1.5) 1050 (1.8)    Stool   0     Total Output 4100 3250 3450 1050    Net +214.5 -400.8 -524.4 -659.4            Urine Unmeasured Occurrence 101 x 1 x      Stool Unmeasured Occurrence   1 x           Lines, Drains & Airways     Active LDAs     Name Placement date Placement time Site Days    PICC Triple Lumen 07/30/22 Right Basilic 07/30/22  1138  Basilic  7    Urethral Catheter 07/31/22  1100  -- 6    Arterial Line 07/31/22 Left Radial 07/31/22  1034  created via procedure documentation  Radial  6                Hematology:  Results from last 7 days   Lab Units 08/10/22  0528 08/09/22  0546 08/07/22  0428 08/06/22  0512   WBC 10*3/mm3 9.39 12.01* 17.63* 18.31*   HEMOGLOBIN g/dL 8.5* 8.9* 9.9* 9.3*   HEMATOCRIT % 29.3* 28.9* 32.3* 30.1*   PLATELETS 10*3/mm3 365 392 400 415     Electrolytes, Magnesium and Phosphorus:  Results from last 7 days   Lab Units 08/10/22  0528 08/09/22  1855 08/09/22  0546 08/08/22  1843 08/08/22  0611 08/07/22  1816 08/07/22  0428 08/06/22  1126 08/06/22  0512 08/05/22  0608  08/04/22 2329 08/04/22 0530 08/03/22  2358   SODIUM mmol/L 141 142 140 142 133* 138 135*   < > 138   < > 140  140   < > 140  140   CHLORIDE mmol/L 105  --  105  --  96*  --  98  --  102  --  105  --  107   POTASSIUM mmol/L 3.5  --  3.9  --  4.1  --  3.8  --  4.0  --  4.6  --  4.2   CO2 mmol/L 26.0  --  27.0  --  28.0  --  31.0*  --  28.0  --  27.0  --  23.0   MAGNESIUM mg/dL 1.7  --  1.7  --  2.0  --  1.8  --  1.6  --  1.7  --  1.8   PHOSPHORUS mg/dL 3.5  --  3.9  --  4.5  --  4.6*  --   --   --   --   --   --     < > = values in this interval not displayed.     Renal:  Results from last 7 days   Lab Units 08/10/22  0528 08/09/22  0546 08/08/22  0611 08/07/22 0428 08/06/22 0512 08/04/22 2329 08/03/22  2358   CREATININE mg/dL 0.50* 0.45* 0.54* 0.45* 0.53* 0.40* 0.56*   BUN mg/dL 12 13 14 16 17 13 14     Estimated Creatinine Clearance: 164.7 mL/min (A) (by C-G formula based on SCr of 0.5 mg/dL (L)).  Hepatic:  Results from last 7 days   Lab Units 08/10/22  0528 08/09/22  0546 08/07/22  0428   ALK PHOS U/L 70 65 59   BILIRUBIN mg/dL <0.2 <0.2 0.3   ALT (SGPT) U/L 19 18 16   AST (SGOT) U/L 16 15 15     Arterial Blood Gases:              No results found for: LACTATE    Relevant imaging studies and labs from 08/10/22 were reviewed    Medications (drips):       acetaminophen, 325 mg, Oral, Daily  aspirin, 81 mg, Oral, Daily  cefTRIAXone, 1 g, Intravenous, Q24H  fludrocortisone, 100 mcg, Oral, Q12H  insulin lispro, 0-7 Units, Subcutaneous, TID AC  levETIRAcetam, 500 mg, Oral, BID  lisinopril, 10 mg, Oral, Q24H  nicotine, 1 patch, Transdermal, Q24H  niMODipine, 60 mg, Oral, Q4H  pantoprazole, 40 mg, Oral, Q AM  polyethylene glycol, 17 g, Oral, Daily   And  senna-docusate sodium, 2 tablet, Oral, BID  sodium chloride, 2 g, Oral, TID With Meals  ticagrelor, 90 mg, Oral, BID        acetaminophen  •  senna-docusate sodium **AND** polyethylene glycol **AND** bisacodyl **AND** bisacodyl  •  busPIRone  •  clonazePAM  •   dextrose  •  dextrose  •  enalaprilat  •  glucagon (human recombinant)  •  hydrALAZINE  •  labetalol  •  magnesium sulfate **OR** magnesium sulfate **OR** magnesium sulfate  •  ondansetron  •  oxyCODONE-acetaminophen  •  potassium chloride **OR** potassium chloride **OR** potassium chloride  •  potassium phosphate infusion greater than 15 mMoles **OR** potassium phosphate infusion greater than 15 mMoles **OR** potassium phosphate **OR** sodium phosphate IVPB **OR** sodium phosphate IVPB **OR** sodium phosphate IVPB  •  simethicone    Assessment & Plan   IMPRESSION / PLAN     Inpatient Problem List:  46 y.o.female:  Active Hospital Problems    Diagnosis    • **SAH     • Hyperglycemia    • Smoker    • Class 1 obesity in adult    • Illicit drug use (UDS + meth/amphetamines, opiates, and oxycodone)     • Essential hypertension    • Iron deficiency anemia due to chronic blood loss         Hospital Course:  Ms. Celis is a 47yo F who presented to [facility] for headache. Imaging revealed a large subarachnoid hemorrhage with intraventricular involvement but no evidence of aneurysm and was transferred to [facility] on 7/25/22 for neurosurgical evaluation. Cerebral angiogram on 7/26 was negative for AVM or aneurysm. UDS was positive for methamphetamines and opiates. She was started on Nimotop, Keppra and daily TCD. 3% Saline was started.      She was taken back to the cath lab by Dr. 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      TCDs show improving velocities overall.     LUE duplex from 8/2/22 shows a superficial thrombophlebitis.    Impression:  No major changes.    Plan:    SAH  Per NS.  She was changed from 3% to 1.5% saline on 8/6/22, remains on ASA / Brilinta, 21 days of Nimotop.  8/10/22 1.5% saline and TCD's being discontinued by NS.  Keppra, Florinef, salt tabs per NS.    Anemia  Leukocytosis  monitor    Hypomagnesemia  Replace prn    Hyperglycemia A1c 5.3  Improved, no longer on  steroids, d/cd levemir, d/c fingersticks    Proteus UTI  Rocephin x 5 days    DVT Prophylaxis  SCDs    D/cd Martínez 8/7/22    Ok for tele from my standpoint when ok with Neuro    Nutrition  Dietary Orders (From admission, onward)     Start     Ordered    07/31/22 1430  Diet Regular  Diet Effective Now        Question:  Diet Texture / Consistency  Answer:  Regular    07/31/22 1430              Plan of care and goals reviewed with multidisciplinary team at daily rounds         Ethan Burrows MD  Intensive Care Medicine  08/10/22 13:12 EDT

## 2022-08-10 NOTE — PLAN OF CARE
Goal Outcome Evaluation:  Plan of Care Reviewed With: patient        Progress: no change  Outcome Evaluation: NIH 0. Patient A&O. VSS, Adequate UOP. Still on 1.5% Saline @ 50 ml/hr, 6pm Sodium was 142.   Patient continues to C/O HA which improves with PRN narcs. Patient requesting buspar and klonopin per PRN frequency.

## 2022-08-10 NOTE — PLAN OF CARE
Goal Outcome Evaluation:  Plan of Care Reviewed With: patient        Progress: (P) improving  Outcome Evaluation: (P) Pt with good progress towards mobility goals. Pt demo's improved stability and balance. Ambulation of 600' with SBA and BUE supported on tele monitor was well tolerated. Cues provided for increasing gait speed. Pt will continue to benefit from skilled IPPT. PT rec d/c home with assist.

## 2022-08-10 NOTE — PROGRESS NOTES
Subjective    This is a 46 y.o. female Hunt-Aaron 2 Holden 3 aneurysmal subarachnoid hemorrhage who is post-bleed day 15 post-op day 10 s/p Pipeline embolization of a Basilar aneurysm.       Objective    Vitals:    08/10/22 0500   BP: 106/59   Pulse: 72   Resp:    Temp:    SpO2: 98%     Pulse Readings from Last 3 Encounters:   08/10/22 72   17 84   17 71     Systolic (24hrs), Av , Min:93 , Max:138     Diastolic (24hrs), Av, Min:37, Max:72    Temp (24hrs), Av.3 °F (36.8 °C), Min:98 °F (36.7 °C), Max:99 °F (37.2 °C)      Lab Results   Component Value Date     08/10/2022         Assessment/Plan  Diagnosis: Subarachnoid Hemorrhage  Her transcranial Dopplers remain modestly elevated although she is beginning to exit her vasospasm window.  Her systolic blood pressures are self-regulating in the 120s-130s.  I think we can discontinue her transcranial Dopplers at this point.    Her sodium has improved since starting Florinef so I will stop her 1-1/2% and we will continue to check her serum sodiums.    She will almost certainly continue to have some head pain and this is normal to last all the way out to 6 weeks or so.  Hopefully we can get her home in the next few days.    She will need a diagnostic angiogram on a relatively short-term basis to ensure that she does not have any recanalization of her aneurysm.

## 2022-08-11 ENCOUNTER — READMISSION MANAGEMENT (OUTPATIENT)
Dept: CALL CENTER | Facility: HOSPITAL | Age: 47
End: 2022-08-11

## 2022-08-11 VITALS
SYSTOLIC BLOOD PRESSURE: 123 MMHG | HEIGHT: 67 IN | HEART RATE: 58 BPM | BODY MASS INDEX: 32.28 KG/M2 | RESPIRATION RATE: 16 BRPM | OXYGEN SATURATION: 96 % | DIASTOLIC BLOOD PRESSURE: 72 MMHG | TEMPERATURE: 97.6 F | WEIGHT: 205.69 LBS

## 2022-08-11 LAB
ANION GAP SERPL CALCULATED.3IONS-SCNC: 10 MMOL/L (ref 5–15)
ANION GAP SERPL CALCULATED.3IONS-SCNC: 12 MMOL/L (ref 5–15)
BUN SERPL-MCNC: 12 MG/DL (ref 6–20)
BUN SERPL-MCNC: 14 MG/DL (ref 6–20)
BUN/CREAT SERPL: 26.7 (ref 7–25)
BUN/CREAT SERPL: 31.1 (ref 7–25)
CALCIUM SPEC-SCNC: 8.6 MG/DL (ref 8.6–10.5)
CALCIUM SPEC-SCNC: 8.8 MG/DL (ref 8.6–10.5)
CHLORIDE SERPL-SCNC: 104 MMOL/L (ref 98–107)
CHLORIDE SERPL-SCNC: 107 MMOL/L (ref 98–107)
CO2 SERPL-SCNC: 25 MMOL/L (ref 22–29)
CO2 SERPL-SCNC: 27 MMOL/L (ref 22–29)
CREAT SERPL-MCNC: 0.45 MG/DL (ref 0.57–1)
CREAT SERPL-MCNC: 0.45 MG/DL (ref 0.57–1)
EGFRCR SERPLBLD CKD-EPI 2021: 120.3 ML/MIN/1.73
EGFRCR SERPLBLD CKD-EPI 2021: 120.3 ML/MIN/1.73
GLUCOSE SERPL-MCNC: 100 MG/DL (ref 65–99)
GLUCOSE SERPL-MCNC: 98 MG/DL (ref 65–99)
MAGNESIUM SERPL-MCNC: 1.9 MG/DL (ref 1.6–2.6)
PHOSPHATE SERPL-MCNC: 4.2 MG/DL (ref 2.5–4.5)
POTASSIUM SERPL-SCNC: 4.2 MMOL/L (ref 3.5–5.2)
POTASSIUM SERPL-SCNC: 4.2 MMOL/L (ref 3.5–5.2)
SODIUM SERPL-SCNC: 140 MMOL/L (ref 136–145)
SODIUM SERPL-SCNC: 141 MMOL/L (ref 136–145)
SODIUM SERPL-SCNC: 144 MMOL/L (ref 136–145)
SODIUM SERPL-SCNC: 144 MMOL/L (ref 136–145)

## 2022-08-11 PROCEDURE — 99239 HOSP IP/OBS DSCHRG MGMT >30: CPT | Performed by: NURSE PRACTITIONER

## 2022-08-11 PROCEDURE — 84295 ASSAY OF SERUM SODIUM: CPT | Performed by: NEUROLOGICAL SURGERY

## 2022-08-11 PROCEDURE — 80048 BASIC METABOLIC PNL TOTAL CA: CPT | Performed by: NURSE PRACTITIONER

## 2022-08-11 PROCEDURE — 0 MAGNESIUM SULFATE 4 GM/100ML SOLUTION: Performed by: INTERNAL MEDICINE

## 2022-08-11 PROCEDURE — 80048 BASIC METABOLIC PNL TOTAL CA: CPT | Performed by: NEUROLOGICAL SURGERY

## 2022-08-11 PROCEDURE — 83735 ASSAY OF MAGNESIUM: CPT | Performed by: NEUROLOGICAL SURGERY

## 2022-08-11 PROCEDURE — 84100 ASSAY OF PHOSPHORUS: CPT | Performed by: INTERNAL MEDICINE

## 2022-08-11 RX ORDER — LEVETIRACETAM 500 MG/1
500 TABLET ORAL 2 TIMES DAILY
Qty: 60 TABLET | Refills: 2 | Status: SHIPPED | OUTPATIENT
Start: 2022-08-11

## 2022-08-11 RX ORDER — NIMODIPINE 30 MG/1
60 CAPSULE, LIQUID FILLED ORAL
Qty: 40 CAPSULE | Refills: 0 | Status: ON HOLD | OUTPATIENT
Start: 2022-08-11 | End: 2022-09-01

## 2022-08-11 RX ORDER — LISINOPRIL 10 MG/1
10 TABLET ORAL
Qty: 60 TABLET | Refills: 2 | Status: SHIPPED | OUTPATIENT
Start: 2022-08-12 | End: 2022-08-11 | Stop reason: SDUPTHER

## 2022-08-11 RX ORDER — SODIUM CHLORIDE 1000 MG
2 TABLET, SOLUBLE MISCELLANEOUS
Qty: 60 TABLET | Refills: 2 | Status: ON HOLD | OUTPATIENT
Start: 2022-08-11 | End: 2022-09-01

## 2022-08-11 RX ORDER — LEVETIRACETAM 500 MG/1
500 TABLET ORAL 2 TIMES DAILY
Qty: 60 TABLET | Refills: 2 | Status: SHIPPED | OUTPATIENT
Start: 2022-08-11 | End: 2022-08-11 | Stop reason: SDUPTHER

## 2022-08-11 RX ORDER — SODIUM CHLORIDE 1000 MG
2 TABLET, SOLUBLE MISCELLANEOUS
Qty: 60 TABLET | Refills: 2 | Status: SHIPPED | OUTPATIENT
Start: 2022-08-11 | End: 2022-08-11 | Stop reason: SDUPTHER

## 2022-08-11 RX ORDER — NIMODIPINE 30 MG/1
60 CAPSULE, LIQUID FILLED ORAL
Qty: 40 CAPSULE | Refills: 0 | Status: SHIPPED | OUTPATIENT
Start: 2022-08-11 | End: 2022-08-11 | Stop reason: SDUPTHER

## 2022-08-11 RX ORDER — BUSPIRONE HYDROCHLORIDE 10 MG/1
10 TABLET ORAL 3 TIMES DAILY PRN
Qty: 90 TABLET | Refills: 2 | Status: SHIPPED | OUTPATIENT
Start: 2022-08-11 | End: 2022-08-11 | Stop reason: SDUPTHER

## 2022-08-11 RX ORDER — FLUDROCORTISONE ACETATE 0.1 MG/1
0.1 TABLET ORAL EVERY 12 HOURS SCHEDULED
Qty: 60 TABLET | Refills: 2 | Status: SHIPPED | OUTPATIENT
Start: 2022-08-11 | End: 2022-08-11 | Stop reason: SDUPTHER

## 2022-08-11 RX ORDER — NICOTINE 21 MG/24HR
1 PATCH, TRANSDERMAL 24 HOURS TRANSDERMAL
Qty: 30 PATCH | Refills: 2 | Status: SHIPPED | OUTPATIENT
Start: 2022-08-12 | End: 2022-08-11 | Stop reason: SDUPTHER

## 2022-08-11 RX ORDER — BUSPIRONE HYDROCHLORIDE 10 MG/1
10 TABLET ORAL 3 TIMES DAILY PRN
Qty: 90 TABLET | Refills: 2 | Status: SHIPPED | OUTPATIENT
Start: 2022-08-11

## 2022-08-11 RX ORDER — ASPIRIN 81 MG/1
81 TABLET, CHEWABLE ORAL DAILY
Qty: 60 TABLET | Refills: 2 | Status: SHIPPED | OUTPATIENT
Start: 2022-08-12 | End: 2022-08-11 | Stop reason: SDUPTHER

## 2022-08-11 RX ORDER — FLUDROCORTISONE ACETATE 0.1 MG/1
0.1 TABLET ORAL EVERY 12 HOURS SCHEDULED
Qty: 60 TABLET | Refills: 2 | Status: SHIPPED | OUTPATIENT
Start: 2022-08-11

## 2022-08-11 RX ORDER — ASPIRIN 81 MG/1
81 TABLET, CHEWABLE ORAL DAILY
Qty: 60 TABLET | Refills: 2 | Status: SHIPPED | OUTPATIENT
Start: 2022-08-12

## 2022-08-11 RX ORDER — NICOTINE 21 MG/24HR
1 PATCH, TRANSDERMAL 24 HOURS TRANSDERMAL
Qty: 30 PATCH | Refills: 2 | Status: SHIPPED | OUTPATIENT
Start: 2022-08-12

## 2022-08-11 RX ORDER — LISINOPRIL 10 MG/1
10 TABLET ORAL
Qty: 60 TABLET | Refills: 2 | Status: SHIPPED | OUTPATIENT
Start: 2022-08-12 | End: 2022-08-22

## 2022-08-11 RX ADMIN — NIMODIPINE 60 MG: 30 CAPSULE, LIQUID FILLED ORAL at 13:48

## 2022-08-11 RX ADMIN — NIMODIPINE 60 MG: 30 CAPSULE, LIQUID FILLED ORAL at 03:06

## 2022-08-11 RX ADMIN — CLONAZEPAM 0.5 MG: 0.5 TABLET ORAL at 12:23

## 2022-08-11 RX ADMIN — MAGNESIUM SULFATE HEPTAHYDRATE 4 G: 40 INJECTION, SOLUTION INTRAVENOUS at 08:10

## 2022-08-11 RX ADMIN — FLUDROCORTISONE ACETATE 100 MCG: 0.1 TABLET ORAL at 08:11

## 2022-08-11 RX ADMIN — ACETAMINOPHEN 325MG 325 MG: 325 TABLET ORAL at 08:10

## 2022-08-11 RX ADMIN — TICAGRELOR 90 MG: 90 TABLET ORAL at 08:11

## 2022-08-11 RX ADMIN — LEVETIRACETAM 500 MG: 500 TABLET, FILM COATED ORAL at 08:10

## 2022-08-11 RX ADMIN — NIMODIPINE 60 MG: 30 CAPSULE, LIQUID FILLED ORAL at 19:40

## 2022-08-11 RX ADMIN — BUSPIRONE HYDROCHLORIDE 10 MG: 10 TABLET ORAL at 08:10

## 2022-08-11 RX ADMIN — LISINOPRIL 10 MG: 10 TABLET ORAL at 08:11

## 2022-08-11 RX ADMIN — SODIUM CHLORIDE 2 G: 1 TABLET ORAL at 08:11

## 2022-08-11 RX ADMIN — ASPIRIN 81 MG CHEWABLE TABLET 81 MG: 81 TABLET CHEWABLE at 08:11

## 2022-08-11 RX ADMIN — OXYCODONE HYDROCHLORIDE AND ACETAMINOPHEN 1 TABLET: 7.5; 325 TABLET ORAL at 08:10

## 2022-08-11 RX ADMIN — Medication 1 PATCH: at 08:13

## 2022-08-11 RX ADMIN — OXYCODONE HYDROCHLORIDE AND ACETAMINOPHEN 1 TABLET: 7.5; 325 TABLET ORAL at 03:07

## 2022-08-11 RX ADMIN — NIMODIPINE 60 MG: 30 CAPSULE, LIQUID FILLED ORAL at 06:00

## 2022-08-11 RX ADMIN — SODIUM CHLORIDE 2 G: 1 TABLET ORAL at 12:23

## 2022-08-11 RX ADMIN — PANTOPRAZOLE SODIUM 40 MG: 40 TABLET, DELAYED RELEASE ORAL at 06:00

## 2022-08-11 RX ADMIN — NIMODIPINE 60 MG: 30 CAPSULE, LIQUID FILLED ORAL at 10:07

## 2022-08-11 RX ADMIN — OXYCODONE HYDROCHLORIDE AND ACETAMINOPHEN 1 TABLET: 7.5; 325 TABLET ORAL at 16:13

## 2022-08-11 RX ADMIN — SENNOSIDES AND DOCUSATE SODIUM 2 TABLET: 50; 8.6 TABLET ORAL at 08:10

## 2022-08-11 NOTE — PLAN OF CARE
Goal Outcome Evaluation:  Plan of Care Reviewed With: patient        Progress: improving  Outcome Evaluation: NIH 0. VSS. Sodium stable. Medicated for pain twice. Patient was tearful and requested klonopin before bed. Up to bathroom 3 times with 1.3L UOP.

## 2022-08-11 NOTE — DISCHARGE SUMMARY
DISCHARGE SUMMARY       Patient name: Mary Celis  CSN: [id number]  MRN: [id number]  : 1975    Date of Admission: 2022  Date of Discharge: 22    Admitting Physician: Magdiel Walker MD  Primary Care Provider: System, Provider Not In  Consultations:   Duran Willis MD Neurosurgery   Given, Spencer GARCIA MD  NeuroIntervention    Admission Diagnosis: SAH     Discharge Diagnoses:     SAH     Iron deficiency anemia due to chronic blood loss    Essential hypertension    Smoker    Class 1 obesity in adult    Illicit drug use (UDS + meth/amphetamines, opiates, and oxycodone)     Hyperglycemia    Procedures:  22: Cerebral Angiogram   22: PICC Line Insertion   22: Cerebral angiogram with flow-diverting embolization      Imaging:  CT Head Without Contrast    Result Date: 2022  1. No acute intracranial process. 2. Right mastoid and middle ear effusion. Trace left mastoid effusion. 3. Flow diverting stent distal basilar artery.  Electronically signed by:  Will Bedolla M.D.  2022 2:27 AM Mountain Time    CT Head Without Contrast    Result Date: 2022  Noncontrast CT head demonstrates expected evolutionary change of the previously noted prepontine and suprasellar cistern subarachnoid hemorrhage. There is no evidence of new hemorrhage, hydrocephalus or other evidence of worsening edema.  Not definitely seen on prior outside comparison, there is an arterially enhancing small 2 x 2 millimeter outpouching questionably arising from the basilar tip, concerning for small aneurysm. This finding is somewhat difficult to demonstrate as contiguous with the adjacent basilar artery and may also represent a small incidentally opacified venous structure  Somewhat irregular beaded narrowing is present involving the left posterior cerebral artery, concerning for some component of spasm, without definite occlusion or high-grade stenosis present.  Finding of possible small aneurysm  communicated to Dr. Willis by Domingo Gardner via phone at 6:33 PM 7/30/2022  This report was finalized on 7/30/2022 6:39 PM by Domingo Gardner.      CT Head Without Contrast    Result Date: 7/28/2022   1. Subarachnoid hemorrhage, primarily in the suprasellar cistern and in the prepontine cisterns. Decrease in subarachnoid hemorrhage compared with the outside films of 7/25/1992. 2. No evidence of hydrocephalus or ventricular dilatation.  This report was finalized on 7/28/2022 11:07 AM by Vince Cerda MD.      XR Chest 1 View    Result Date: 7/30/2022  The right-sided PICC line terminates within the right atrium, approximately 6 cm above the cavoatrial junction.  This report was finalized on 7/30/2022 4:23 PM by Domingo Gardner.      CT Angiogram Head    Result Date: 8/2/2022  Postprocedural changes noted from interval flow diverting device exclusion/embolization of the recently noted small basilar tip aneurysm. There is no evidence of persistent aneurysm filling. There is some persistent moderate narrowing of the left P1 and P2 segments, similar to comparison, concerning for some component of spasm. The right PCA distal to the stent demonstrates some mild-to-moderate narrowing as well, without evidence of in-stent occlusion, with distal branches otherwise patent.  There is otherwise no new high-grade stenosis or aneurysmal dilatation elsewhere.  This report was finalized on 8/2/2022 8:31 AM by Domingo Gardner.      CT Angiogram Head    Result Date: 7/30/2022  Noncontrast CT head demonstrates expected evolutionary change of the previously noted prepontine and suprasellar cistern subarachnoid hemorrhage. There is no evidence of new hemorrhage, hydrocephalus or other evidence of worsening edema.  Not definitely seen on prior outside comparison, there is an arterially enhancing small 2 x 2 millimeter outpouching questionably arising from the basilar tip, concerning for small aneurysm. This finding is somewhat  difficult to demonstrate as contiguous with the adjacent basilar artery and may also represent a small incidentally opacified venous structure  Somewhat irregular beaded narrowing is present involving the left posterior cerebral artery, concerning for some component of spasm, without definite occlusion or high-grade stenosis present.  Finding of possible small aneurysm communicated to Dr. Willis by Domingo Gardner via phone at 6:33 PM 7/30/2022  This report was finalized on 7/30/2022 6:39 PM by Domingo Gardner.      History of Present Illness (copied from H&P note on 7/25/22):  46 y.o. female smoker of 1/2 pack/day for 30 years with history of anemia, hysterectomy apparently for bleeding, gout, cholecystectomy, not on any home medications, who developed severe headache the evening of 7/24/2022.  She apparently called her  who ultimately took her to [facility] at about 1 AM.  She reportedly underwent a CT scan of the head/CT angiogram which showed a large subarachnoid hemorrhage with intraventricular involvement but no evidence of aneurysm.  Neurosurgery was called here and apparently accepted the patient in transfer.     Patient's family reports she was minimally responsive overnight but is currently more responsive.  The patient is awake and alert and able to relay a history.  She reports ongoing 10 out of 10 headache that is worse when she moves (end of copied text).    Hospital Course:  Patient was admitted to ICU 2* issues in the above HPI and continued on hemorrhagic CVA protocol with frequent neurologic monitoring & strict BP control. UDS was obtained and was positive for amphetamines, methamphetamines, and oxycodone. She was additionally placed on cerebral vasospasm prophylaxis with IVF and Nimotop, and daily TCDs were performed. Prophylactic Keppra was added as well. Dr. Hauser was consulted and the following day she was taken for cerebral angiogram which was unremarkable with  "no AVM or aneurysm identified.     Hospital course was complicated by mild vasospasm (noted on TCDs) requiring hypertonic saline to keep sodium level 145-155 and ongoing headache ultimately requiring return to cath lab per Dr. 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 She additionally has experienced issues with pain control and anxiety management ultimately controlled with medications, and leukocytosis with urinary symptoms ultimately found to have Proteus UTI placed on course of Rocephin.     Patient has continued to improve / TCDs have showed improving velocities overall / she is nearing the end of her vasospasm window. She is at her neurologic baseline aside from expected mild headache. She is felt to have reached maximum benefit from this hospitalization and has been deemed appropriate for discharge home. She is feeling well, is tolerating a PO diet, and is ambulating without difficulty. Per Neurosurgery recs she will continue ASA, Brilinta, Nimotop, salt tablets, fludrocortisone, and Keppra, and will follow up with Dr. Willis in 2 weeks. She will additionally continue new scripts including Lisinopril and Buspar. She will follow up with her PCP in 1 week with a BMP check to monitor her sodium level. She has been instructed on the importance of abstaining from illicit substances.       Vitals:  /72   Pulse 58   Temp 97.6 °F (36.4 °C) (Oral)   Resp 16   Ht 170 cm (66.93\")   Wt 93.3 kg (205 lb 11 oz)   LMP 05/08/2018   SpO2 96%   BMI 32.28 kg/m²     Physical Exam:  Constitutional:  Appears well-developed and well-nourished. No distress.   HEENT:  Normocephalic and atraumatic. PER  Neck: Normal range of motion. Neck supple. No JVD present.   Cardiovascular: Normal rate, regular rhythm, normal heart sounds and intact distal pulses.  No gallop and no friction rub.  No murmur heard.  Pulmonary/Chest: Effort normal and breath sounds " normal. No respiratory distress. No wheezes, rhonchi or rales.   Abdominal: Soft. No distension and no mass. There is no tenderness.   Musculoskeletal: Normal range of motion.   Neurological: Alert and oriented to person, place, and time.  No focal deficits  Skin: Skin is warm and dry. No rash noted.   Extremities:  No clubbing, edema or cyanosis  Psychiatric: Normal mood and affect. Behavior is normal.     Interval:  (Handoff)  1a. Level of Consciousness: 0-->Alert, keenly responsive  1b. LOC Questions: 0-->Answers both questions correctly  1c. LOC Commands: 0-->Performs both tasks correctly  2. Best Gaze: 0-->Normal  3. Visual: 0-->No visual loss  4. Facial Palsy: 0-->Normal symmetrical movements  5a. Motor Arm, Left: 0-->No drift, limb holds 90 (or 45) degrees for full 10 secs  5b. Motor Arm, Right: 0-->No drift, limb holds 90 (or 45) degrees for full 10 secs  6a. Motor Leg, Left: 0-->No drift, leg holds 30 degree position for full 5 secs  6b. Motor Leg, Right: 0-->No drift, leg holds 30 degree position for full 5 secs  7. Limb Ataxia: 0-->Absent  8. Sensory: 0-->Normal, no sensory loss  9. Best Language: 0-->No aphasia, normal  10. Dysarthria: 0-->Normal  11. Extinction and Inattention (formerly Neglect): 0-->No abnormality    Total (NIH Stroke Scale): 0    Labs:  Results from last 7 days   Lab Units 08/10/22  0528   WBC 10*3/mm3 9.39   HEMOGLOBIN g/dL 8.5*   HEMATOCRIT % 29.3*   PLATELETS 10*3/mm3 365     Results from last 7 days   Lab Units 08/11/22  1622 08/10/22  1523 08/10/22  0528   SODIUM mmol/L 144  144   < > 141   POTASSIUM mmol/L 4.2   < > 3.5   CHLORIDE mmol/L 107   < > 105   CO2 mmol/L 25.0   < > 26.0   BUN mg/dL 14   < > 12   CREATININE mg/dL 0.45*   < > 0.50*   CALCIUM mg/dL 8.6   < > 8.4*   BILIRUBIN mg/dL  --   --  <0.2   ALK PHOS U/L  --   --  70   ALT (SGPT) U/L  --   --  19   AST (SGOT) U/L  --   --  16   GLUCOSE mg/dL 98   < > 151*    < > = values in this interval not displayed.          Magnesium   Date Value Ref Range Status   08/11/2022 1.9 1.6 - 2.6 mg/dL Final   08/10/2022 1.7 1.6 - 2.6 mg/dL Final     Phosphorus   Date Value Ref Range Status   08/11/2022 4.2 2.5 - 4.5 mg/dL Final   08/10/2022 3.5 2.5 - 4.5 mg/dL Final           Discharge Medications:     Discharge Medications      New Medications      Instructions Start Date   aspirin 81 MG chewable tablet   81 mg, Oral, Daily      busPIRone 10 MG tablet  Commonly known as: BUSPAR   10 mg, Oral, 3 Times Daily PRN      fludrocortisone 0.1 MG tablet   0.1 mg, Oral, Every 12 Hours Scheduled      levETIRAcetam 500 MG tablet  Commonly known as: KEPPRA   500 mg, Oral, 2 Times Daily      lisinopril 10 MG tablet  Commonly known as: PRINIVIL,ZESTRIL   10 mg, Oral, Every 24 Hours Scheduled      nicotine 21 MG/24HR patch  Commonly known as: NICODERM CQ   1 patch, Transdermal, Every 24 Hours Scheduled      niMODipine 30 MG capsule  Commonly known as: NIMOTOP   60 mg, Oral, Every 4 Hours Scheduled      sodium chloride 1 g tablet   2 g, Oral, 3 Times Daily With Meals      ticagrelor 90 MG tablet tablet  Commonly known as: BRILINTA   90 mg, Oral, 2 Times Daily         Stop These Medications    desvenlafaxine 100 MG 24 hr tablet  Commonly known as: PRISTIQ          Discharge Diet:   Diet Instructions     Diet: Regular; Thin      Discharge Diet: Regular    Fluid Consistency: Thin        Activity at Discharge:    Activity Instructions     Activity as Tolerated          Follow-up Appointments  No future appointments.  Additional Instructions for the Follow-ups that You Need to Schedule     Discharge Follow-up with PCP   As directed       Currently Documented PCP:    System, Provider Not In    PCP Phone Number:    None     Follow Up Details: 1 week with [facility] check         Discharge Follow-up with Specified Provider: Dr. Willis; 2 Weeks   As directed      To: Dr. Willis    Follow Up: 2 Weeks             Discharge Instructions:  D/C home  F/U as above  Scripts  sent electronically     Current Code Status     Date Active Code Status Order ID Comments User Context       Prior    Advance Care Planning Activity        Keiko Dixon, ISERRA, APRN, AGACNP-BC  Pulmonary and Critical Care Medicine    Time: Discharge 40 min    CC: System, Provider Not In    *Please note that portions of this note were completed with a voice recognition program. Efforts were made to edit the dictations, but occasionally words are mistranscribed.

## 2022-08-12 NOTE — NURSING NOTE
At discharge, there was difficulty finding a pharmacy for the patient to fill her Nimodipine medication that was both open and had this medication in stock. This proved to be very difficult given the time of discharge as well as the approaching weekend which hindered pharmacies from being able to order the medication. After calls to many pharmacies and collaborating with the ICU pharmacist, the patients medications were arranged to be picked up at [facility] retail pharmacy [facility] upon discharge. A new AVS was printed reflecting this new pharmacy information and directions to that pharmacy were given to both the patient and her son, who was her ride home at discharge.

## 2022-08-12 NOTE — OUTREACH NOTE
Prep Survey    Flowsheet Row Responses   [facility] facility patient discharged from? [facility]   Is LACE score < 7 ? No   Emergency Room discharge w/ pulse ox? No   Eligibility Readm Mgmt   Discharge diagnosis Nontraumatic subarachnoid hemorrhage   Does the patient have one of the following disease processes/diagnoses(primary or secondary)? Other   Does the patient have Home health ordered? No   Is there a DME ordered? No   Prep survey completed? Yes          NYASIA DAS - Registered Nurse

## 2022-08-16 ENCOUNTER — READMISSION MANAGEMENT (OUTPATIENT)
Dept: CALL CENTER | Facility: HOSPITAL | Age: 47
End: 2022-08-16

## 2022-08-16 NOTE — OUTREACH NOTE
Medical Week 1 Survey    Flowsheet Row Responses   [facility] patient discharged from? [facility]   Does the patient have one of the following disease processes/diagnoses(primary or secondary)? Other   Week 1 attempt successful? Yes   Call start time 1404   Call end time 1410   Discharge diagnosis subarachnoid hemorrhage   Is patient permission given to speak with other caregiver? Yes   List who call center can speak with Kaur Sherman Daughter    Person spoke with today (if not patient) and relationship Kaur Sherman Daughter    Meds reviewed with patient/caregiver? Yes   Does the patient have all medications ordered at discharge? Yes   Is the patient taking all medications as directed (includes completed medication regime)? Yes   Comments regarding appointments Advised to call and schedule 2 week appt with Dr Willis. Office number given.    Does the patient have a primary care provider?  Yes   Does the patient have an appointment with their PCP within 7 days of discharge? No   Comments regarding PCP Daughter reports patient does have local general provider that she sees.    Nursing Interventions Educated patient on importance of making appointment, Advised patient to make appointment   Has the patient kept scheduled appointments due by today? N/A   Has home health visited the patient within 72 hours of discharge? N/A   Psychosocial issues? No   Did the patient receive a copy of their discharge instructions? Yes   Nursing interventions Reviewed instructions with patient  [daughter]   What is the patient's perception of their health status since discharge? Improving   Is the patient/caregiver able to teach back signs and symptoms related to disease process for when to call PCP? Yes   Is the patient/caregiver able to teach back signs and symptoms related to disease process for when to call 911? Yes   Is the patient/caregiver able to teach back the hierarchy of who to call/visit for symptoms/problems? PCP,  Specialist, Home health nurse, Urgent Care, ED, 911 Yes   If the patient is a current smoker, are they able to teach back resources for cessation? Smoking cessation medications  [has patches for cessation. ]   Week 1 call completed? Yes          ABHAY ARREAGA - Registered Nurse

## 2022-08-22 ENCOUNTER — OFFICE VISIT (OUTPATIENT)
Dept: NEUROSURGERY | Facility: CLINIC | Age: 47
End: 2022-08-22

## 2022-08-22 VITALS
BODY MASS INDEX: 32.88 KG/M2 | SYSTOLIC BLOOD PRESSURE: 159 MMHG | DIASTOLIC BLOOD PRESSURE: 52 MMHG | HEIGHT: 66 IN | HEART RATE: 80 BPM | WEIGHT: 204.6 LBS | RESPIRATION RATE: 16 BRPM

## 2022-08-22 DIAGNOSIS — I60.9 SAH (SUBARACHNOID HEMORRHAGE): ICD-10-CM

## 2022-08-22 DIAGNOSIS — I60.9 SAH (SUBARACHNOID HEMORRHAGE): Primary | ICD-10-CM

## 2022-08-22 PROCEDURE — 99213 OFFICE O/P EST LOW 20 MIN: CPT | Performed by: NEUROLOGICAL SURGERY

## 2022-08-22 RX ORDER — LISINOPRIL 20 MG/1
20 TABLET ORAL DAILY
COMMUNITY
Start: 2022-08-18

## 2022-08-22 RX ORDER — HYDROXYZINE PAMOATE 25 MG/1
25 CAPSULE ORAL NIGHTLY PRN
COMMUNITY
Start: 2022-08-18

## 2022-08-22 NOTE — PROGRESS NOTES
"Subjective     Chief Complaint: Subarachnoid hemorrhage    Patient ID: Mary Celis is a 47 y.o. female is here today for follow-up.    History of Present Illness    This is a 47-year-old woman in whom I performed a pipeline embolization of a ruptured basilar aneurysm about 2 weeks ago.  She presents today for routine follow-up.  She is still endorsing problems with daily headaches.  She occasionally has word finding difficulties and has had outbursts of anger and sadness.  She is still taking aspirin and Plavix.    The following portions of the patient's history were reviewed and updated as appropriate: allergies, current medications, past family history, past medical history, past social history, past surgical history and problem list.    Family history:   Family History   Problem Relation Age of Onset   • Diabetes Mother    • Diabetes Father    • Diabetes Maternal Grandmother    • Heart disease Maternal Grandmother    • Heart disease Paternal Grandmother    • Colon cancer Maternal Grandfather        Social history:   Social History     Socioeconomic History   • Marital status:    Tobacco Use   • Smoking status: Current Every Day Smoker     Packs/day: 1.00     Years: 25.00     Pack years: 25.00     Types: Cigarettes   • Smokeless tobacco: Never Used   Substance and Sexual Activity   • Alcohol use: Yes     Comment: SOCIAL USE, NO ABUSE REPORTED   • Drug use: Yes     Types: Marijuana   • Sexual activity: Yes     Partners: Male     Birth control/protection: None       Review of Systems    Objective   Blood pressure 159/52, pulse 80, resp. rate 16, height 167.6 cm (66\"), weight 92.8 kg (204 lb 9.6 oz), last menstrual period 05/08/2018, not currently breastfeeding.  Body mass index is 33.02 kg/m².    Physical Exam  Constitutional:       General: She is not in acute distress.     Appearance: She is well-developed. She is not diaphoretic.   HENT:      Head: Normocephalic and atraumatic.   Pulmonary:      Effort: " Pulmonary effort is normal.   Skin:     General: Skin is warm and dry.   Neurological:      Mental Status: She is alert and oriented to person, place, and time.      Cranial Nerves: No cranial nerve deficit.         Assessment & Plan     Independent Review of Radiographic Studies:      She has no new imaging for me to review    Medical Decision Making:      Informed consent for a diagnostic cerebral angiogram was obtained from the patient.  We will schedule this in the next week or 2.  She should remain on her aspirin and Plavix for the time being.    Diagnoses and all orders for this visit:    1. SAH (subarachnoid hemorrhage) (HCC) (Primary)  -     Case Request Cath Lab: Cerebral angiogram; Standing  -     Case Request Cath Lab: Cerebral angiogram    2. SAH   -     Case Request Cath Lab: Cerebral angiogram; Standing  -     Case Request Cath Lab: Cerebral angiogram        No follow-ups on file.           This document signed by BRIAN Willis MD August 22, 2022 12:56 EDT

## 2022-08-22 NOTE — TELEPHONE ENCOUNTER
Pt calling in regard to medication refill. She will be seeing Dr. Willis today. Encounter closed.

## 2022-08-24 ENCOUNTER — READMISSION MANAGEMENT (OUTPATIENT)
Dept: CALL CENTER | Facility: HOSPITAL | Age: 47
End: 2022-08-24

## 2022-08-24 NOTE — OUTREACH NOTE
Medical Week 2 Survey    Flowsheet Row Responses   [facility] patient discharged from? [facility]   Does the patient have one of the following disease processes/diagnoses(primary or secondary)? Other   Week 2 attempt successful? No   Unsuccessful attempts Attempt 1          GENA MENDEZ - Registered Nurse

## 2022-08-25 ENCOUNTER — TELEPHONE (OUTPATIENT)
Dept: NEUROSURGERY | Facility: CLINIC | Age: 47
End: 2022-08-25

## 2022-08-25 NOTE — TELEPHONE ENCOUNTER
"Provider:  Lazaro  Surgery/Procedure:  Cerebral angiogram  Surgery/Procedure Date: 09/01/2022   Last visit:  Office Visit with Duran Willis MD (08/22/2022)   Next visit: Angiogram     Reason for call:     Dominique from patient's PCP office LVM wanting to know what medication patient is suppose to stop or start before her procedure next week.    Per last OV note,    \"Medical Decision Making:       Informed consent for a diagnostic cerebral angiogram was obtained from the patient.  We will schedule this in the next week or 2.  She should remain on her aspirin and Plavix for the time being.\"    I do not see any mention of patient stopping medication in note. Okay to relay the above information to PCP?         "

## 2022-08-25 NOTE — TELEPHONE ENCOUNTER
"Attempted to contact Tessa Polanco's nurse but no answer. LVM relaying Dr. Willis's instructions: \"She should remain on her aspirin and Plavix for the time being.\"    I stated to call our office back with any questions.   "

## 2022-08-26 NOTE — TELEPHONE ENCOUNTER
Mary called asking if she needs to d/c Brilinta 7 days prior to her procedure with Dr. Willis on September 1st.

## 2022-08-26 NOTE — TELEPHONE ENCOUNTER
Please double check with Dr. Willis.  Per the notes in the hospital patient was on Brilinta and aspirin.  And his most recent office note is stated that she is on aspirin and Plavix.  Needless to say she needs to stay on her dual antiplatelet therapy which ever that may be

## 2022-08-30 ENCOUNTER — TELEPHONE (OUTPATIENT)
Dept: NEUROSURGERY | Facility: CLINIC | Age: 47
End: 2022-08-30

## 2022-08-30 DIAGNOSIS — I60.9 SAH (SUBARACHNOID HEMORRHAGE): Primary | ICD-10-CM

## 2022-08-30 NOTE — TELEPHONE ENCOUNTER
Provider: Lazaro  Surgery/Procedure:  Cerebral angiogram  Surgery/Procedure Date:  9-1-22  Last visit:   8-22-22  Next visit: TBD     Reason for call: Patient called and wanted to know what medicine to stop before she has her procedure. She has been having nose bleeds that her PCP knows about. She stopped the Brilinta 7 days ago. IS there any other medicines she should stop. She is still taking her aspirin.  Please Advise. Thank you.

## 2022-08-30 NOTE — TELEPHONE ENCOUNTER
This was routed to Dr. Willis on 8/25/22 as high priority, I will have him review this first thing in the morning to have this addressed.

## 2022-08-30 NOTE — TELEPHONE ENCOUNTER
Last telephone encounter I asked the MAs to double check with Dr. Willis whether she is supposed to be on Brilinta and aspirin.  There is no documentation of this.  In his last office note he said to stay on dual antiplatelet therapy.  Patient should be on dual antiplatelet therapy.  Whether this is aspirin and Plavix or Brilinta and Plavix.    The procedure may need to be canceled.  This needs to be high-priority to confirm with Dr. Willis what he would like for her to do

## 2022-08-31 ENCOUNTER — LAB (OUTPATIENT)
Dept: LAB | Facility: HOSPITAL | Age: 47
End: 2022-08-31

## 2022-08-31 DIAGNOSIS — I60.9 SAH (SUBARACHNOID HEMORRHAGE): ICD-10-CM

## 2022-08-31 LAB — PA ADP PRP-ACNC: 262 PRU

## 2022-08-31 PROCEDURE — 36415 COLL VENOUS BLD VENIPUNCTURE: CPT

## 2022-08-31 PROCEDURE — 85576 BLOOD PLATELET AGGREGATION: CPT

## 2022-08-31 RX ORDER — CLOPIDOGREL BISULFATE 75 MG/1
75 TABLET ORAL DAILY
Qty: 30 TABLET | Refills: 0 | Status: SHIPPED | OUTPATIENT
Start: 2022-08-31 | End: 2022-10-17 | Stop reason: SDUPTHER

## 2022-08-31 NOTE — TELEPHONE ENCOUNTER
I spoke with Dr. Willis.   Pt should be taking ASA & Plavix only. He would like to obtain an P2y12 prior to her angiogram tomorrow.     I spoke with pt, she has NOT been taking Plavix, she states this was never prescribed. She d/c the Brilinta 7 days ago due to nose bleeds, and has continued taking ASA.     Pt will p/u Plavix RX today at the hospital and will also get her lab completed per Dr. Delgadillo request.

## 2022-09-01 ENCOUNTER — HOSPITAL ENCOUNTER (OUTPATIENT)
Facility: HOSPITAL | Age: 47
Setting detail: HOSPITAL OUTPATIENT SURGERY
Discharge: HOME | End: 2022-09-01
Attending: NEUROLOGICAL SURGERY | Admitting: NEUROLOGICAL SURGERY

## 2022-09-01 VITALS
DIASTOLIC BLOOD PRESSURE: 68 MMHG | SYSTOLIC BLOOD PRESSURE: 119 MMHG | HEART RATE: 71 BPM | BODY MASS INDEX: 33.27 KG/M2 | WEIGHT: 207.01 LBS | HEIGHT: 66 IN | RESPIRATION RATE: 20 BRPM | OXYGEN SATURATION: 90 % | TEMPERATURE: 98.2 F

## 2022-09-01 DIAGNOSIS — I60.9 SAH (SUBARACHNOID HEMORRHAGE): ICD-10-CM

## 2022-09-01 LAB
ANION GAP SERPL CALCULATED.3IONS-SCNC: 12 MMOL/L (ref 5–15)
BUN SERPL-MCNC: 13 MG/DL (ref 6–20)
BUN/CREAT SERPL: 18.8 (ref 7–25)
CALCIUM SPEC-SCNC: 9.1 MG/DL (ref 8.6–10.5)
CHLORIDE SERPL-SCNC: 108 MMOL/L (ref 98–107)
CO2 SERPL-SCNC: 25 MMOL/L (ref 22–29)
CREAT SERPL-MCNC: 0.69 MG/DL (ref 0.57–1)
DEPRECATED RDW RBC AUTO: 44.6 FL (ref 37–54)
EGFRCR SERPLBLD CKD-EPI 2021: 107.9 ML/MIN/1.73
ERYTHROCYTE [DISTWIDTH] IN BLOOD BY AUTOMATED COUNT: 16 % (ref 12.3–15.4)
GLUCOSE SERPL-MCNC: 108 MG/DL (ref 65–99)
HCT VFR BLD AUTO: 30.2 % (ref 34–46.6)
HGB BLD-MCNC: 8.8 G/DL (ref 12–15.9)
MCH RBC QN AUTO: 22.2 PG (ref 26.6–33)
MCHC RBC AUTO-ENTMCNC: 29.1 G/DL (ref 31.5–35.7)
MCV RBC AUTO: 76.3 FL (ref 79–97)
PLATELET # BLD AUTO: 386 10*3/MM3 (ref 140–450)
PMV BLD AUTO: 11.7 FL (ref 6–12)
POTASSIUM SERPL-SCNC: 3.5 MMOL/L (ref 3.5–5.2)
RBC # BLD AUTO: 3.96 10*6/MM3 (ref 3.77–5.28)
SODIUM SERPL-SCNC: 145 MMOL/L (ref 136–145)
WBC NRBC COR # BLD: 8.57 10*3/MM3 (ref 3.4–10.8)

## 2022-09-01 PROCEDURE — C1769 GUIDE WIRE: HCPCS | Performed by: NEUROLOGICAL SURGERY

## 2022-09-01 PROCEDURE — 36226 PLACE CATH VERTEBRAL ART: CPT | Performed by: NEUROLOGICAL SURGERY

## 2022-09-01 PROCEDURE — 99153 MOD SED SAME PHYS/QHP EA: CPT | Performed by: NEUROLOGICAL SURGERY

## 2022-09-01 PROCEDURE — C1894 INTRO/SHEATH, NON-LASER: HCPCS | Performed by: NEUROLOGICAL SURGERY

## 2022-09-01 PROCEDURE — 36224 PLACE CATH CAROTD ART: CPT | Performed by: NEUROLOGICAL SURGERY

## 2022-09-01 PROCEDURE — 25010000002 HYDRALAZINE PER 20 MG: Performed by: NEUROLOGICAL SURGERY

## 2022-09-01 PROCEDURE — 25010000002 MIDAZOLAM PER 1 MG: Performed by: NEUROLOGICAL SURGERY

## 2022-09-01 PROCEDURE — 0 IODIXANOL PER 1 ML: Performed by: NEUROLOGICAL SURGERY

## 2022-09-01 PROCEDURE — 76937 US GUIDE VASCULAR ACCESS: CPT | Performed by: NEUROLOGICAL SURGERY

## 2022-09-01 PROCEDURE — 85027 COMPLETE CBC AUTOMATED: CPT | Performed by: NEUROLOGICAL SURGERY

## 2022-09-01 PROCEDURE — C1760 CLOSURE DEV, VASC: HCPCS | Performed by: NEUROLOGICAL SURGERY

## 2022-09-01 PROCEDURE — 25010000002 FENTANYL CITRATE (PF) 50 MCG/ML SOLUTION: Performed by: NEUROLOGICAL SURGERY

## 2022-09-01 PROCEDURE — 99152 MOD SED SAME PHYS/QHP 5/>YRS: CPT | Performed by: NEUROLOGICAL SURGERY

## 2022-09-01 PROCEDURE — 80048 BASIC METABOLIC PNL TOTAL CA: CPT | Performed by: NEUROLOGICAL SURGERY

## 2022-09-01 PROCEDURE — 36415 COLL VENOUS BLD VENIPUNCTURE: CPT

## 2022-09-01 RX ORDER — SODIUM CHLORIDE 0.9 % (FLUSH) 0.9 %
10 SYRINGE (ML) INJECTION EVERY 12 HOURS SCHEDULED
Status: DISCONTINUED | OUTPATIENT
Start: 2022-09-01 | End: 2022-09-01 | Stop reason: HOSPADM

## 2022-09-01 RX ORDER — SODIUM CHLORIDE 0.9 % (FLUSH) 0.9 %
10 SYRINGE (ML) INJECTION AS NEEDED
Status: DISCONTINUED | OUTPATIENT
Start: 2022-09-01 | End: 2022-09-01 | Stop reason: HOSPADM

## 2022-09-01 RX ORDER — SODIUM CHLORIDE 9 MG/ML
75 INJECTION, SOLUTION INTRAVENOUS CONTINUOUS
Status: DISCONTINUED | OUTPATIENT
Start: 2022-09-01 | End: 2022-09-01 | Stop reason: HOSPADM

## 2022-09-01 RX ORDER — HYDRALAZINE HYDROCHLORIDE 20 MG/ML
10 INJECTION INTRAMUSCULAR; INTRAVENOUS ONCE
Status: COMPLETED | OUTPATIENT
Start: 2022-09-01 | End: 2022-09-01

## 2022-09-01 RX ORDER — ACETAMINOPHEN 325 MG/1
650 TABLET ORAL EVERY 6 HOURS PRN
Status: DISCONTINUED | OUTPATIENT
Start: 2022-09-01 | End: 2022-09-01 | Stop reason: HOSPADM

## 2022-09-01 RX ORDER — LIDOCAINE HYDROCHLORIDE 10 MG/ML
INJECTION, SOLUTION EPIDURAL; INFILTRATION; INTRACAUDAL; PERINEURAL AS NEEDED
Status: DISCONTINUED | OUTPATIENT
Start: 2022-09-01 | End: 2022-09-01 | Stop reason: HOSPADM

## 2022-09-01 RX ORDER — IODIXANOL 320 MG/ML
INJECTION, SOLUTION INTRAVASCULAR AS NEEDED
Status: DISCONTINUED | OUTPATIENT
Start: 2022-09-01 | End: 2022-09-01 | Stop reason: HOSPADM

## 2022-09-01 RX ORDER — MIDAZOLAM HYDROCHLORIDE 1 MG/ML
INJECTION INTRAMUSCULAR; INTRAVENOUS AS NEEDED
Status: DISCONTINUED | OUTPATIENT
Start: 2022-09-01 | End: 2022-09-01 | Stop reason: HOSPADM

## 2022-09-01 RX ORDER — FENTANYL CITRATE 50 UG/ML
INJECTION, SOLUTION INTRAMUSCULAR; INTRAVENOUS AS NEEDED
Status: DISCONTINUED | OUTPATIENT
Start: 2022-09-01 | End: 2022-09-01 | Stop reason: HOSPADM

## 2022-09-01 RX ADMIN — ACETAMINOPHEN 650 MG: 325 TABLET, FILM COATED ORAL at 10:11

## 2022-09-01 RX ADMIN — HYDRALAZINE HYDROCHLORIDE 10 MG: 20 INJECTION INTRAMUSCULAR; INTRAVENOUS at 10:11

## 2022-09-02 ENCOUNTER — TELEPHONE (OUTPATIENT)
Dept: NEUROSURGERY | Facility: CLINIC | Age: 47
End: 2022-09-02

## 2022-09-02 NOTE — TELEPHONE ENCOUNTER
Provider: Lazaro   Surgery/Procedure: Invasive peripheral vascular study (09/01/2022 09:17)     Last visit:  Office Visit with Duran Willis MD (08/22/2022)     Next visit: Appointment with Duran Willis MD (10/03/2022)       Reason for call: Pt LVM requesting the results of her platelet count. Informed pt of her result only and explained that I could not explain any of the results with her. I also informed her that Justine placed an order for a repeat P2Y12 to be done. Pt verbalized understanding and was thankful for the return call.

## 2022-09-06 ENCOUNTER — TELEPHONE (OUTPATIENT)
Dept: NEUROSURGERY | Facility: CLINIC | Age: 47
End: 2022-09-06

## 2022-09-06 NOTE — TELEPHONE ENCOUNTER
Patient was told by Dr. Willis she could go back to work.  She will need a note stating that faxed to her employer Att:  Frances [phone].  Clinical will need to make that determination and produce letter.  Kary

## 2022-09-06 NOTE — TELEPHONE ENCOUNTER
PT CALLED AND STATES HER EMPLOYERS FAX MACHINE IS DOWN AND WOULD LIKE THE LETTER EMAILED TO THEM AT    [email or web address]    PLEASE CALL PT WITH QUESTIONS @ [phone]

## 2022-10-03 ENCOUNTER — TELEPHONE (OUTPATIENT)
Dept: NEUROSURGERY | Facility: CLINIC | Age: 47
End: 2022-10-03

## 2022-10-03 NOTE — TELEPHONE ENCOUNTER
Called patient to get her set up for Dr. Willis's televisit at 11:30am, could not leave a message due to her voicemail being full.     Will try again 2 more times to reach patient.     1st attempt 11:06am  2nd attempt 11:22am  3rd 11:41    Dr. Willis is going in to surgery, but does want to speak to her whenever she can reschedule

## 2022-10-17 ENCOUNTER — OFFICE VISIT (OUTPATIENT)
Dept: NEUROSURGERY | Facility: CLINIC | Age: 47
End: 2022-10-17

## 2022-10-17 VITALS — BODY MASS INDEX: 32.14 KG/M2 | WEIGHT: 200 LBS | RESPIRATION RATE: 18 BRPM | HEIGHT: 66 IN

## 2022-10-17 DIAGNOSIS — I60.9 SAH (SUBARACHNOID HEMORRHAGE): ICD-10-CM

## 2022-10-17 DIAGNOSIS — R41.3 MEMORY CHANGES: Primary | ICD-10-CM

## 2022-10-17 PROCEDURE — 99441 PR PHYS/QHP TELEPHONE EVALUATION 5-10 MIN: CPT | Performed by: NEUROLOGICAL SURGERY

## 2022-10-17 RX ORDER — BREXPIPRAZOLE 1 MG/1
TABLET ORAL
COMMUNITY
Start: 2022-09-30

## 2022-10-17 RX ORDER — CLOPIDOGREL BISULFATE 75 MG/1
75 TABLET ORAL DAILY
Qty: 30 TABLET | Refills: 1 | Status: SHIPPED | OUTPATIENT
Start: 2022-10-17 | End: 2022-12-12

## 2022-10-17 RX ORDER — ASPIRIN 81 MG/1
81 TABLET ORAL DAILY
Qty: 30 TABLET | Refills: 12 | Status: SHIPPED | OUTPATIENT
Start: 2022-10-17

## 2022-10-17 RX ORDER — ATOMOXETINE 25 MG/1
CAPSULE ORAL
COMMUNITY
Start: 2022-10-13

## 2022-10-17 NOTE — PROGRESS NOTES
"Subjective     Chief Complaint: Ruptured AVM follow-up    Patient ID: Mary Celis is a 47 y.o. female is here today for follow-up.    History of Present Illness    This is a 47-year-old woman in whom I performed a pipeline embolization of a ruptured AVM several months ago.  She recently underwent a follow-up angiogram which confirmed that the aneurysm/AVM had been excluded from the intracranial circulation.  Telephone follow-up was established today.    She is reporting some problems with some short-term memory loss but other than that she denies any headaches, nausea, vomiting, strokelike symptoms, or seizures.  She stopped taking her aspirin and Plavix about 9 days ago because she states that her prescriptions ran out.  She did not contact my office and ask for any refills.    The following portions of the patient's history were reviewed and updated as appropriate: allergies, current medications, past family history, past medical history, past social history, past surgical history and problem list.    Family history:   Family History   Problem Relation Age of Onset   • Diabetes Mother    • Diabetes Father    • Diabetes Maternal Grandmother    • Heart disease Maternal Grandmother    • Heart disease Paternal Grandmother    • Colon cancer Maternal Grandfather        Social history:   Social History     Socioeconomic History   • Marital status:    Tobacco Use   • Smoking status: Every Day     Packs/day: 1.00     Years: 25.00     Pack years: 25.00     Types: Cigarettes   • Smokeless tobacco: Never   • Tobacco comments:     smoked 2 cigarrettes a day, trying to quit per pt report.   Substance and Sexual Activity   • Alcohol use: Yes     Comment: occ, once every 6 months   • Drug use: Not Currently     Types: Marijuana   • Sexual activity: Defer       Review of Systems    Objective   Resp. rate 18, height 167.6 cm (66\"), weight 90.7 kg (200 lb), last menstrual period 05/08/2018, not currently breastfeeding.  Body " mass index is 32.28 kg/m².    Physical Exam    Assessment & Plan     Independent Review of Radiographic Studies:      She has no imaging for me to review          Medical Decision Making:      You have chosen to receive care through a telephone visit. Do you consent to use a telephone visit for your medical care today? Yes    I reviewed the signs and symptoms of subarachnoid hemorrhage and stroke with her.  I directed her to call 911 if she think she is having a stroke and/or TIA.    She should stay on aspirin and Plavix for 6 months.  After 6 months she can stop her Plavix but she should stay on aspirin for life.  She will need a surveillance angiogram in 1 year.  We will schedule that in 2023.    Approximate 9 minutes was spent on today's phone call.    Diagnoses and all orders for this visit:    1. Memory changes (Primary)  -     Ambulatory Referral to Neurology    2. SAH (subarachnoid hemorrhage) (HCC)  -     CT Angiogram Head With Contrast; Future    Other orders  -     clopidogrel (Plavix) 75 MG tablet; Take 1 tablet by mouth Daily.  Dispense: 30 tablet; Refill: 1  -     aspirin 81 MG EC tablet; Take 1 tablet by mouth Daily.  Dispense: 30 tablet; Refill: 12        No follow-ups on file.           This document signed by BRIAN Willis MD October 17, 2022 13:43 EDT

## 2022-12-12 RX ORDER — CLOPIDOGREL BISULFATE 75 MG/1
TABLET ORAL
Qty: 60 TABLET | Refills: 0 | Status: SHIPPED | OUTPATIENT
Start: 2022-12-12 | End: 2023-02-14 | Stop reason: SDUPTHER

## 2022-12-12 NOTE — TELEPHONE ENCOUNTER
"Provider:  Lazaro  Caller:  Automated refill request  Surgery:  Cerebral angiogram  Surgery Date:  09/01/2022  Last visit:  Office Visit with Duran Willis MD (10/17/2022)  Next visit: NA  Last filled: 10/17/2022      Reason for call:         Automated refill request for Plavix. Medication pending.     Per last OV note 10/17/2022:    \"Medical Decision Making:       You have chosen to receive care through a telephone visit. Do you consent to use a telephone visit for your medical care today? Yes     I reviewed the signs and symptoms of subarachnoid hemorrhage and stroke with her.  I directed her to call 911 if she think she is having a stroke and/or TIA.     She should stay on aspirin and Plavix for 6 months.  After 6 months she can stop her Plavix but she should stay on aspirin for life.  She will need a surveillance angiogram in 1 year.  We will schedule that in 2023.\"    Requested Prescriptions     Pending Prescriptions Disp Refills   • clopidogrel (PLAVIX) 75 MG tablet [Pharmacy Med Name: CLOPIDOGREL 75 MG TABLET] 60 tablet      Sig: TAKE ONE TABLET BY MOUTH DAILY       "

## 2023-02-14 RX ORDER — CLOPIDOGREL BISULFATE 75 MG/1
75 TABLET ORAL DAILY
Qty: 30 TABLET | Refills: 3 | Status: SHIPPED | OUTPATIENT
Start: 2023-02-14

## 2023-02-14 NOTE — TELEPHONE ENCOUNTER
Provider:  Lazaro  Surgery/Procedure:  Pipeline embol for ruptured AVM  Surgery/Procedure Date:  7/31/2022  Last visit:   10/17/2022  Next visit: NA     Reason for call:  Pt calling requesting RF for Plavix.

## 2023-02-14 NOTE — TELEPHONE ENCOUNTER
Medication sent to pt's pharmacy.   Called pt to advise, no answer, VM is full.       HUB OK TO RELAY MESSAGE

## 2023-03-15 ENCOUNTER — TELEPHONE (OUTPATIENT)
Dept: NEUROLOGY | Facility: CLINIC | Age: 48
End: 2023-03-15

## 2023-03-15 NOTE — TELEPHONE ENCOUNTER
Ok. Noted. She was referred by neurosurgery. She can always call and follow up in future. Thanks, SELVIN Colon

## 2025-03-15 NOTE — CASE MANAGEMENT/SOCIAL WORK
Continued Stay Note  [facility]     Patient Name: Mary Celis  MRN: [id number]  Today's Date: 8/2/2022    Admit Date: 7/25/2022     Discharge Plan     Row Name 08/02/22 1254       Plan    Plan Social work spoke with the patients daughter Kaur and explained the Medicaid process and explained to her about the phone number that can be called to assist with applying for Medicaid and she will try to call the phone number. She said that the website was confusing.               Discharge Codes    No documentation.               Expected Discharge Date and Time     Expected Discharge Date Expected Discharge Time    Aug 5, 2022             XIOMARA Enciso    
Continued Stay Note  [facility]     Patient Name: Mary Celis  MRN: [id number]  Today's Date: 8/5/2022    Admit Date: 7/25/2022     Discharge Plan     Row Name 08/05/22 1601       Plan    Plan Home    Plan Comments   Ms. Celis remains on 3% hypertonic NS however, it was decreased today by Dr. Barrientos. I anticipate she will remain inpatient over the weekend. Her discharge plan remains home. Case management will continue to follow Ms. Celis's progress and provide for her any anticipated discharge needs.           Expected Discharge Date and Time     Expected Discharge Date Expected Discharge Time    Aug 11, 2022         Sona Ramirez RN    
Continued Stay Note  [facility]     Patient Name: Mary Celis  MRN: [id number]  Today's Date: 8/8/2022    Admit Date: 7/25/2022     Discharge Plan     Row Name 08/08/22 1111       Plan    Plan Home    Patient/Family in Agreement with Plan   yes    Plan Comments   Ms. Celis has been ordered out of ICU today. She is currently on 1.5% Normal Saline. I anticipate she will be medically ready for discharge in less than 1 week. Her discharge plan remains home. Case management will continue to follow Ms. Celis's progress and provide for her any anticipated discharge needs.        Final Discharge Disposition Code 01 - home or self-care           Expected Discharge Date and Time     Expected Discharge Date Expected Discharge Time    Aug 16, 2022         Sona Ramirez RN    
Continued Stay Note  [facility]     Patient Name: Mary Celis  MRN: [id number]  Today's Date: 8/1/2022    Admit Date: 7/25/2022     Discharge Plan     Row Name 08/01/22 1438       Plan    Plan Home    Patient/Family in Agreement with Plan   yes    Plan Comments   I met with  and her daughter today at the bedside to discuss discharge planning. She is to have a transcranial duplex today and is on 3% NS. Case management will continue to follow her progress. Plan is home.        Final Discharge Disposition Code 01 - home or self-care           Expected Discharge Date and Time     Expected Discharge Date Expected Discharge Time    Aug 5, 2022       Sona Ramirez RN    
Continued Stay Note  [facility]     Patient Name: Mary Celis  MRN: [id number]  Today's Date: 8/3/2022    Admit Date: 7/25/2022     Discharge Plan     Row Name 08/03/22 1222       Plan    Plan Home    Patient/Family in Agreement with Plan other (see comments)    Plan Comments Pt discussed in MDR today. Per pt's RN/Dori, Dr. Barrientos stated that pt would remain in the hospital at least for another week. Sodium lab today was 140, NIH 0. D/C plan is home. No needs voiced or identified. CM will continue to follow.    Final Discharge Disposition Code 01 - home or self-care               Discharge Codes    No documentation.               Expected Discharge Date and Time     Expected Discharge Date Expected Discharge Time    Aug 5, 2022             Eleanor Vargas RN    
Continued Stay Note  [facility]     Patient Name: Mary Celis  MRN: [id number]  Today's Date: 8/10/2022    Admit Date: 7/25/2022     Discharge Plan     Row Name 08/10/22 1505       Plan    Plan Home    Patient/Family in Agreement with Plan   yes    Plan Comments   Neurosurgery has discontinued her transcranial dopplers and her hypertonic saline infusion. I anticipate she will be medically ready for discharge by Friday. Case management will continue to follow her progress. Home is her discharge plan.        Final Discharge Disposition Code 01 - home or self-care           Expected Discharge Date and Time     Expected Discharge Date Expected Discharge Time    Aug 12, 2022       Sona Ramirez RN    
aerobic capacity/endurance/gait, locomotion, and balance/muscle strength

## (undated) DEVICE — CONMED SCOPE SAVER BITE BLOCK, 20X27 MM: Brand: SCOPE SAVER

## (undated) DEVICE — DISPOSABLE MONOPOLAR ENDOSCOPIC CORD 10 FT. (3M): Brand: KIRWAN

## (undated) DEVICE — SOL NACL 0.9PCT 1000ML

## (undated) DEVICE — SYR LUER SLPTP 50ML

## (undated) DEVICE — CVR PROB ULTRASND/TRANSD W/GEL 7X11IN STRL

## (undated) DEVICE — GLIDESHEATH SLENDER STAINLESS STEEL KIT: Brand: GLIDESHEATH SLENDER

## (undated) DEVICE — Device

## (undated) DEVICE — BLANKT WARM UNDER/BDY FUL/ACC A/ 90X206CM

## (undated) DEVICE — RADIFOCUS GLIDEWIRE: Brand: GLIDEWIRE

## (undated) DEVICE — CUFF SCD HEMOFORCE SEQ CALF STD MD

## (undated) DEVICE — NEURO GUIDEWIRE WITH HYDROPHILIC COATING: Brand: SYNCHRO 14

## (undated) DEVICE — CATH TEMPO 5F BER 100CM: Brand: TEMPO

## (undated) DEVICE — ENDOPATH XCEL UNIVERSAL TROCAR STABLILITY SLEEVES: Brand: ENDOPATH XCEL

## (undated) DEVICE — LONG SHEATH: Brand: AXS INFINITY LS

## (undated) DEVICE — WAND INSULSCAN BX50 STRL

## (undated) DEVICE — DISTAL ACCESS CATHETER: Brand: AXS CATALYST 5

## (undated) DEVICE — MEDI-VAC NON-CONDUCTIVE SUCTION TUBING: Brand: CARDINAL HEALTH

## (undated) DEVICE — TP ELECTRD LAP L WR SPLIT33CM

## (undated) DEVICE — ENDOGATOR AUXILIARY WATER JET CONNECTOR: Brand: ENDOGATOR

## (undated) DEVICE — INTRO SHEATH ART/FEM ENGAGE .038 5F12CM

## (undated) DEVICE — DETACHMENT SYSTEM: Brand: INZONE

## (undated) DEVICE — TP SXN YANKR BULB STRL

## (undated) DEVICE — FRCP BIOP COLD ENDOJAW ALLGTR W/NDL 2.8X2300MM BLU

## (undated) DEVICE — MONOPOLAR METZENBAUM SCISSOR, MINI BLADE TIP, DISPOSABLE: Brand: MONOPOLAR METZENBAUM SCISSOR, MINI BLADE TIP, DISPOSABLE

## (undated) DEVICE — STPCK 3/WY HP M/RA W/OFF/HNDL 1050PSI STRL

## (undated) DEVICE — ST ACC MICROPUNCTURE .018 TRANSLSS/SS/TP 5F/10CM 21G/7CM

## (undated) DEVICE — RADIFOCUS TORQUE DEVICE MULTI-TORQUE VISE: Brand: RADIFOCUS TORQUE DEVICE

## (undated) DEVICE — KT CATH CHOLANGIOGRA PERC W/BALLO

## (undated) DEVICE — UNDYED BRAIDED (POLYGLACTIN 910), SYNTHETIC ABSORBABLE SUTURE: Brand: COATED VICRYL

## (undated) DEVICE — ROTATING HEMOSTATIC VALVE .096": Brand: RHV

## (undated) DEVICE — ENDOPATH XCEL BLADELESS TROCARS WITH STABILITY SLEEVES: Brand: ENDOPATH XCEL

## (undated) DEVICE — 2, DISPOSABLE SUCTION/IRRIGATOR WITHOUT DISPOSABLE TIP: Brand: STRYKEFLOW

## (undated) DEVICE — NDL PERC 1PRT THNWALL W/BASEPLT 18G 7CM

## (undated) DEVICE — LIMB HOLDER, WRIST/ANKLE: Brand: DEROYAL

## (undated) DEVICE — LEX NEURO ANGIOGRAPHY: Brand: MEDLINE INDUSTRIES, INC.

## (undated) DEVICE — ANGIO-SEAL VIP VASCULAR CLOSURE DEVICE: Brand: ANGIO-SEAL

## (undated) DEVICE — CLAVICLE STRAP: Brand: DEROYAL

## (undated) DEVICE — JELLY,LUBE,STERILE,FLIP TOP,TUBE,2-OZ: Brand: MEDLINE

## (undated) DEVICE — TOTAL TRAY, 16FR 10ML SIL FOLEY, URN: Brand: MEDLINE

## (undated) DEVICE — PINNACLE INTRODUCER SHEATH: Brand: PINNACLE

## (undated) DEVICE — GLV SURG SENSICARE W/ALOE PF LF 7.5 STRL

## (undated) DEVICE — 3M™ STERI-STRIP™ REINFORCED ADHESIVE SKIN CLOSURES, R1547, 1/2 IN X 4 IN (12 MM X 100 MM), 6 STRIPS/ENVELOPE: Brand: 3M™ STERI-STRIP™

## (undated) DEVICE — 2 TIP PRE-SHAPED 90 MICROCATHETER: Brand: EXCELSIOR SL-10

## (undated) DEVICE — RICH GENERAL LAPAROSCOPY: Brand: MEDLINE INDUSTRIES, INC.

## (undated) DEVICE — CATH MIC PHENOM 27 .040IN 15CM

## (undated) DEVICE — PAD GRND REM POLYHESIVE A/ DISP

## (undated) DEVICE — BLD CLIP UNIV SURG GRY

## (undated) DEVICE — SUT VIC 0/0 UR6 27IN DYED J603H

## (undated) DEVICE — INTRO SHEATH ENGAGE W/50 GW .038 8F12